# Patient Record
Sex: MALE | HISPANIC OR LATINO | Employment: OTHER | URBAN - METROPOLITAN AREA
[De-identification: names, ages, dates, MRNs, and addresses within clinical notes are randomized per-mention and may not be internally consistent; named-entity substitution may affect disease eponyms.]

---

## 2019-05-13 ENCOUNTER — HOSPITAL ENCOUNTER (INPATIENT)
Facility: HOSPITAL | Age: 79
LOS: 2 days | Discharge: HOME | End: 2019-05-15
Attending: EMERGENCY MEDICINE | Admitting: INTERNAL MEDICINE

## 2019-05-13 ENCOUNTER — APPOINTMENT (OUTPATIENT)
Dept: CT IMAGING | Facility: HOSPITAL | Age: 79
End: 2019-05-13

## 2019-05-13 DIAGNOSIS — N12 PYELONEPHRITIS: Primary | ICD-10-CM

## 2019-05-13 DIAGNOSIS — Z86.39 HISTORY OF DIABETES MELLITUS: ICD-10-CM

## 2019-05-13 DIAGNOSIS — I15.9 SECONDARY HYPERTENSION: ICD-10-CM

## 2019-05-13 DIAGNOSIS — R11.2 NAUSEA AND VOMITING, INTRACTABILITY OF VOMITING NOT SPECIFIED, UNSPECIFIED VOMITING TYPE: ICD-10-CM

## 2019-05-13 DIAGNOSIS — E87.6 HYPOKALEMIA: ICD-10-CM

## 2019-05-13 PROBLEM — E11.9 DM2 (DIABETES MELLITUS, TYPE 2) (HCC): Status: ACTIVE | Noted: 2019-05-13

## 2019-05-13 PROBLEM — I10 HTN (HYPERTENSION): Status: ACTIVE | Noted: 2019-05-13

## 2019-05-13 LAB
ALBUMIN SERPL-MCNC: 3.9 G/DL (ref 3.5–5.2)
ALBUMIN/GLOB SERPL: 1.2 G/DL
ALP SERPL-CCNC: 173 U/L (ref 39–117)
ALT SERPL W P-5'-P-CCNC: 29 U/L (ref 1–41)
ANION GAP SERPL CALCULATED.3IONS-SCNC: 12.6 MMOL/L
AST SERPL-CCNC: 46 U/L (ref 1–40)
BACTERIA UR QL AUTO: ABNORMAL /HPF
BASOPHILS # BLD AUTO: 0.06 10*3/MM3 (ref 0–0.2)
BASOPHILS NFR BLD AUTO: 0.5 % (ref 0–1.5)
BILIRUB SERPL-MCNC: 0.8 MG/DL (ref 0.2–1.2)
BILIRUB UR QL STRIP: NEGATIVE
BUN BLD-MCNC: 13 MG/DL (ref 8–23)
BUN/CREAT SERPL: 12.5 (ref 7–25)
CALCIUM SPEC-SCNC: 10 MG/DL (ref 8.6–10.5)
CHLORIDE SERPL-SCNC: 92 MMOL/L (ref 98–107)
CLARITY UR: ABNORMAL
CO2 SERPL-SCNC: 29.4 MMOL/L (ref 22–29)
COLOR UR: YELLOW
CREAT BLD-MCNC: 1.04 MG/DL (ref 0.76–1.27)
DEPRECATED RDW RBC AUTO: 41.4 FL (ref 37–54)
EOSINOPHIL # BLD AUTO: 0.08 10*3/MM3 (ref 0–0.4)
EOSINOPHIL NFR BLD AUTO: 0.6 % (ref 0.3–6.2)
ERYTHROCYTE [DISTWIDTH] IN BLOOD BY AUTOMATED COUNT: 12.7 % (ref 12.3–15.4)
GFR SERPL CREATININE-BSD FRML MDRD: 69 ML/MIN/1.73
GFR SERPL CREATININE-BSD FRML MDRD: 84 ML/MIN/1.73
GLOBULIN UR ELPH-MCNC: 3.3 GM/DL
GLUCOSE BLD-MCNC: 199 MG/DL (ref 65–99)
GLUCOSE BLDC GLUCOMTR-MCNC: 180 MG/DL (ref 70–130)
GLUCOSE UR STRIP-MCNC: NEGATIVE MG/DL
HCT VFR BLD AUTO: 36.9 % (ref 37.5–51)
HGB BLD-MCNC: 12.1 G/DL (ref 13–17.7)
HGB UR QL STRIP.AUTO: ABNORMAL
HOLD SPECIMEN: NORMAL
HOLD SPECIMEN: NORMAL
HYALINE CASTS UR QL AUTO: ABNORMAL /LPF
IMM GRANULOCYTES # BLD AUTO: 0.06 10*3/MM3 (ref 0–0.05)
IMM GRANULOCYTES NFR BLD AUTO: 0.5 % (ref 0–0.5)
KETONES UR QL STRIP: ABNORMAL
LEUKOCYTE ESTERASE UR QL STRIP.AUTO: ABNORMAL
LIPASE SERPL-CCNC: 21 U/L (ref 13–60)
LYMPHOCYTES # BLD AUTO: 1.53 10*3/MM3 (ref 0.7–3.1)
LYMPHOCYTES NFR BLD AUTO: 11.5 % (ref 19.6–45.3)
MAGNESIUM SERPL-MCNC: 2.1 MG/DL (ref 1.6–2.4)
MCH RBC QN AUTO: 29.2 PG (ref 26.6–33)
MCHC RBC AUTO-ENTMCNC: 32.8 G/DL (ref 31.5–35.7)
MCV RBC AUTO: 89.1 FL (ref 79–97)
MONOCYTES # BLD AUTO: 1.15 10*3/MM3 (ref 0.1–0.9)
MONOCYTES NFR BLD AUTO: 8.6 % (ref 5–12)
NEUTROPHILS # BLD AUTO: 10.45 10*3/MM3 (ref 1.7–7)
NEUTROPHILS NFR BLD AUTO: 78.3 % (ref 42.7–76)
NITRITE UR QL STRIP: POSITIVE
NRBC BLD AUTO-RTO: 0 /100 WBC (ref 0–0.2)
PH UR STRIP.AUTO: 5.5 [PH] (ref 5–8)
PLATELET # BLD AUTO: 371 10*3/MM3 (ref 140–450)
PMV BLD AUTO: 9.3 FL (ref 6–12)
POTASSIUM BLD-SCNC: 3 MMOL/L (ref 3.5–5.2)
PROT SERPL-MCNC: 7.2 G/DL (ref 6–8.5)
PROT UR QL STRIP: ABNORMAL
RBC # BLD AUTO: 4.14 10*6/MM3 (ref 4.14–5.8)
RBC # UR: ABNORMAL /HPF
REF LAB TEST METHOD: ABNORMAL
SODIUM BLD-SCNC: 134 MMOL/L (ref 136–145)
SP GR UR STRIP: 1.01 (ref 1–1.03)
SQUAMOUS #/AREA URNS HPF: ABNORMAL /HPF
UROBILINOGEN UR QL STRIP: ABNORMAL
WBC NRBC COR # BLD: 13.33 10*3/MM3 (ref 3.4–10.8)
WBC UR QL AUTO: ABNORMAL /HPF
WHOLE BLOOD HOLD SPECIMEN: NORMAL
WHOLE BLOOD HOLD SPECIMEN: NORMAL

## 2019-05-13 PROCEDURE — 80053 COMPREHEN METABOLIC PANEL: CPT

## 2019-05-13 PROCEDURE — 25010000002 IOPAMIDOL 61 % SOLUTION: Performed by: EMERGENCY MEDICINE

## 2019-05-13 PROCEDURE — 83735 ASSAY OF MAGNESIUM: CPT | Performed by: EMERGENCY MEDICINE

## 2019-05-13 PROCEDURE — 74177 CT ABD & PELVIS W/CONTRAST: CPT

## 2019-05-13 PROCEDURE — 25010000002 LEVOFLOXACIN PER 250 MG: Performed by: EMERGENCY MEDICINE

## 2019-05-13 PROCEDURE — 87077 CULTURE AEROBIC IDENTIFY: CPT | Performed by: EMERGENCY MEDICINE

## 2019-05-13 PROCEDURE — 25010000002 ERTAPENEM PER 500 MG: Performed by: HOSPITALIST

## 2019-05-13 PROCEDURE — 87186 SC STD MICRODIL/AGAR DIL: CPT | Performed by: EMERGENCY MEDICINE

## 2019-05-13 PROCEDURE — 87086 URINE CULTURE/COLONY COUNT: CPT | Performed by: EMERGENCY MEDICINE

## 2019-05-13 PROCEDURE — 81001 URINALYSIS AUTO W/SCOPE: CPT | Performed by: EMERGENCY MEDICINE

## 2019-05-13 PROCEDURE — 85025 COMPLETE CBC W/AUTO DIFF WBC: CPT

## 2019-05-13 PROCEDURE — 83690 ASSAY OF LIPASE: CPT

## 2019-05-13 PROCEDURE — 82962 GLUCOSE BLOOD TEST: CPT

## 2019-05-13 PROCEDURE — 63710000001 INSULIN LISPRO (HUMAN) PER 5 UNITS: Performed by: HOSPITALIST

## 2019-05-13 PROCEDURE — 99284 EMERGENCY DEPT VISIT MOD MDM: CPT

## 2019-05-13 PROCEDURE — 25010000002 ONDANSETRON PER 1 MG: Performed by: EMERGENCY MEDICINE

## 2019-05-13 RX ORDER — SODIUM CHLORIDE 0.9 % (FLUSH) 0.9 %
3 SYRINGE (ML) INJECTION EVERY 12 HOURS SCHEDULED
Status: DISCONTINUED | OUTPATIENT
Start: 2019-05-13 | End: 2019-05-15 | Stop reason: HOSPADM

## 2019-05-13 RX ORDER — POTASSIUM CHLORIDE 1.5 G/1.77G
40 POWDER, FOR SOLUTION ORAL ONCE
Status: COMPLETED | OUTPATIENT
Start: 2019-05-13 | End: 2019-05-13

## 2019-05-13 RX ORDER — NICOTINE POLACRILEX 4 MG
15 LOZENGE BUCCAL
Status: DISCONTINUED | OUTPATIENT
Start: 2019-05-13 | End: 2019-05-15 | Stop reason: HOSPADM

## 2019-05-13 RX ORDER — GLIPIZIDE 10 MG/1
10 TABLET ORAL
COMMUNITY
End: 2020-01-24 | Stop reason: HOSPADM

## 2019-05-13 RX ORDER — SODIUM CHLORIDE 0.9 % (FLUSH) 0.9 %
3-10 SYRINGE (ML) INJECTION AS NEEDED
Status: DISCONTINUED | OUTPATIENT
Start: 2019-05-13 | End: 2019-05-15 | Stop reason: HOSPADM

## 2019-05-13 RX ORDER — ASPIRIN 81 MG/1
81 TABLET ORAL DAILY
Status: DISCONTINUED | OUTPATIENT
Start: 2019-05-14 | End: 2019-05-15 | Stop reason: HOSPADM

## 2019-05-13 RX ORDER — ACETAMINOPHEN 325 MG/1
650 TABLET ORAL EVERY 4 HOURS PRN
Status: DISCONTINUED | OUTPATIENT
Start: 2019-05-13 | End: 2019-05-15 | Stop reason: HOSPADM

## 2019-05-13 RX ORDER — ASPIRIN 81 MG/1
81 TABLET ORAL DAILY
COMMUNITY
End: 2020-01-16 | Stop reason: HOSPADM

## 2019-05-13 RX ORDER — LISINOPRIL 2.5 MG/1
2.5 TABLET ORAL DAILY
Status: ON HOLD | COMMUNITY
End: 2019-05-15 | Stop reason: SDUPTHER

## 2019-05-13 RX ORDER — HYDROCODONE BITARTRATE AND ACETAMINOPHEN 5; 325 MG/1; MG/1
1 TABLET ORAL EVERY 6 HOURS PRN
Status: DISCONTINUED | OUTPATIENT
Start: 2019-05-13 | End: 2019-05-15 | Stop reason: HOSPADM

## 2019-05-13 RX ORDER — AMOXICILLIN AND CLAVULANATE POTASSIUM 875; 125 MG/1; MG/1
1 TABLET, FILM COATED ORAL 2 TIMES DAILY
COMMUNITY
End: 2019-05-15 | Stop reason: HOSPADM

## 2019-05-13 RX ORDER — LEVOFLOXACIN 5 MG/ML
750 INJECTION, SOLUTION INTRAVENOUS ONCE
Status: COMPLETED | OUTPATIENT
Start: 2019-05-13 | End: 2019-05-13

## 2019-05-13 RX ORDER — ONDANSETRON 2 MG/ML
4 INJECTION INTRAMUSCULAR; INTRAVENOUS ONCE
Status: COMPLETED | OUTPATIENT
Start: 2019-05-13 | End: 2019-05-13

## 2019-05-13 RX ORDER — SODIUM CHLORIDE 0.9 % (FLUSH) 0.9 %
10 SYRINGE (ML) INJECTION AS NEEDED
Status: DISCONTINUED | OUTPATIENT
Start: 2019-05-13 | End: 2019-05-15 | Stop reason: HOSPADM

## 2019-05-13 RX ORDER — LISINOPRIL 5 MG/1
5 TABLET ORAL DAILY
Status: DISCONTINUED | OUTPATIENT
Start: 2019-05-14 | End: 2019-05-15

## 2019-05-13 RX ORDER — DEXTROSE MONOHYDRATE 25 G/50ML
25 INJECTION, SOLUTION INTRAVENOUS
Status: DISCONTINUED | OUTPATIENT
Start: 2019-05-13 | End: 2019-05-15 | Stop reason: HOSPADM

## 2019-05-13 RX ORDER — FUROSEMIDE 40 MG/1
40 TABLET ORAL DAILY
Status: ON HOLD | COMMUNITY
End: 2019-05-15 | Stop reason: SDUPTHER

## 2019-05-13 RX ADMIN — INSULIN LISPRO 2 UNITS: 100 INJECTION, SOLUTION INTRAVENOUS; SUBCUTANEOUS at 22:59

## 2019-05-13 RX ADMIN — ERTAPENEM SODIUM 1 G: 1 INJECTION, POWDER, LYOPHILIZED, FOR SOLUTION INTRAMUSCULAR; INTRAVENOUS at 22:59

## 2019-05-13 RX ADMIN — ONDANSETRON HYDROCHLORIDE 4 MG: 2 SOLUTION INTRAMUSCULAR; INTRAVENOUS at 17:52

## 2019-05-13 RX ADMIN — LEVOFLOXACIN 750 MG: 5 INJECTION, SOLUTION INTRAVENOUS at 19:00

## 2019-05-13 RX ADMIN — POTASSIUM CHLORIDE 40 MEQ: 1.5 POWDER, FOR SOLUTION ORAL at 23:01

## 2019-05-13 RX ADMIN — POTASSIUM CHLORIDE 40 MEQ: 1.5 POWDER, FOR SOLUTION ORAL at 18:58

## 2019-05-13 RX ADMIN — IOPAMIDOL 85 ML: 612 INJECTION, SOLUTION INTRAVENOUS at 18:42

## 2019-05-13 RX ADMIN — SODIUM CHLORIDE 1000 ML: 9 INJECTION, SOLUTION INTRAVENOUS at 17:46

## 2019-05-13 NOTE — ED NOTES
Per family- pt has had dec water and food intake, upset stomach, n/v, and dizziness that began 1 week ago. Dx with UTI almost 2 weeks ago and can't keep down antibiotics.      Estefani Mojica, RN  05/13/19 7062

## 2019-05-13 NOTE — PROGRESS NOTES
Clinical Pharmacy Services: Medication History    Raymon Solis is a 78 y.o. male presenting to [facility] for   Chief Complaint   Patient presents with   • Vomiting   • Decreased Intake       He  has a past medical history of Diabetes mellitus (CMS/[facility]) and Hypertension.    Allergies as of 05/13/2019   • (No Known Allergies)       Medication information was obtained from: Medication bottles  Pharmacy and Phone Number: [facility] [phone]    Prior to Admission Medications     Prescriptions Last Dose Informant Patient Reported? Taking?    amoxicillin-clavulanate (AUGMENTIN) 875-125 MG per tablet   Yes Yes    Take 1 tablet by mouth 2 (Two) Times a Day.    aspirin 81 MG EC tablet   Yes Yes    Take 81 mg by mouth Daily.    furosemide (LASIX) 40 MG tablet   Yes Yes    Take 40 mg by mouth Daily.    glipiZIDE (GLUCOTROL) 10 MG tablet   Yes Yes    Take 10 mg by mouth 2 (Two) Times a Day Before Meals.    lisinopril (PRINIVIL,ZESTRIL) 2.5 MG tablet   Yes Yes    Take 2.5 mg by mouth Daily.    metFORMIN (GLUCOPHAGE) 1000 MG tablet   Yes Yes    Take 1,000 mg by mouth 2 (Two) Times a Day With Meals.            Medication notes: Family reports that the patient has not been taking home meds due to vomiting and difficulty swallowing the tablets.    This medication list is complete to the best of my knowledge as of 5/13/2019    Please call if questions.    Stacy Park, Medication History Technician  5/13/2019 7:30 PM

## 2019-05-13 NOTE — ED PROVIDER NOTES
EMERGENCY DEPARTMENT ENCOUNTER    CHIEF COMPLAINT  Chief Complaint: N/V  History given by: Pt  History limited by: none  Room Number: 23/23  PMD: Magali Burgess MD      HPI:  Pt is a 78 y.o. male who presents complaining of N/V for the past week. Pt also c/o generalized weakness and decreased PO intake. Pt states he is unable to keep his medications down for the past few days. Pt denies diarrhea or fever. Per family, pt wast started on Abx two weeks ago after being diagnosed with UTI. Pt reports dysuria (burning with urination), hematuria, and bilateral lower back pain. Hx of DM.     Duration:  One week  Onset: gradual  Timing: episodic  Location: GI  Radiation: none  Quality: N/V  Intensity/Severity: moderate  Progression: unchanged  Associated Symptoms: generalized weakness, decreased PO intake, dysuria, hematuria, lower back pain  Aggravating Factors: none  Alleviating Factors: none  Previous Episodes: none stated  Treatment before arrival: pt wast started on Abx two weeks ago after being diagnosed with UTI.    PAST MEDICAL HISTORY  Active Ambulatory Problems     Diagnosis Date Noted   • No Active Ambulatory Problems     Resolved Ambulatory Problems     Diagnosis Date Noted   • No Resolved Ambulatory Problems     Past Medical History:   Diagnosis Date   • Diabetes mellitus (CMS/HCC)    • Hypertension        PAST SURGICAL HISTORY  Past Surgical History:   Procedure Laterality Date   • NO PAST SURGERIES         FAMILY HISTORY  History reviewed. No pertinent family history.    SOCIAL HISTORY  Social History     Socioeconomic History   • Marital status:      Spouse name: Not on file   • Number of children: Not on file   • Years of education: Not on file   • Highest education level: Not on file   Tobacco Use   • Smoking status: Never Smoker   Substance and Sexual Activity   • Alcohol use: No     Frequency: Never       ALLERGIES  Patient has no known allergies.    REVIEW OF SYSTEMS  Review of Systems    Constitutional: Positive for activity change (decreased PO intake) and appetite change (decreased). Negative for fever.   HENT: Negative for congestion and sore throat.    Eyes: Negative.    Respiratory: Negative for cough and shortness of breath.    Cardiovascular: Negative for chest pain and leg swelling.   Gastrointestinal: Positive for nausea and vomiting (for one week). Negative for abdominal pain and diarrhea.   Endocrine: Negative.    Genitourinary: Positive for dysuria (burning with urination) and hematuria. Negative for decreased urine volume.   Musculoskeletal: Positive for back pain (lower). Negative for neck pain.   Skin: Negative for rash and wound.   Allergic/Immunologic: Negative.    Neurological: Positive for weakness (generalzed). Negative for numbness and headaches.   Hematological: Negative.    Psychiatric/Behavioral: Negative.    All other systems reviewed and are negative.      PHYSICAL EXAM  ED Triage Vitals   Temp Heart Rate Resp BP SpO2   05/13/19 1544 05/13/19 1544 05/13/19 1544 05/13/19 1656 05/13/19 1544   99.8 °F (37.7 °C) 109 16 (!) 200/96 98 %      Temp src Heart Rate Source Patient Position BP Location FiO2 (%)   05/13/19 1544 05/13/19 1544 05/13/19 1656 05/13/19 1656 --   Tympanic Monitor Sitting Right arm        Physical Exam   Constitutional: He is oriented to person, place, and time and well-developed, well-nourished, and in no distress. No distress.   HENT:   Mouth/Throat: Mucous membranes are dry.   Eyes: EOM are normal.   Neck: Normal range of motion.   Cardiovascular: Normal rate and regular rhythm. Exam reveals no gallop and no friction rub.   No murmur heard.  Pulmonary/Chest: Effort normal. No respiratory distress. He has no wheezes. He has no rhonchi. He has no rales.   Breath sounds are symmetric.   Abdominal: Soft. He exhibits no distension. There is tenderness (mild suprapubic). There is no guarding and no CVA tenderness.   Musculoskeletal: Normal range of motion. He  exhibits no deformity.   Neurological: He is alert and oriented to person, place, and time.   moving all extremities, no focal deficits   Skin: Skin is warm and dry.   Psychiatric:   Calm, cooperative   Nursing note and vitals reviewed.      LAB RESULTS  Lab Results (last 24 hours)     Procedure Component Value Units Date/Time    CBC & Differential [[id number]] Collected:  05/13/19 1657    Specimen:  Blood Updated:  05/13/19 1709    Narrative:       The following orders were created for panel order CBC & Differential.  Procedure                               Abnormality         Status                     ---------                               -----------         ------                     CBC Auto Differential[[id number]]        Abnormal            Final result                 Please view results for these tests on the individual orders.    Comprehensive Metabolic Panel [[id number]]  (Abnormal) Collected:  05/13/19 1657    Specimen:  Blood Updated:  05/13/19 1730     Glucose 199 mg/dL      BUN 13 mg/dL      Creatinine 1.04 mg/dL      Sodium 134 mmol/L      Potassium 3.0 mmol/L      Chloride 92 mmol/L      CO2 29.4 mmol/L      Calcium 10.0 mg/dL      Total Protein 7.2 g/dL      Albumin 3.90 g/dL      ALT (SGPT) 29 U/L      AST (SGOT) 46 U/L      Alkaline Phosphatase 173 U/L      Total Bilirubin 0.8 mg/dL      eGFR Non African Amer 69 mL/min/1.73      eGFR  African Amer 84 mL/min/1.73      Globulin 3.3 gm/dL      A/G Ratio 1.2 g/dL      BUN/Creatinine Ratio 12.5     Anion Gap 12.6 mmol/L     Narrative:       GFR Normal >60  Chronic Kidney Disease <60  Kidney Failure <15    Lipase [[id number]]  (Normal) Collected:  05/13/19 1657    Specimen:  Blood Updated:  05/13/19 1730     Lipase 21 U/L     CBC Auto Differential [[id number]]  (Abnormal) Collected:  05/13/19 1657    Specimen:  Blood Updated:  05/13/19 1709     WBC 13.33 10*3/mm3      RBC 4.14 10*6/mm3      Hemoglobin 12.1 g/dL      Hematocrit 36.9 %      MCV 89.1 fL       MCH 29.2 pg      MCHC 32.8 g/dL      RDW 12.7 %      RDW-SD 41.4 fl      MPV 9.3 fL      Platelets 371 10*3/mm3      Neutrophil % 78.3 %      Lymphocyte % 11.5 %      Monocyte % 8.6 %      Eosinophil % 0.6 %      Basophil % 0.5 %      Immature Grans % 0.5 %      Neutrophils, Absolute 10.45 10*3/mm3      Lymphocytes, Absolute 1.53 10*3/mm3      Monocytes, Absolute 1.15 10*3/mm3      Eosinophils, Absolute 0.08 10*3/mm3      Basophils, Absolute 0.06 10*3/mm3      Immature Grans, Absolute 0.06 10*3/mm3      nRBC 0.0 /100 WBC     Magnesium [[id number]]  (Normal) Collected:  05/13/19 1657    Specimen:  Blood Updated:  05/13/19 1848     Magnesium 2.1 mg/dL     Urinalysis With Microscopic If Indicated (No Culture) - Urine, Clean Catch [[id number]]  (Abnormal) Collected:  05/13/19 1824    Specimen:  Urine, Clean Catch Updated:  05/13/19 1842     Color, UA Yellow     Appearance, UA Turbid     pH, UA 5.5     Specific Gravity, UA 1.013     Glucose, UA Negative     Ketones, UA 15 mg/dL (1+)     Bilirubin, UA Negative     Blood, UA Moderate (2+)     Protein,  mg/dL (2+)     Leuk Esterase, UA Large (3+)     Nitrite, UA Positive     Urobilinogen, UA 1.0 E.U./dL    Urinalysis, Microscopic Only - Urine, Clean Catch [[id number]]  (Abnormal) Collected:  05/13/19 1824    Specimen:  Urine, Clean Catch Updated:  05/13/19 1842     RBC, UA 6-12 /HPF      WBC, UA Too Numerous to Count /HPF      Bacteria, UA 4+ /HPF      Squamous Epithelial Cells, UA 0-2 /HPF      Hyaline Casts, UA 3-6 /LPF      Methodology Automated Microscopy          I ordered the above labs and reviewed the results    RADIOLOGY  CT Abdomen Pelvis With Contrast              I ordered the above noted radiological studies. Interpreted by radiologist. Discussed with radiologist (Dr. Rodrigues). Reviewed by me in PACS.       PROCEDURES  Procedures      PROGRESS AND CONSULTS        1652  Ordered lab work for further evaluation.     1743  Ordered CT abd/pelvis for further  evaluation. Ordered IV Zofran for nausea and IVF's for hydration.    1905  Per Dr. Rodrigues, CT abd/pelvis shows pyelonephritis and mild colitis.    1908  Rechecked pt. Pt is resting comfortably. Notified pt of pyelonephritis. Discussed the plan to hospitalize the patient. Pt understands and agrees with the plan, all questions answered.    1917  Discussed the pt's case with Dr. Lee, [facility] who agrees to admit the pt.     1930   Family updated on findings and need for hospital stay.       MEDICAL DECISION MAKING  Results were reviewed/discussed with the patient and they were also made aware of online access. Pt also made aware that some labs, such as cultures, will not be resulted during ER visit and follow up with PMD is necessary.     MDM  Number of Diagnoses or Management Options  History of diabetes mellitus:   Hypokalemia:   Nausea and vomiting, intractability of vomiting not specified, unspecified vomiting type:   Pyelonephritis:   Secondary hypertension:   Diagnosis management comments: Initial concern for possible pyelonephritis based on failure of outpatient therapy for treatment of a previously diagnosed UTI, with developing nausea and vomiting over the last week with difficulty continuing to take his medications.  Patient was on Augmentin for the UTI.  Laboratory work-up shows a mild hypokalemia of 3.0, this was repleted with 40 mg once p.o. potassium.  Patient with no evidence of acute kidney injury, though he does show a leukocytosis of 13,000 and a residual UTI with 4+ bacteria, large leukocytes, and nitrites on his urinalysis.  CT scan does show evidence of pyelonephritis as well as possible colitis, the patient has not had significant diarrhea recently.  Gallstone noted on CT scan but no evidence of acute cholecystitis.  IV antibiotics have been initiated and he will be admitted to the [facility] service.  Patient and family have been updated on course and plan the need for hospital stay.       Amount  and/or Complexity of Data Reviewed  Clinical lab tests: ordered and reviewed (CBC: WBC=13; UA: 6-12 RBC, TNTC WBCs, 4+ bacteria; CMP: BUN = 13, creatinine=1.04)  Tests in the radiology section of CPT®: ordered and reviewed (CT abd/pelvis shows pyelonephritis and mild colitis.)  Discussion of test results with the performing providers: yes (Dr. Rodrigues, Radiology)  Discuss the patient with other providers: yes (Dr. Lee, [facility])           DIAGNOSIS  Final diagnoses:   Pyelonephritis   History of diabetes mellitus   Secondary hypertension   Hypokalemia   Nausea and vomiting, intractability of vomiting not specified, unspecified vomiting type       DISPOSITION  ADMISSION    Discussed treatment plan and reason for admission with pt/family and admitting physician.  Pt/family voiced understanding of the plan for admission for further testing/treatment as needed.         Latest Documented Vital Signs:  As of 7:35 PM  BP- (!) 203/89 HR- 88 Temp- 99.8 °F (37.7 °C) (Tympanic) O2 sat- 97%    --  Documentation assistance provided by sita Vee for Dr. Gunn.  Information recorded by the sita was done at my direction and has been verified and validated by me.                   Danial Vee  05/13/19 1920       Juan Pina MD  05/13/19 1935

## 2019-05-14 ENCOUNTER — APPOINTMENT (OUTPATIENT)
Dept: CARDIOLOGY | Facility: HOSPITAL | Age: 79
End: 2019-05-14

## 2019-05-14 PROBLEM — R74.8 ELEVATED ALKALINE PHOSPHATASE LEVEL: Status: ACTIVE | Noted: 2019-05-14

## 2019-05-14 LAB
ANION GAP SERPL CALCULATED.3IONS-SCNC: 10.3 MMOL/L
AORTIC DIMENSIONLESS INDEX: 0.4 (DI)
BASOPHILS # BLD AUTO: 0.03 10*3/MM3 (ref 0–0.2)
BASOPHILS NFR BLD AUTO: 0.3 % (ref 0–1.5)
BH CV ECHO MEAS - ACS: 0.8 CM
BH CV ECHO MEAS - AO MAX PG (FULL): 45.1 MMHG
BH CV ECHO MEAS - AO MAX PG: 51 MMHG
BH CV ECHO MEAS - AO MEAN PG (FULL): 25 MMHG
BH CV ECHO MEAS - AO MEAN PG: 28 MMHG
BH CV ECHO MEAS - AO ROOT AREA (BSA CORRECTED): 1.8
BH CV ECHO MEAS - AO ROOT AREA: 9.1 CM^2
BH CV ECHO MEAS - AO ROOT DIAM: 3.4 CM
BH CV ECHO MEAS - AO V2 MAX: 357 CM/SEC
BH CV ECHO MEAS - AO V2 MEAN: 247 CM/SEC
BH CV ECHO MEAS - AO V2 VTI: 73.4 CM
BH CV ECHO MEAS - ASC AORTA: 3.4 CM
BH CV ECHO MEAS - AVA(I,A): 1 CM^2
BH CV ECHO MEAS - AVA(I,D): 1 CM^2
BH CV ECHO MEAS - AVA(V,A): 0.96 CM^2
BH CV ECHO MEAS - AVA(V,D): 0.96 CM^2
BH CV ECHO MEAS - BSA(HAYCOCK): 2 M^2
BH CV ECHO MEAS - BSA: 1.9 M^2
BH CV ECHO MEAS - BZI_BMI: 31.1 KILOGRAMS/M^2
BH CV ECHO MEAS - BZI_METRIC_HEIGHT: 162.6 CM
BH CV ECHO MEAS - BZI_METRIC_WEIGHT: 82.1 KG
BH CV ECHO MEAS - EDV(CUBED): 110.6 ML
BH CV ECHO MEAS - EDV(MOD-SP2): 105 ML
BH CV ECHO MEAS - EDV(MOD-SP4): 106 ML
BH CV ECHO MEAS - EDV(TEICH): 107.5 ML
BH CV ECHO MEAS - EF(CUBED): 75.6 %
BH CV ECHO MEAS - EF(MOD-BP): 73 %
BH CV ECHO MEAS - EF(MOD-SP2): 74.3 %
BH CV ECHO MEAS - EF(MOD-SP4): 72.6 %
BH CV ECHO MEAS - EF(TEICH): 67.4 %
BH CV ECHO MEAS - ESV(CUBED): 27 ML
BH CV ECHO MEAS - ESV(MOD-SP2): 27 ML
BH CV ECHO MEAS - ESV(MOD-SP4): 29 ML
BH CV ECHO MEAS - ESV(TEICH): 35 ML
BH CV ECHO MEAS - FS: 37.5 %
BH CV ECHO MEAS - IVS/LVPW: 1.1
BH CV ECHO MEAS - IVSD: 1.1 CM
BH CV ECHO MEAS - LAT PEAK E' VEL: 8 CM/SEC
BH CV ECHO MEAS - LV DIASTOLIC VOL/BSA (35-75): 56.5 ML/M^2
BH CV ECHO MEAS - LV MASS(C)D: 181.9 GRAMS
BH CV ECHO MEAS - LV MASS(C)DI: 97 GRAMS/M^2
BH CV ECHO MEAS - LV MAX PG: 5.9 MMHG
BH CV ECHO MEAS - LV MEAN PG: 3 MMHG
BH CV ECHO MEAS - LV SYSTOLIC VOL/BSA (12-30): 15.5 ML/M^2
BH CV ECHO MEAS - LV V1 MAX: 121 CM/SEC
BH CV ECHO MEAS - LV V1 MEAN: 78.1 CM/SEC
BH CV ECHO MEAS - LV V1 VTI: 25.8 CM
BH CV ECHO MEAS - LVIDD: 4.8 CM
BH CV ECHO MEAS - LVIDS: 3 CM
BH CV ECHO MEAS - LVLD AP2: 8.9 CM
BH CV ECHO MEAS - LVLD AP4: 8.8 CM
BH CV ECHO MEAS - LVLS AP2: 7.6 CM
BH CV ECHO MEAS - LVLS AP4: 7 CM
BH CV ECHO MEAS - LVOT AREA (M): 2.8 CM^2
BH CV ECHO MEAS - LVOT AREA: 2.8 CM^2
BH CV ECHO MEAS - LVOT DIAM: 1.9 CM
BH CV ECHO MEAS - LVPWD: 1 CM
BH CV ECHO MEAS - MED PEAK E' VEL: 5 CM/SEC
BH CV ECHO MEAS - MV A DUR: 0.21 SEC
BH CV ECHO MEAS - MV A MAX VEL: 116 CM/SEC
BH CV ECHO MEAS - MV DEC SLOPE: 363 CM/SEC^2
BH CV ECHO MEAS - MV DEC TIME: 0.22 SEC
BH CV ECHO MEAS - MV E MAX VEL: 84.4 CM/SEC
BH CV ECHO MEAS - MV E/A: 0.73
BH CV ECHO MEAS - MV MAX PG: 5.6 MMHG
BH CV ECHO MEAS - MV MEAN PG: 2 MMHG
BH CV ECHO MEAS - MV P1/2T MAX VEL: 89.6 CM/SEC
BH CV ECHO MEAS - MV P1/2T: 72.3 MSEC
BH CV ECHO MEAS - MV V2 MAX: 118 CM/SEC
BH CV ECHO MEAS - MV V2 MEAN: 70.8 CM/SEC
BH CV ECHO MEAS - MV V2 VTI: 27.8 CM
BH CV ECHO MEAS - MVA P1/2T LCG: 2.5 CM^2
BH CV ECHO MEAS - MVA(P1/2T): 3 CM^2
BH CV ECHO MEAS - MVA(VTI): 2.6 CM^2
BH CV ECHO MEAS - PA ACC TIME: 0.12 SEC
BH CV ECHO MEAS - PA MAX PG (FULL): 3.3 MMHG
BH CV ECHO MEAS - PA MAX PG: 5.9 MMHG
BH CV ECHO MEAS - PA PR(ACCEL): 24.6 MMHG
BH CV ECHO MEAS - PA V2 MAX: 121 CM/SEC
BH CV ECHO MEAS - PI END-D VEL: 131 CM/SEC
BH CV ECHO MEAS - PULM A REVS DUR: 0.22 SEC
BH CV ECHO MEAS - PULM A REVS VEL: 33.2 CM/SEC
BH CV ECHO MEAS - PULM DIAS VEL: 39.8 CM/SEC
BH CV ECHO MEAS - PULM S/D: 1.2
BH CV ECHO MEAS - PULM SYS VEL: 49.7 CM/SEC
BH CV ECHO MEAS - PVA(V,A): 2.3 CM^2
BH CV ECHO MEAS - PVA(V,D): 2.3 CM^2
BH CV ECHO MEAS - QP/QS: 0.79
BH CV ECHO MEAS - RAP SYSTOLE: 3 MMHG
BH CV ECHO MEAS - RV MAX PG: 2.6 MMHG
BH CV ECHO MEAS - RV MEAN PG: 1 MMHG
BH CV ECHO MEAS - RV V1 MAX: 80.5 CM/SEC
BH CV ECHO MEAS - RV V1 MEAN: 54.2 CM/SEC
BH CV ECHO MEAS - RV V1 VTI: 16.6 CM
BH CV ECHO MEAS - RVOT AREA: 3.5 CM^2
BH CV ECHO MEAS - RVOT DIAM: 2.1 CM
BH CV ECHO MEAS - RVSP: 18.5 MMHG
BH CV ECHO MEAS - SI(AO): 355.5 ML/M^2
BH CV ECHO MEAS - SI(CUBED): 44.6 ML/M^2
BH CV ECHO MEAS - SI(LVOT): 39 ML/M^2
BH CV ECHO MEAS - SI(MOD-SP2): 41.6 ML/M^2
BH CV ECHO MEAS - SI(MOD-SP4): 41.1 ML/M^2
BH CV ECHO MEAS - SI(TEICH): 38.7 ML/M^2
BH CV ECHO MEAS - SV(AO): 666.4 ML
BH CV ECHO MEAS - SV(CUBED): 83.6 ML
BH CV ECHO MEAS - SV(LVOT): 73.2 ML
BH CV ECHO MEAS - SV(MOD-SP2): 78 ML
BH CV ECHO MEAS - SV(MOD-SP4): 77 ML
BH CV ECHO MEAS - SV(RVOT): 57.5 ML
BH CV ECHO MEAS - SV(TEICH): 72.5 ML
BH CV ECHO MEAS - TAPSE (>1.6): 2.5 CM2
BH CV ECHO MEAS - TR MAX VEL: 197 CM/SEC
BH CV ECHO MEASUREMENTS AVERAGE E/E' RATIO: 12.98
BH CV VAS BP RIGHT ARM: NORMAL MMHG
BH CV XLRA - RV BASE: 3.6 CM
BH CV XLRA - TDI S': 16 CM/SEC
BUN BLD-MCNC: 7 MG/DL (ref 8–23)
BUN/CREAT SERPL: 8.2 (ref 7–25)
CALCIUM SPEC-SCNC: 9.3 MG/DL (ref 8.6–10.5)
CHLORIDE SERPL-SCNC: 96 MMOL/L (ref 98–107)
CO2 SERPL-SCNC: 30.7 MMOL/L (ref 22–29)
CREAT BLD-MCNC: 0.85 MG/DL (ref 0.76–1.27)
DEPRECATED RDW RBC AUTO: 41.7 FL (ref 37–54)
EOSINOPHIL # BLD AUTO: 0.12 10*3/MM3 (ref 0–0.4)
EOSINOPHIL NFR BLD AUTO: 1.1 % (ref 0.3–6.2)
ERYTHROCYTE [DISTWIDTH] IN BLOOD BY AUTOMATED COUNT: 12.8 % (ref 12.3–15.4)
GFR SERPL CREATININE-BSD FRML MDRD: 106 ML/MIN/1.73
GFR SERPL CREATININE-BSD FRML MDRD: 87 ML/MIN/1.73
GLUCOSE BLD-MCNC: 154 MG/DL (ref 65–99)
GLUCOSE BLDC GLUCOMTR-MCNC: 112 MG/DL (ref 70–130)
GLUCOSE BLDC GLUCOMTR-MCNC: 118 MG/DL (ref 70–130)
GLUCOSE BLDC GLUCOMTR-MCNC: 140 MG/DL (ref 70–130)
GLUCOSE BLDC GLUCOMTR-MCNC: 143 MG/DL (ref 70–130)
GLUCOSE BLDC GLUCOMTR-MCNC: 205 MG/DL (ref 70–130)
HCT VFR BLD AUTO: 34.5 % (ref 37.5–51)
HGB BLD-MCNC: 11.2 G/DL (ref 13–17.7)
IMM GRANULOCYTES # BLD AUTO: 0.04 10*3/MM3 (ref 0–0.05)
IMM GRANULOCYTES NFR BLD AUTO: 0.4 % (ref 0–0.5)
LEFT ATRIUM VOLUME INDEX: 35 ML/M2
LEFT ATRIUM VOLUME: 63 CM3
LV EF 2D ECHO EST: 73 %
LYMPHOCYTES # BLD AUTO: 1.02 10*3/MM3 (ref 0.7–3.1)
LYMPHOCYTES NFR BLD AUTO: 9.1 % (ref 19.6–45.3)
MAXIMAL PREDICTED HEART RATE: 142 BPM
MCH RBC QN AUTO: 29 PG (ref 26.6–33)
MCHC RBC AUTO-ENTMCNC: 32.5 G/DL (ref 31.5–35.7)
MCV RBC AUTO: 89.4 FL (ref 79–97)
MONOCYTES # BLD AUTO: 1.09 10*3/MM3 (ref 0.1–0.9)
MONOCYTES NFR BLD AUTO: 9.7 % (ref 5–12)
NEUTROPHILS # BLD AUTO: 8.97 10*3/MM3 (ref 1.7–7)
NEUTROPHILS NFR BLD AUTO: 79.4 % (ref 42.7–76)
NRBC BLD AUTO-RTO: 0 /100 WBC (ref 0–0.2)
PLATELET # BLD AUTO: 330 10*3/MM3 (ref 140–450)
PMV BLD AUTO: 9.5 FL (ref 6–12)
POTASSIUM BLD-SCNC: 2.8 MMOL/L (ref 3.5–5.2)
POTASSIUM BLD-SCNC: 3.8 MMOL/L (ref 3.5–5.2)
RBC # BLD AUTO: 3.86 10*6/MM3 (ref 4.14–5.8)
SODIUM BLD-SCNC: 137 MMOL/L (ref 136–145)
STRESS TARGET HR: 121 BPM
WBC NRBC COR # BLD: 11.27 10*3/MM3 (ref 3.4–10.8)

## 2019-05-14 PROCEDURE — 93306 TTE W/DOPPLER COMPLETE: CPT | Performed by: INTERNAL MEDICINE

## 2019-05-14 PROCEDURE — 25010000002 ERTAPENEM PER 500 MG: Performed by: HOSPITALIST

## 2019-05-14 PROCEDURE — 82962 GLUCOSE BLOOD TEST: CPT

## 2019-05-14 PROCEDURE — 93306 TTE W/DOPPLER COMPLETE: CPT

## 2019-05-14 PROCEDURE — 84132 ASSAY OF SERUM POTASSIUM: CPT | Performed by: NURSE PRACTITIONER

## 2019-05-14 PROCEDURE — 63710000001 INSULIN LISPRO (HUMAN) PER 5 UNITS: Performed by: HOSPITALIST

## 2019-05-14 PROCEDURE — 80048 BASIC METABOLIC PNL TOTAL CA: CPT | Performed by: HOSPITALIST

## 2019-05-14 PROCEDURE — 85025 COMPLETE CBC W/AUTO DIFF WBC: CPT | Performed by: HOSPITALIST

## 2019-05-14 RX ORDER — HYDRALAZINE HYDROCHLORIDE 25 MG/1
25 TABLET, FILM COATED ORAL EVERY 8 HOURS PRN
Status: DISCONTINUED | OUTPATIENT
Start: 2019-05-14 | End: 2019-05-15 | Stop reason: HOSPADM

## 2019-05-14 RX ORDER — POTASSIUM CHLORIDE 7.45 MG/ML
10 INJECTION INTRAVENOUS
Status: DISCONTINUED | OUTPATIENT
Start: 2019-05-14 | End: 2019-05-15 | Stop reason: HOSPADM

## 2019-05-14 RX ORDER — POTASSIUM CHLORIDE 1.5 G/1.77G
40 POWDER, FOR SOLUTION ORAL AS NEEDED
Status: DISCONTINUED | OUTPATIENT
Start: 2019-05-14 | End: 2019-05-15 | Stop reason: HOSPADM

## 2019-05-14 RX ORDER — POTASSIUM CHLORIDE 750 MG/1
40 CAPSULE, EXTENDED RELEASE ORAL AS NEEDED
Status: DISCONTINUED | OUTPATIENT
Start: 2019-05-14 | End: 2019-05-15 | Stop reason: HOSPADM

## 2019-05-14 RX ADMIN — HYDRALAZINE HYDROCHLORIDE 25 MG: 25 TABLET, FILM COATED ORAL at 22:29

## 2019-05-14 RX ADMIN — POTASSIUM CHLORIDE 40 MEQ: 750 CAPSULE, EXTENDED RELEASE ORAL at 10:39

## 2019-05-14 RX ADMIN — SODIUM CHLORIDE, PRESERVATIVE FREE 3 ML: 5 INJECTION INTRAVENOUS at 08:02

## 2019-05-14 RX ADMIN — SODIUM CHLORIDE, PRESERVATIVE FREE 3 ML: 5 INJECTION INTRAVENOUS at 20:43

## 2019-05-14 RX ADMIN — POTASSIUM CHLORIDE 40 MEQ: 750 CAPSULE, EXTENDED RELEASE ORAL at 18:26

## 2019-05-14 RX ADMIN — ASPIRIN 81 MG: 81 TABLET, DELAYED RELEASE ORAL at 08:01

## 2019-05-14 RX ADMIN — ERTAPENEM SODIUM 1 G: 1 INJECTION, POWDER, LYOPHILIZED, FOR SOLUTION INTRAMUSCULAR; INTRAVENOUS at 22:37

## 2019-05-14 RX ADMIN — POTASSIUM CHLORIDE 40 MEQ: 750 CAPSULE, EXTENDED RELEASE ORAL at 15:01

## 2019-05-14 RX ADMIN — LISINOPRIL 5 MG: 5 TABLET ORAL at 08:01

## 2019-05-14 RX ADMIN — INSULIN LISPRO 3 UNITS: 100 INJECTION, SOLUTION INTRAVENOUS; SUBCUTANEOUS at 11:52

## 2019-05-14 NOTE — H&P
Patient Care Team:  Magali Burgess MD as PCP - General (Internal Medicine)    Chief complaint poor appetite    Subjective     History of Present Illness  This is a 78-year-old gentleman who has a history of type 2 diabetes and is on oral hypoglycemic medication for this whose blood sugars are reasonably well controlled at home who comes to the hospital because of nausea and vomiting and generalized weakness.  The patient was diagnosed with a urinary tract infection about 2 weeks ago and was given Augmentin for this.  The patient has continued to feel poorly and blood in his urine and low back pain.    In the emergency room the patient had a CT scan and the CT scan nonobstructing stones.  Also shows hairy nephric haziness.  There is also slight enlargement of the prostate.  Patient is also found to have at least one gallstone within the gallbladder measuring up to 2.2 cm.  There is also noted to be mild generalized wall thickening of the descending colon and rectosigmoid colon.  The patient does not have any diarrhea.    Review of Systems   Constitutional: Positive for appetite change. Negative for chills and fever.   HENT: Negative for postnasal drip and rhinorrhea.    Respiratory: Negative for chest tightness and shortness of breath.    Cardiovascular: Negative for chest pain and palpitations.   Gastrointestinal: Positive for vomiting (  vomited 3 days ago). Negative for diarrhea and nausea.   Endocrine: Negative for cold intolerance and heat intolerance.   Genitourinary: Positive for dysuria ( had it last week). Negative for difficulty urinating.   Musculoskeletal: Negative for arthralgias and back pain.   Neurological: Positive for weakness. Negative for headaches.   Psychiatric/Behavioral: Negative for agitation and behavioral problems.        Past Medical History:   Diagnosis Date   • Diabetes mellitus (CMS/HCC)    • Hypertension      Past Surgical History:   Procedure Laterality Date   • NO PAST  SURGERIES       History reviewed. No pertinent family history.  Social History     Tobacco Use   • Smoking status: Never Smoker   Substance Use Topics   • Alcohol use: No     Frequency: Never   • Drug use: Not on file     Medications Prior to Admission   Medication Sig Dispense Refill Last Dose   • aspirin 81 MG EC tablet Take 81 mg by mouth Daily.      • furosemide (LASIX) 40 MG tablet Take 40 mg by mouth Daily.      • glipiZIDE (GLUCOTROL) 10 MG tablet Take 10 mg by mouth 2 (Two) Times a Day Before Meals.      • lisinopril (PRINIVIL,ZESTRIL) 2.5 MG tablet Take 2.5 mg by mouth Daily.      • metFORMIN (GLUCOPHAGE) 1000 MG tablet Take 1,000 mg by mouth 2 (Two) Times a Day With Meals.      • amoxicillin-clavulanate (AUGMENTIN) 875-125 MG per tablet Take 1 tablet by mouth 2 (Two) Times a Day.        Allergies:  Patient has no known allergies.    Objective      Vital Signs  Temp:  [99.8 °F (37.7 °C)] 99.8 °F (37.7 °C)  Heart Rate:  [] 88  Resp:  [16-18] 16  BP: (183-227)/(89-98) 183/90    Physical Exam   Constitutional: He appears well-developed and well-nourished.   HENT:   Head: Normocephalic and atraumatic.   Eyes: Conjunctivae and EOM are normal.   Neck: Normal range of motion. Neck supple.   Cardiovascular: Exam reveals no friction rub.   Murmur ( radiates to the neck) heard.  Pulmonary/Chest: Effort normal and breath sounds normal. No stridor. No respiratory distress. He has no wheezes.   Abdominal: Soft. Bowel sounds are normal. He exhibits no distension. There is no tenderness.   Musculoskeletal: He exhibits no edema or deformity.   Neurological: He is alert.   Skin: Skin is warm and dry.       Results Review:   I reviewed the patient's new clinical results.  I reviewed the patient's new imaging results and agree with the interpretation.  I personally viewed and interpreted the patient's EKG/Telemetry data      Assessment/Plan       Pyelonephritis    HTN (hypertension)    DM2 (diabetes mellitus, type 2)  (CMS/[facility])      Assessment & Plan    Pyelonephritis-Levaquin given in the emergency room  -Urine culture is pending   -Invanz tonight to cover for ESBL organisms  -The recurrence of symptoms could possibly related to prostatitis  -Will need urology to see      HTN (hypertension)-blood pressure was quite elevated in the emergency room.  Given the urinary tract disease wanting to make sure her blood pressure does not drop too quickly.  I will go up on his lisinopril to 5 mg.      DM2 (diabetes mellitus, type 2) (CMS/[facility])  -Hold on his oral hypoglycemic medications and start the patient on sliding scale insulin    Hypokalemia -40 given in the emergency room will give another 20meq and recheck in the am    Colitis-maybe had diarrhea 3 weeks ago, no diarrhea currently    Murmur-echo    I discussed the patients findings and my recommendations with patient and consulting provider    Jimmy Lee MD  05/13/19  8:37 PM    Time:

## 2019-05-14 NOTE — PLAN OF CARE
Problem: Patient Care Overview  Goal: Plan of Care Review  Outcome: Ongoing (interventions implemented as appropriate)   05/14/19 0359 05/14/19 1522   Plan of Care Review   Progress improving --    OTHER   Outcome Summary --  pt resting well during shift with no c/o pain or SOA. K+ 2.8 at morning draw, K+ protocol started and pt tolerating well. clear liquid diet, ambulating in room, family at bedside will continue to monitor.   Coping/Psychosocial   Plan of Care Reviewed    05/14/19 0359 05/14/19 1522   Plan of Care Review   Progress improving --    OTHER   Outcome Summary --  pt resting well during shift with no c/o pain or SOA. K+ 2.8 at morning draw, K+ protocol started and pt tolerating well. clear liquid diet, ambulating in room, family at bedside will continue to monitor.   Coping/Psychosocial   Plan of Care Reviewed With --  patient   With --  patient

## 2019-05-14 NOTE — PROGRESS NOTES
Name: Raymon Solis ADMIT: 2019   : 1940  PCP: Magali Burgess MD    MRN: [id number] LOS: 1 days   AGE/SEX: 78 y.o. male  ROOM: [facility]     Subjective   Subjective   Generalized malaise but tolerating clear liquid diet and would like to eat more food.  Denies nausea vomiting abdominal pain or flank pain  No fever or chills, CP, SOA     Objective   Objective   Vital Signs  Temp:  [98.4 °F (36.9 °C)-98.8 °F (37.1 °C)] 98.4 °F (36.9 °C)  Heart Rate:  [70-90] 79  Resp:  [16-18] 18  BP: (145-227)/(79-98) 145/79  SpO2:  [94 %-98 %] 98 %  on   ;   Device (Oxygen Therapy): room air  Body mass index is 31.07 kg/m².  Physical Exam   Constitutional: He is oriented to person, place, and time. He appears well-developed and well-nourished. No distress.   HENT:   Head: Normocephalic and atraumatic.   Mouth/Throat: Oropharynx is clear and moist.   Eyes: Conjunctivae and EOM are normal.   Neck: Normal range of motion. No tracheal deviation present.   Cardiovascular: Normal rate and regular rhythm.   Murmur present    Pulmonary/Chest: Effort normal and breath sounds normal. No respiratory distress.   Abdominal: Soft. Bowel sounds are normal. He exhibits no distension and no mass. There is no tenderness. There is no rebound and no guarding.   Musculoskeletal: He exhibits no edema or deformity.   Neurological: He is alert and oriented to person, place, and time. No cranial nerve deficit.   Skin: Skin is warm and dry. Capillary refill takes less than 2 seconds. There is pallor.   Psychiatric: He has a normal mood and affect. Judgment normal.   Nursing note and vitals reviewed.      Results Review:       I reviewed the patient's new clinical results.  Results from last 7 days   Lab Units 19  0535 19  1657   WBC 10*3/mm3 11.27* 13.33*   HEMOGLOBIN g/dL 11.2* 12.1*   PLATELETS 10*3/mm3 330 371     Results from last 7 days   Lab Units 19  0535 19  1657   SODIUM mmol/L 137 134*   POTASSIUM mmol/L 2.8*  3.0*   CHLORIDE mmol/L 96* 92*   CO2 mmol/L 30.7* 29.4*   BUN mg/dL 7* 13   CREATININE mg/dL 0.85 1.04   GLUCOSE mg/dL 154* 199*   Estimated Creatinine Clearance: 69.3 mL/min (by C-G formula based on SCr of 0.85 mg/dL).  Results from last 7 days   Lab Units 05/13/19  1657   ALBUMIN g/dL 3.90   BILIRUBIN mg/dL 0.8   ALK PHOS U/L 173*   AST (SGOT) U/L 46*   ALT (SGPT) U/L 29     Results from last 7 days   Lab Units 05/14/19  0535 05/13/19  1657   CALCIUM mg/dL 9.3 10.0   ALBUMIN g/dL  --  3.90   MAGNESIUM mg/dL  --  2.1       Glucose   Date/Time Value Ref Range Status   05/14/2019 1132 205 (H) 70 - 130 mg/dL Final   05/14/2019 0606 140 (H) 70 - 130 mg/dL Final   05/13/2019 2248 180 (H) 70 - 130 mg/dL Final         aspirin 81 mg Oral Daily   ertapenem 1 g Intravenous Q24H   insulin lispro 0-7 Units Subcutaneous 4x Daily With Meals & Nightly   lisinopril 5 mg Oral Daily   sodium chloride 3 mL Intravenous Q12H      Diet Full Liquid; Consistent Carbohydrate, Cardiac       Assessment/Plan     Active Hospital Problems    Diagnosis  POA   • Elevated alkaline phosphatase level [R74.8]  Unknown   • Pyelonephritis [N12]  Yes   • HTN (hypertension) [I10]  Unknown   • DM2 (diabetes mellitus, type 2) (CMS/[facility]) [E11.9]  Unknown   • Hypokalemia [E87.6]  Unknown      Resolved Hospital Problems   No resolved problems to display.    Assessment & Plan  Pyelonephritis   - WBC trending down and afebrile   -Urine culture prelim result with gram negative bacilli   -No history of ESBL but will continue Invanz started by admitting MD since he is responding well to treatment.    - cancel Urology consult      HTN (hypertension)-  BP elevated in ED up to 227/98 but better this afternoon  Lisinopril increased from 2.5 to  5 mg on admission with improved control.   Add PRN hydralazine for SBP > 180      DM2 (diabetes mellitus, type 2) (CMS/[facility])  -Hold on his oral hypoglycemic medications and start the patient on sliding scale insulin  -Check  HgbA1c     Elevated alkaline phosphatase  -Repeat CMP in am and if abnormal will proceed with further workup      Hypokalemia -   -Continue to hold lasix  -Replace K per protocol      Colitis-maybe had diarrhea 3 weeks ago, no diarrhea currently and no GI complaints      Murmur-echo report pending. Not established with cardiologist         VTE Prophylaxis - SCDs   Code Status - Full code  Disposition - Anticipate discharge to home      COMPA Junior  [facility]  05/14/19  4:13 PM

## 2019-05-14 NOTE — PLAN OF CARE
Problem: Patient Care Overview  Goal: Plan of Care Review  Continue symptom management and comfort care   05/13/19 2103 05/14/19 0359   Plan of Care Review   Progress --  improving   OTHER   Outcome Summary --  Patient rested well overnight. No complaints of pain or nausea. IV ABX administered as per ordered. Family at bedside. Will continue to monitor.   Coping/Psychosocial   Plan of Care Reviewed With patient;daughter;son --

## 2019-05-14 NOTE — CONSULTS
"Adult Nutrition  Assessment/PES    Patient Name:  Raymon Solis  YOB: 1940  MRN: [id number]  Admit Date:  5/13/2019    Assessment Date:  5/14/2019    Comments:  Nursing admission screen consult for decreased appetite, which has been associated with pt's acute pyelonephritis. Nausea & vomiting reported prior to admission. Currently on clear liquids, tolerating. Will follow clinical course & PO intake as diet able to be advanced to solids.     Reason for Assessment     Row Name 05/14/19 1011          Reason for Assessment    Reason For Assessment  nurse/nurse practitioner consult     Diagnosis  infection/sepsis;cardiac disease;diabetes diagnosis/complications Dx pyelonephritis; hx htn, db2, recent colitis (3 wk ago)     Identified At Risk by Screening Criteria  MST SCORE 2+;other (see comments) unsure weight loss, decreased appetite/eating poorly         Nutrition/Diet History     Row Name 05/14/19 1012          Nutrition/Diet History    Typical Food/Fluid Intake  Per ER nurse note, family reported pt has had decreased water and food intake, upset stomach, n/v, and dizziness that began 1 week ago. Dx with UTI almost 2 weeks ago and can't keep down antibiotics.      Factors Affecting Nutritional Intake  altered gastrointestinal function;nausea;vomiting;pain;other (see comments) low back pain         Anthropometrics     Row Name 05/14/19 1014 05/14/19 0853       Anthropometrics    Height  162.6 cm (64\")  162.6 cm (64\")    Weight  --  82.1 kg (181 lb)       Admit Weight    Admit Weight  82.1 kg (181 lb)  --       Ideal Body Weight (IBW)    Ideal Body Weight (IBW) (kg)  59.72  59.72    % Ideal Body Weight  --  137.49       Usual Body Weight (UBW)    Usual Body Weight  -- limited wt hx on file; Herrera 2012 wt 191 lb.  --       Body Mass Index (BMI)    BMI (kg/m2)  --  31.13    BMI Assessment  BMI 30-34.9: obesity grade I  --        Labs/Tests/Procedures/Meds     Row Name 05/14/19 1016          " Labs/Procedures/Meds    Lab Results Reviewed  reviewed, pertinent     Lab Results Comments  K 2.8, Cl, Glu, WBC, h/h        Diagnostic Tests/Procedures    Diagnostic Test/Procedure Reviewed  reviewed, pertinent     Diagnostic Test/Procedures Comments  ECHO for heart murmur pending        Medications    Pertinent Medications Reviewed  reviewed, pertinent     Pertinent Medications Comments  abx, insulin         Physical Findings     Row Name 05/14/19 1017          Physical Findings    Gastrointestinal  nausea n/v being treated; RN indicates no further c/o. Rested well thru the night. Sitting abed currently.      Skin  other (see comments) skin intact, jeanna 20         Nutrition Prescription Ordered     Row Name 05/14/19 1021          Nutrition Prescription PO    Current PO Diet  Clear Liquid          PO Evaluation    % PO Intake  360 ml (last night)  --           Problem/Interventions:  Problem 1     Row Name 05/14/19 1029          Nutrition Diagnoses Problem 1    Problem 1  Nutrition Appropriate for Condition at this Time     Etiology (related to)  Medical Diagnosis     Infectious Disease  UTI     Signs/Symptoms (evidenced by)  PO diet not tolerated;Report of Mnimal PO Intake;Report/Observation     Reported/Observed By  Family     Appetite  Poor at this time     Reported GI Symptoms  Nausea and vomiting     Other Comment  Dx pyelonephritis; decreased appetite/intake x 1-2 weeks associated with this infection                 Intervention Goal     Row Name 05/14/19 1030          Intervention Goal    General  Maintain nutrition;Reduce/improve symptoms;Meet nutritional needs for age/condition;Disease management/therapy     PO  Tolerate PO;Advance diet;PO intake (%)     Weight  No significant weight loss         Nutrition Intervention     Row Name 05/14/19 1033          Nutrition Intervention    RD/Tech Action  Follow Tx progress;Care plan reviewd           Education/Evaluation     Row Name 05/14/19 1035           Education    Education  Will Instruct as appropriate        Monitor/Evaluation    Monitor  Per protocol           Electronically signed by:  Ally Lai RD  05/14/19 10:59 AM

## 2019-05-14 NOTE — PLAN OF CARE
Problem: Patient Care Overview  Goal: Individualization and Mutuality  Outcome: Ongoing (interventions implemented as appropriate)   05/14/19 1521   Individualization   Patient Specific Interventions IV abx, K+ protocol       Problem: Urinary Tract Infection (Adult)  Goal: Signs and Symptoms of Listed Potential Problems Will be Absent, Minimized or Managed (Urinary Tract Infection)  Outcome: Ongoing (interventions implemented as appropriate)

## 2019-05-15 VITALS
DIASTOLIC BLOOD PRESSURE: 72 MMHG | OXYGEN SATURATION: 96 % | TEMPERATURE: 99.2 F | WEIGHT: 181 LBS | HEIGHT: 64 IN | RESPIRATION RATE: 18 BRPM | BODY MASS INDEX: 30.9 KG/M2 | SYSTOLIC BLOOD PRESSURE: 147 MMHG | HEART RATE: 104 BPM

## 2019-05-15 PROBLEM — N10 ACUTE PYELONEPHRITIS: Status: ACTIVE | Noted: 2019-05-13

## 2019-05-15 PROBLEM — I35.0 AORTIC STENOSIS: Status: ACTIVE | Noted: 2019-05-15

## 2019-05-15 LAB
ALBUMIN SERPL-MCNC: 2.8 G/DL (ref 3.5–5.2)
ALBUMIN/GLOB SERPL: 0.8 G/DL
ALP SERPL-CCNC: 141 U/L (ref 39–117)
ALT SERPL W P-5'-P-CCNC: 20 U/L (ref 1–41)
ANION GAP SERPL CALCULATED.3IONS-SCNC: 11.8 MMOL/L
AST SERPL-CCNC: 22 U/L (ref 1–40)
BACTERIA SPEC AEROBE CULT: ABNORMAL
BASOPHILS # BLD AUTO: 0.07 10*3/MM3 (ref 0–0.2)
BASOPHILS NFR BLD AUTO: 0.6 % (ref 0–1.5)
BILIRUB SERPL-MCNC: 0.4 MG/DL (ref 0.2–1.2)
BUN BLD-MCNC: 6 MG/DL (ref 8–23)
BUN/CREAT SERPL: 8.1 (ref 7–25)
CALCIUM SPEC-SCNC: 9.9 MG/DL (ref 8.6–10.5)
CHLORIDE SERPL-SCNC: 103 MMOL/L (ref 98–107)
CO2 SERPL-SCNC: 27.2 MMOL/L (ref 22–29)
CREAT BLD-MCNC: 0.74 MG/DL (ref 0.76–1.27)
DEPRECATED RDW RBC AUTO: 41.7 FL (ref 37–54)
EOSINOPHIL # BLD AUTO: 0.19 10*3/MM3 (ref 0–0.4)
EOSINOPHIL NFR BLD AUTO: 1.7 % (ref 0.3–6.2)
ERYTHROCYTE [DISTWIDTH] IN BLOOD BY AUTOMATED COUNT: 12.7 % (ref 12.3–15.4)
GFR SERPL CREATININE-BSD FRML MDRD: 102 ML/MIN/1.73
GFR SERPL CREATININE-BSD FRML MDRD: 124 ML/MIN/1.73
GLOBULIN UR ELPH-MCNC: 3.5 GM/DL
GLUCOSE BLD-MCNC: 142 MG/DL (ref 65–99)
GLUCOSE BLDC GLUCOMTR-MCNC: 116 MG/DL (ref 70–130)
GLUCOSE BLDC GLUCOMTR-MCNC: 185 MG/DL (ref 70–130)
HBA1C MFR BLD: 7.8 % (ref 4.8–5.6)
HCT VFR BLD AUTO: 36.7 % (ref 37.5–51)
HGB BLD-MCNC: 11.7 G/DL (ref 13–17.7)
IMM GRANULOCYTES # BLD AUTO: 0.05 10*3/MM3 (ref 0–0.05)
IMM GRANULOCYTES NFR BLD AUTO: 0.5 % (ref 0–0.5)
LYMPHOCYTES # BLD AUTO: 1.42 10*3/MM3 (ref 0.7–3.1)
LYMPHOCYTES NFR BLD AUTO: 12.8 % (ref 19.6–45.3)
MCH RBC QN AUTO: 29 PG (ref 26.6–33)
MCHC RBC AUTO-ENTMCNC: 31.9 G/DL (ref 31.5–35.7)
MCV RBC AUTO: 91.1 FL (ref 79–97)
MONOCYTES # BLD AUTO: 1.18 10*3/MM3 (ref 0.1–0.9)
MONOCYTES NFR BLD AUTO: 10.6 % (ref 5–12)
NEUTROPHILS # BLD AUTO: 8.17 10*3/MM3 (ref 1.7–7)
NEUTROPHILS NFR BLD AUTO: 73.8 % (ref 42.7–76)
NRBC BLD AUTO-RTO: 0 /100 WBC (ref 0–0.2)
PLATELET # BLD AUTO: 334 10*3/MM3 (ref 140–450)
PMV BLD AUTO: 9.7 FL (ref 6–12)
POTASSIUM BLD-SCNC: 3.6 MMOL/L (ref 3.5–5.2)
PROT SERPL-MCNC: 6.3 G/DL (ref 6–8.5)
RBC # BLD AUTO: 4.03 10*6/MM3 (ref 4.14–5.8)
SODIUM BLD-SCNC: 142 MMOL/L (ref 136–145)
WBC NRBC COR # BLD: 11.08 10*3/MM3 (ref 3.4–10.8)

## 2019-05-15 PROCEDURE — G0378 HOSPITAL OBSERVATION PER HR: HCPCS

## 2019-05-15 PROCEDURE — 80053 COMPREHEN METABOLIC PANEL: CPT | Performed by: NURSE PRACTITIONER

## 2019-05-15 PROCEDURE — 82962 GLUCOSE BLOOD TEST: CPT

## 2019-05-15 PROCEDURE — 85025 COMPLETE CBC W/AUTO DIFF WBC: CPT | Performed by: HOSPITALIST

## 2019-05-15 PROCEDURE — 83036 HEMOGLOBIN GLYCOSYLATED A1C: CPT | Performed by: NURSE PRACTITIONER

## 2019-05-15 PROCEDURE — 63710000001 INSULIN LISPRO (HUMAN) PER 5 UNITS: Performed by: HOSPITALIST

## 2019-05-15 RX ORDER — POTASSIUM CHLORIDE 750 MG/1
10 TABLET, FILM COATED, EXTENDED RELEASE ORAL DAILY
Qty: 30 TABLET | Refills: 0 | Status: SHIPPED | OUTPATIENT
Start: 2019-05-15 | End: 2020-01-24 | Stop reason: HOSPADM

## 2019-05-15 RX ORDER — TAMSULOSIN HYDROCHLORIDE 0.4 MG/1
1 CAPSULE ORAL NIGHTLY
Qty: 30 CAPSULE | Refills: 0 | Status: SHIPPED | OUTPATIENT
Start: 2019-05-15 | End: 2020-01-16 | Stop reason: HOSPADM

## 2019-05-15 RX ORDER — AMLODIPINE BESYLATE 5 MG/1
5 TABLET ORAL
Status: DISCONTINUED | OUTPATIENT
Start: 2019-05-15 | End: 2019-05-15 | Stop reason: HOSPADM

## 2019-05-15 RX ORDER — FUROSEMIDE 40 MG/1
20 TABLET ORAL DAILY
Start: 2019-05-15 | End: 2020-01-24 | Stop reason: HOSPADM

## 2019-05-15 RX ORDER — LISINOPRIL 10 MG/1
10 TABLET ORAL DAILY
Qty: 30 TABLET | Refills: 0 | Status: SHIPPED | OUTPATIENT
Start: 2019-05-15 | End: 2020-01-24 | Stop reason: HOSPADM

## 2019-05-15 RX ORDER — CEPHALEXIN 500 MG/1
500 CAPSULE ORAL 3 TIMES DAILY
Qty: 20 CAPSULE | Refills: 0 | Status: SHIPPED | OUTPATIENT
Start: 2019-05-15 | End: 2019-05-22

## 2019-05-15 RX ORDER — AMLODIPINE BESYLATE 5 MG/1
5 TABLET ORAL
Status: DISCONTINUED | OUTPATIENT
Start: 2019-05-15 | End: 2019-05-15

## 2019-05-15 RX ORDER — CEPHALEXIN 500 MG/1
500 CAPSULE ORAL 3 TIMES DAILY
Status: DISCONTINUED | OUTPATIENT
Start: 2019-05-15 | End: 2019-05-15 | Stop reason: HOSPADM

## 2019-05-15 RX ORDER — AMLODIPINE BESYLATE 5 MG/1
5 TABLET ORAL
Qty: 30 TABLET | Refills: 0 | Status: SHIPPED | OUTPATIENT
Start: 2019-05-16 | End: 2020-01-24 | Stop reason: HOSPADM

## 2019-05-15 RX ORDER — LISINOPRIL 10 MG/1
10 TABLET ORAL DAILY
Status: DISCONTINUED | OUTPATIENT
Start: 2019-05-16 | End: 2019-05-15 | Stop reason: HOSPADM

## 2019-05-15 RX ADMIN — HYDRALAZINE HYDROCHLORIDE 25 MG: 25 TABLET, FILM COATED ORAL at 10:45

## 2019-05-15 RX ADMIN — LISINOPRIL 5 MG: 5 TABLET ORAL at 08:00

## 2019-05-15 RX ADMIN — AMLODIPINE BESYLATE 5 MG: 5 TABLET ORAL at 09:37

## 2019-05-15 RX ADMIN — ASPIRIN 81 MG: 81 TABLET, DELAYED RELEASE ORAL at 08:00

## 2019-05-15 RX ADMIN — SODIUM CHLORIDE, PRESERVATIVE FREE 3 ML: 5 INJECTION INTRAVENOUS at 08:00

## 2019-05-15 RX ADMIN — INSULIN LISPRO 2 UNITS: 100 INJECTION, SOLUTION INTRAVENOUS; SUBCUTANEOUS at 11:48

## 2019-05-15 RX ADMIN — CEPHALEXIN 500 MG: 500 CAPSULE ORAL at 09:37

## 2019-05-15 NOTE — DISCHARGE SUMMARY
NAME: Raymon Solis ADMIT: 2019   : 1940  PCP: Magali Burgess MD    MRN: [id number] LOS: 2 days   AGE/SEX: 78 y.o. male  ROOM: P492/1     Date of Admission:  2019  Date of Discharge:  5/15/2019    PCP: Magali Burgess MD    CHIEF COMPLAINT  Vomiting and Decreased Intake      DISCHARGE DIAGNOSIS  Active Hospital Problems    Diagnosis  POA   • **Acute pyelonephritis [N10]  Yes   • Aortic stenosis [I35.0]  Yes   • Elevated alkaline phosphatase level [R74.8]  Yes   • HTN (hypertension) [I10]  Yes   • DM2 (diabetes mellitus, type 2) (CMS/HCC) [E11.9]  Yes   • Hypokalemia [E87.6]  Yes      Resolved Hospital Problems   No resolved problems to display.       SECONDARY DIAGNOSES  Past Medical History:   Diagnosis Date   • Diabetes mellitus (CMS/HCC)    • Hypertension        HOSPITAL COURSE  Patient is a 78 y.o. male with history of diabetes and hypertension who presented with hematuria and lower back pain. In the emergency room the patient had a CT scan and the CT scan nonobstructing stones.  Also shows hairy nephric haziness.  There is also slight enlargement of the prostate.  Patient is also found to have at least one gallstone within the gallbladder measuring up to 2.2 cm.  There is also noted to be mild generalized wall thickening of the descending colon and rectosigmoid colon.  The patient does not have any diarrhea.    He was started on IV antibiotics as well as IV fluids.  He had improvement of his symptoms and urine cultures ended up showing bacteria sensitive to oral antibiotics and he will be changed to p.o. Keflex to complete 7 additional days.  Enlarged prostate on CT scan will have him on Flomax now and follow-up with urology as an outpatient.    His blood pressure was uncontrolled and agents have been titrated up.  He should closely follow with Magali Burgess MD.    He had heart murmur and echo showed moderate aortic stenosis.  We will have him follow with cardiology as an  outpatient.    DIAGNOSTICS        2D ECHO 5/14/19  · Left ventricular diastolic dysfunction (grade I) consistent with impaired relaxation.  · Estimated EF = 73%.  · Left ventricular systolic function is hyperdynamic (EF > 70).  · Left atrial cavity size is mildly dilated.  · Moderate aortic valve stenosis is present.  · Aortic valve maximum pressure gradient is 51.0 mmHg.  · Aortic valve mean pressure gradient is 28.0 mmHg.  · Aortic valve area is 1.0 cm2.  · There is moderate calcification of the aortic valve.    05/13/2019 1824  05/15/2019 0001  Urine Culture - Urine, Urine, Clean Catch [[id number]]    (Abnormal)   Urine, Clean Catch     Final result  Urine Culture >100,000 CFU/mL Escherichia coli Abnormal        Susceptibility      Escherichia coli     VENKAT     Ampicillin >=32  Resistant     Ampicillin + Sulbactam 16  Intermediate     Cefazolin <=4  Susceptible     Cefepime <=1  Susceptible     Ceftazidime <=1  Susceptible     Ceftriaxone <=1  Susceptible     Gentamicin <=1  Susceptible     Levofloxacin 1  Susceptible     Nitrofurantoin <=16  Susceptible     Piperacillin + Tazobactam <=4  Susceptible     Tetracycline <=1  Susceptible     Trimethoprim + Sulfamethoxazole <=20  Susceptible                      05/15/2019 0350  05/15/2019 0422  Hemoglobin A1c [[id number]]    (Abnormal)   Blood     Final result  Hemoglobin A1C 7.80 Abnormally high  %          05/15/2019 0350  05/15/2019 0438  Comprehensive Metabolic Panel [[id number]]    (Abnormal)   Blood     Final result  Glucose 142 Abnormally high  mg/dL   BUN 6 Abnormally low  mg/dL   Creatinine 0.74 Abnormally low  mg/dL   Sodium 142 mmol/L   Potassium 3.6 mmol/L   Chloride 103 mmol/L   CO2 27.2 mmol/L   Calcium 9.9 mg/dL   Total Protein 6.3 g/dL   Albumin 2.80 Abnormally low  g/dL   ALT (SGPT) 20 U/L   AST (SGOT) 22 U/L   Alkaline Phosphatase 141 Abnormally high  U/L   Total Bilirubin 0.4 mg/dL   eGFR Non African Am 102 mL/min/1.73   eGFR African Am 124 mL/min/1.73    Globulin 3.5 gm/dL   A/G Ratio 0.8 g/dL   BUN/Creatinine Ratio 8.1    Anion Gap 11.8 mmol/L          05/15/2019 0350  05/15/2019 0413  CBC Auto Differential [[id number]]   (Abnormal)   Blood     Final result  WBC 11.08 Abnormally high  10*3/mm3   RBC 4.03 Abnormally low  10*6/mm3   Hemoglobin 11.7 Abnormally low  g/dL   Hematocrit 36.7 Abnormally low  %   MCV 91.1 fL   MCH 29.0 pg   MCHC 31.9 g/dL   RDW 12.7 %   RDW-SD 41.7 fl   MPV 9.7 fL   Platelets 334 10*3/mm3   Neutrophil Rel % 73.8 %   Lymphocyte Rel % 12.8 Abnormally low  %              PHYSICAL EXAM  Objective    Alert  nad  No resp distress  +murmur    CONDITION ON DISCHARGE  Stable.      DISCHARGE DISPOSITION   Home or Self Care      DISCHARGE MEDICATIONS       Your medication list      START taking these medications      Instructions Last Dose Given Next Dose Due   amLODIPine 5 MG tablet  Commonly known as:  NORVASC  Start taking on:  5/16/2019      Take 1 tablet by mouth Daily.       cephalexin 500 MG capsule  Commonly known as:  KEFLEX      Take 1 capsule by mouth 3 (Three) Times a Day for 20 doses.       potassium chloride 10 MEQ CR tablet  Commonly known as:  K-DUR      Take 1 tablet by mouth Daily.       tamsulosin 0.4 MG capsule 24 hr capsule  Commonly known as:  FLOMAX      Take 1 capsule by mouth Every Night.          CHANGE how you take these medications      Instructions Last Dose Given Next Dose Due   furosemide 40 MG tablet  Commonly known as:  LASIX  What changed:  how much to take      Take 0.5 tablets by mouth Daily.       lisinopril 10 MG tablet  Commonly known as:  PRINIVILZESTRIL  What changed:    · medication strength  · how much to take      Take 1 tablet by mouth Daily.          CONTINUE taking these medications      Instructions Last Dose Given Next Dose Due   aspirin 81 MG EC tablet      Take 81 mg by mouth Daily.       glipiZIDE 10 MG tablet  Commonly known as:  GLUCOTROL      Take 10 mg by mouth 2 (Two) Times a Day Before  Meals.       metFORMIN 1000 MG tablet  Commonly known as:  GLUCOPHAGE      Take 1,000 mg by mouth 2 (Two) Times a Day With Meals.          STOP taking these medications    amoxicillin-clavulanate 875-125 MG per tablet  Commonly known as:  AUGMENTIN              Where to Get Your Medications      These medications were sent to [facility] [address] [place] [address] ([place], [place] - [address] [facility] - [phone]  - [phone] FX [address] [facility] ([facility] [address]    Phone:  [phone]   · amLODIPine 5 MG tablet  · cephalexin 500 MG capsule  · lisinopril 10 MG tablet  · potassium chloride 10 MEQ CR tablet  · tamsulosin 0.4 MG capsule 24 hr capsule     Information about where to get these medications is not yet available    Ask your nurse or doctor about these medications  · furosemide 40 MG tablet        No future appointments.  Additional Instructions for the Follow-ups that You Need to Schedule     Discharge Follow-up with Specialty: [facility]; 3 Weeks   As directed      Specialty:  [facility]    Follow Up:  3 Weeks    Follow Up Details:  UTI, prostate enlargement         Discharge Follow-up with Specialty: [facility]; 1 Month   As directed      Specialty:  [facility]    Follow Up:  1 Month    Follow Up Details:  new appointment to follow up aortic stenosis           Follow-up Information     Magali Burgess MD .    Specialty:  Internal Medicine  Contact information:  [address] [facility] [address]  [place] [id number] [phone]                   TEST  RESULTS PENDING AT DISCHARGE         Vishnu Nash MD  [facility]  05/15/19  10:25 AM      Time: greater than 32 minutes on discharge  It was a pleasure taking care of this patient while in the hospital.

## 2019-05-15 NOTE — PLAN OF CARE
Problem: Patient Care Overview  Goal: Plan of Care Review  Outcome: Ongoing (interventions implemented as appropriate)   05/15/19 0305   Plan of Care Review   Progress improving   OTHER   Outcome Summary Pt potassium corrected. Pt BP high this shift. Prn hydralazine given. Antibiotics continue. Pt has no c/o pain, n/v. Pt has been tearful this shift worrying about his family and his illlness. I reassured patient. Family at bedside, will continue to monitor for safety.    Coping/Psychosocial   Plan of Care Reviewed With patient     Goal: Individualization and Mutuality  Outcome: Ongoing (interventions implemented as appropriate)    Goal: Discharge Needs Assessment  Outcome: Ongoing (interventions implemented as appropriate)    Goal: Interprofessional Rounds/Family Conf  Outcome: Ongoing (interventions implemented as appropriate)      Problem: Urinary Tract Infection (Adult)  Goal: Signs and Symptoms of Listed Potential Problems Will be Absent, Minimized or Managed (Urinary Tract Infection)  Outcome: Ongoing (interventions implemented as appropriate)

## 2019-05-15 NOTE — PROGRESS NOTES
Case Management Discharge Note    Final Note: Discharged to home on 5/15/19. NICOLE Portillo RN, CCP.     Destination      No service has been selected for the patient.      Durable Medical Equipment      No service has been selected for the patient.      Dialysis/Infusion      No service has been selected for the patient.      Home Medical Care      No service has been selected for the patient.      Therapy      No service has been selected for the patient.      Community Resources      No service has been selected for the patient.             Final Discharge Disposition Code: 01 - home or self-care

## 2019-05-15 NOTE — CONSULTS
[facility] CONSULT      Patient Identification:  NAME:  Raymon Solis  Age:  78 y.o.   Sex:  male   :  1940   MRN:  [id number]       Chief complaint: Dysuria    History of present illness: This is a 78-year-old gentleman who speaks some broken English but comprehends everything I asked.  He has a son who is in the room and helps to converse as well.  Basically he has some baseline voiding symptoms of frequency weakness of stream some hesitancy but minimally bothersome.  He developed dysuria and some pelvic discomfort.  He is brought in for intolerance of oral antibiotics for this presumptive UTI and seems to be improving on IV antibiotics.  His urine cultures growing out gram-negative rods.  He has had no fevers no chills just irritable urinary symptoms.  He is on no prostatic meds at home.      Past medical history:  Past Medical History:   Diagnosis Date   • Diabetes mellitus (CMS/[facility])    • Hypertension        Past surgical history:  Past Surgical History:   Procedure Laterality Date   • NO PAST SURGERIES         Allergies:  Patient has no known allergies.    Home medications:  Medications Prior to Admission   Medication Sig Dispense Refill Last Dose   • aspirin 81 MG EC tablet Take 81 mg by mouth Daily.      • furosemide (LASIX) 40 MG tablet Take 40 mg by mouth Daily.      • glipiZIDE (GLUCOTROL) 10 MG tablet Take 10 mg by mouth 2 (Two) Times a Day Before Meals.      • lisinopril (PRINIVIL,ZESTRIL) 2.5 MG tablet Take 2.5 mg by mouth Daily.      • metFORMIN (GLUCOPHAGE) 1000 MG tablet Take 1,000 mg by mouth 2 (Two) Times a Day With Meals.      • amoxicillin-clavulanate (AUGMENTIN) 875-125 MG per tablet Take 1 tablet by mouth 2 (Two) Times a Day.           Hospital medications:    amLODIPine 5 mg Oral Q24H   aspirin 81 mg Oral Daily   cephalexin 500 mg Oral TID   insulin lispro 0-7 Units Subcutaneous 4x Daily With Meals & Nightly   [START ON 2019] lisinopril 10 mg Oral Daily   sodium  chloride 3 mL Intravenous Q12H        •  acetaminophen  •  dextrose  •  dextrose  •  glucagon (human recombinant)  •  hydrALAZINE  •  HYDROcodone-acetaminophen  •  potassium chloride **OR** potassium chloride **OR** potassium chloride  •  sodium chloride  •  sodium chloride    Family history:  History reviewed. No pertinent family history.    Social history:  Social History     Tobacco Use   • Smoking status: Never Smoker   Substance Use Topics   • Alcohol use: No     Frequency: Never   • Drug use: Not on file       Review of systems:    Negative 12-system ROS except for the following: As above in regards to his irritable urinary symptoms.  Nausea and vomiting recently from oral antibiotics.      Objective:  TMax 24 hours:   Temp (24hrs), Av.6 °F (37 °C), Min:97.9 °F (36.6 °C), Max:99.2 °F (37.3 °C)      Vitals Ranges:   Temp:  [97.9 °F (36.6 °C)-99.2 °F (37.3 °C)] 99.2 °F (37.3 °C)  Heart Rate:  [75-88] 88  Resp:  [18] 18  BP: (145-200)/(78-94) 191/88    Intake/Output Last 3 shifts:  I/O last 3 completed shifts:  In: 1460 [P.O.:360; IV Piggyback:1100]  Out: 600 [Urine:600]     Physical Exam:       General Appearance:    Alert, cooperative, in no acute distress   Head:    Normocephalic, without obvious abnormality, atraumatic   Eyes:          PERRL, conjunctivae and corneas clear   Ears:    Normal external inspection   Throat:   No oral lesions, oral mucosa moist   Neck:   Supple, no LAD, trachea midline   Back:     No CVA tenderness   Lungs:     Respirations unlabored, symmetric excursion    Heart:    RRR, intact peripheral pulses   Abdomen:     Soft, NDNT, no masses, no guarding   :    Testes descended bilaterally, no nodules, no mass. Epididymi normal.  Penis normal.     No scrotal or penile rashes noted.   AMADOR:      Extremities:  Prostate is smooth and symmetrical.  There is no fluctuance.  Nothing overtly concerning for carcinoma nor any evidence of prostatitis.  No edema, no deformity   Skin:   No  bleeding, bruising or rashes   Neuro/Psych:   Orientation intact, mood/affect pleasant, no focal findings       Results review:   I reviewed the patient's new clinical results.    Data review:  Lab Results (last 24 hours)     Procedure Component Value Units Date/Time    POC Glucose Once [[id number]]  (Normal) Collected:  05/15/19 0630    Specimen:  Blood Updated:  05/15/19 0639     Glucose 116 mg/dL     Comprehensive Metabolic Panel [[id number]]  (Abnormal) Collected:  05/15/19 0350    Specimen:  Blood Updated:  05/15/19 0438     Glucose 142 mg/dL      BUN 6 mg/dL      Creatinine 0.74 mg/dL      Sodium 142 mmol/L      Potassium 3.6 mmol/L      Chloride 103 mmol/L      CO2 27.2 mmol/L      Calcium 9.9 mg/dL      Total Protein 6.3 g/dL      Albumin 2.80 g/dL      ALT (SGPT) 20 U/L      AST (SGOT) 22 U/L      Alkaline Phosphatase 141 U/L      Total Bilirubin 0.4 mg/dL      eGFR Non African Amer 102 mL/min/1.73      eGFR  African Amer 124 mL/min/1.73      Globulin 3.5 gm/dL      A/G Ratio 0.8 g/dL      BUN/Creatinine Ratio 8.1     Anion Gap 11.8 mmol/L     Narrative:       GFR Normal >60  Chronic Kidney Disease <60  Kidney Failure <15    Hemoglobin A1c [[id number]]  (Abnormal) Collected:  05/15/19 0350    Specimen:  Blood Updated:  05/15/19 0422     Hemoglobin A1C 7.80 %     Narrative:       Hemoglobin A1C Ranges:    Increased Risk for Diabetes  5.7% to 6.4%  Diabetes                     >= 6.5%  Diabetic Goal                < 7.0%    CBC & Differential [[id number]] Collected:  05/15/19 0350    Specimen:  Blood Updated:  05/15/19 0413    Narrative:       The following orders were created for panel order CBC & Differential.  Procedure                               Abnormality         Status                     ---------                               -----------         ------                     CBC Auto Differential[[id number]]        Abnormal            Final result                 Please view results for these tests on the  individual orders.    CBC Auto Differential [[id number]]  (Abnormal) Collected:  05/15/19 0350    Specimen:  Blood Updated:  05/15/19 0413     WBC 11.08 10*3/mm3      RBC 4.03 10*6/mm3      Hemoglobin 11.7 g/dL      Hematocrit 36.7 %      MCV 91.1 fL      MCH 29.0 pg      MCHC 31.9 g/dL      RDW 12.7 %      RDW-SD 41.7 fl      MPV 9.7 fL      Platelets 334 10*3/mm3      Neutrophil % 73.8 %      Lymphocyte % 12.8 %      Monocyte % 10.6 %      Eosinophil % 1.7 %      Basophil % 0.6 %      Immature Grans % 0.5 %      Neutrophils, Absolute 8.17 10*3/mm3      Lymphocytes, Absolute 1.42 10*3/mm3      Monocytes, Absolute 1.18 10*3/mm3      Eosinophils, Absolute 0.19 10*3/mm3      Basophils, Absolute 0.07 10*3/mm3      Immature Grans, Absolute 0.05 10*3/mm3      nRBC 0.0 /100 WBC     Urine Culture - Urine, Urine, Clean Catch [[id number]]  (Abnormal)  (Susceptibility) Collected:  05/13/19 1824    Specimen:  Urine, Clean Catch Updated:  05/15/19 0001     Urine Culture >100,000 CFU/mL Escherichia coli    Susceptibility      Escherichia coli     VENKAT     Ampicillin Resistant     Ampicillin + Sulbactam Intermediate     Cefazolin Susceptible     Cefepime Susceptible     Ceftazidime Susceptible     Ceftriaxone Susceptible     Gentamicin Susceptible     Levofloxacin Susceptible     Nitrofurantoin Susceptible     Piperacillin + Tazobactam Susceptible     Tetracycline Susceptible     Trimethoprim + Sulfamethoxazole Susceptible                    Potassium [[id number]]  (Normal) Collected:  05/14/19 2226    Specimen:  Blood Updated:  05/14/19 2347     Potassium 3.8 mmol/L     POC Glucose Once [[id number]]  (Abnormal) Collected:  05/14/19 2032    Specimen:  Blood Updated:  05/14/19 2044     Glucose 143 mg/dL     POC Glucose Once [[id number]]  (Normal) Collected:  05/14/19 1707    Specimen:  Blood Updated:  05/14/19 1708     Glucose 118 mg/dL     POC Glucose Once [[id number]]  (Normal) Collected:  05/14/19 1617    Specimen:  Blood Updated:   05/14/19 1621     Glucose 112 mg/dL     POC Glucose Once [[id number]]  (Abnormal) Collected:  05/14/19 1132    Specimen:  Blood Updated:  05/14/19 1138     Glucose 205 mg/dL            Imaging:  Imaging Results (last 24 hours)     ** No results found for the last 24 hours. **             Assessment:       Pyelonephritis    HTN (hypertension)    DM2 (diabetes mellitus, type 2) (CMS/[facility])    Hypokalemia    Elevated alkaline phosphatase level    Acute cystitis with no radiological evidence of pyelonephritis or obstructive uropathy.  Bilateral nonobstructing nephrolithiasis  Benign prostatic hyperplasia with mild baseline symptoms  Reported microhematuria in the past    Plan:     He can be converted to oral antibiotics once his cultures are back and probably be discharged.  He will need follow-up as an outpatient for cystoscopy to assess his lower urinary tract.  The question that he might have had some hematuria at one point.  This will need to be worked up further.    Steven Steel MD  05/15/19  8:39 AM

## 2019-05-15 NOTE — PROGRESS NOTES
Discharge Planning Assessment  [facility]     Patient Name: Raymon Solis  MRN: [id number]  Today's Date: 5/15/2019    Admit Date: 5/13/2019    Discharge Needs Assessment    No documentation.       Discharge Plan     Row Name 05/15/19 1209       Plan    Final Discharge Disposition Code  01 - home or self-care    Final Note  Discharged to home on 5/15/19. NICOLE Portillo RN, CCP.         Destination      No service coordination in this encounter.      Durable Medical Equipment      No service coordination in this encounter.      Dialysis/Infusion      No service coordination in this encounter.      Home Medical Care      No service coordination in this encounter.      Therapy      No service coordination in this encounter.      Community Resources      No service coordination in this encounter.        Expected Discharge Date and Time     Expected Discharge Date Expected Discharge Time    May 15, 2019         Demographic Summary    No documentation.       Functional Status    No documentation.       Psychosocial    No documentation.       Abuse/Neglect    No documentation.       Legal    No documentation.       Substance Abuse    No documentation.       Patient Forms     Row Name 05/15/19 1236       Patient Forms    Provider Choice List  Delivered    Delivered to  Patient    Patient Observation Letter  Delivered    Delivered to  Patient    Method of delivery  In person Gave copy BLANTON letter to patient. faxed to HIM. Placed original in patient's chart. NICOLE Portillo RN, CCP.             Sendy Portillo RN

## 2019-06-12 ENCOUNTER — APPOINTMENT (OUTPATIENT)
Dept: CT IMAGING | Facility: HOSPITAL | Age: 79
End: 2019-06-12

## 2019-06-12 ENCOUNTER — HOSPITAL ENCOUNTER (INPATIENT)
Facility: HOSPITAL | Age: 79
LOS: 2 days | Discharge: HOME HEALTH | End: 2019-06-14
Attending: EMERGENCY MEDICINE | Admitting: HOSPITALIST

## 2019-06-12 DIAGNOSIS — R51.9 GENERALIZED HEADACHE: ICD-10-CM

## 2019-06-12 DIAGNOSIS — N39.0 ACUTE UTI: Primary | ICD-10-CM

## 2019-06-12 DIAGNOSIS — E87.6 HYPOKALEMIA: ICD-10-CM

## 2019-06-12 DIAGNOSIS — R77.8 ELEVATED TROPONIN: ICD-10-CM

## 2019-06-12 DIAGNOSIS — N10 ACUTE PYELONEPHRITIS: ICD-10-CM

## 2019-06-12 DIAGNOSIS — R53.1 GENERALIZED WEAKNESS: ICD-10-CM

## 2019-06-12 LAB
ALBUMIN SERPL-MCNC: 3.1 G/DL (ref 3.5–5.2)
ALBUMIN/GLOB SERPL: 0.8 G/DL
ALP SERPL-CCNC: 212 U/L (ref 39–117)
ALT SERPL W P-5'-P-CCNC: 20 U/L (ref 1–41)
ANION GAP SERPL CALCULATED.3IONS-SCNC: 16.6 MMOL/L
AST SERPL-CCNC: 23 U/L (ref 1–40)
BACTERIA UR QL AUTO: ABNORMAL /HPF
BASOPHILS # BLD AUTO: 0.08 10*3/MM3 (ref 0–0.2)
BASOPHILS NFR BLD AUTO: 0.6 % (ref 0–1.5)
BILIRUB SERPL-MCNC: 1.1 MG/DL (ref 0.2–1.2)
BILIRUB UR QL STRIP: NEGATIVE
BUN BLD-MCNC: 17 MG/DL (ref 8–23)
BUN/CREAT SERPL: 16.7 (ref 7–25)
CALCIUM SPEC-SCNC: 10.4 MG/DL (ref 8.6–10.5)
CHLORIDE SERPL-SCNC: 94 MMOL/L (ref 98–107)
CK SERPL-CCNC: 18 U/L (ref 20–200)
CLARITY UR: ABNORMAL
CO2 SERPL-SCNC: 32.4 MMOL/L (ref 22–29)
COLOR UR: ABNORMAL
CREAT BLD-MCNC: 1.02 MG/DL (ref 0.76–1.27)
D-LACTATE SERPL-SCNC: 1.4 MMOL/L (ref 0.5–2)
DEPRECATED RDW RBC AUTO: 41.7 FL (ref 37–54)
EOSINOPHIL # BLD AUTO: 0.13 10*3/MM3 (ref 0–0.4)
EOSINOPHIL NFR BLD AUTO: 1 % (ref 0.3–6.2)
ERYTHROCYTE [DISTWIDTH] IN BLOOD BY AUTOMATED COUNT: 13 % (ref 12.3–15.4)
GFR SERPL CREATININE-BSD FRML MDRD: 71 ML/MIN/1.73
GFR SERPL CREATININE-BSD FRML MDRD: 86 ML/MIN/1.73
GLOBULIN UR ELPH-MCNC: 3.8 GM/DL
GLUCOSE BLD-MCNC: 182 MG/DL (ref 65–99)
GLUCOSE UR STRIP-MCNC: NEGATIVE MG/DL
HCT VFR BLD AUTO: 43.5 % (ref 37.5–51)
HGB BLD-MCNC: 13.6 G/DL (ref 13–17.7)
HGB UR QL STRIP.AUTO: ABNORMAL
HYALINE CASTS UR QL AUTO: ABNORMAL /LPF
IMM GRANULOCYTES # BLD AUTO: 0.06 10*3/MM3 (ref 0–0.05)
IMM GRANULOCYTES NFR BLD AUTO: 0.5 % (ref 0–0.5)
KETONES UR QL STRIP: ABNORMAL
LEUKOCYTE ESTERASE UR QL STRIP.AUTO: ABNORMAL
LYMPHOCYTES # BLD AUTO: 1.18 10*3/MM3 (ref 0.7–3.1)
LYMPHOCYTES NFR BLD AUTO: 9 % (ref 19.6–45.3)
MCH RBC QN AUTO: 27.6 PG (ref 26.6–33)
MCHC RBC AUTO-ENTMCNC: 31.3 G/DL (ref 31.5–35.7)
MCV RBC AUTO: 88.2 FL (ref 79–97)
MONOCYTES # BLD AUTO: 1.13 10*3/MM3 (ref 0.1–0.9)
MONOCYTES NFR BLD AUTO: 8.6 % (ref 5–12)
NEUTROPHILS # BLD AUTO: 10.55 10*3/MM3 (ref 1.7–7)
NEUTROPHILS NFR BLD AUTO: 80.3 % (ref 42.7–76)
NITRITE UR QL STRIP: NEGATIVE
NRBC BLD AUTO-RTO: 0 /100 WBC (ref 0–0.2)
PH UR STRIP.AUTO: 6 [PH] (ref 5–8)
PLATELET # BLD AUTO: 471 10*3/MM3 (ref 140–450)
PMV BLD AUTO: 9.6 FL (ref 6–12)
POTASSIUM BLD-SCNC: 3 MMOL/L (ref 3.5–5.2)
PROT SERPL-MCNC: 6.9 G/DL (ref 6–8.5)
PROT UR QL STRIP: ABNORMAL
RBC # BLD AUTO: 4.93 10*6/MM3 (ref 4.14–5.8)
RBC # UR: ABNORMAL /HPF
REF LAB TEST METHOD: ABNORMAL
SODIUM BLD-SCNC: 143 MMOL/L (ref 136–145)
SP GR UR STRIP: 1.01 (ref 1–1.03)
SQUAMOUS #/AREA URNS HPF: ABNORMAL /HPF
TROPONIN T SERPL-MCNC: 0.04 NG/ML (ref 0–0.03)
UROBILINOGEN UR QL STRIP: ABNORMAL
WBC NRBC COR # BLD: 13.13 10*3/MM3 (ref 3.4–10.8)
WBC UR QL AUTO: ABNORMAL /HPF

## 2019-06-12 PROCEDURE — 83605 ASSAY OF LACTIC ACID: CPT | Performed by: EMERGENCY MEDICINE

## 2019-06-12 PROCEDURE — P9612 CATHETERIZE FOR URINE SPEC: HCPCS

## 2019-06-12 PROCEDURE — 99285 EMERGENCY DEPT VISIT HI MDM: CPT

## 2019-06-12 PROCEDURE — 36415 COLL VENOUS BLD VENIPUNCTURE: CPT

## 2019-06-12 PROCEDURE — 81001 URINALYSIS AUTO W/SCOPE: CPT | Performed by: EMERGENCY MEDICINE

## 2019-06-12 PROCEDURE — 82550 ASSAY OF CK (CPK): CPT | Performed by: EMERGENCY MEDICINE

## 2019-06-12 PROCEDURE — 85025 COMPLETE CBC W/AUTO DIFF WBC: CPT | Performed by: EMERGENCY MEDICINE

## 2019-06-12 PROCEDURE — 87186 SC STD MICRODIL/AGAR DIL: CPT | Performed by: EMERGENCY MEDICINE

## 2019-06-12 PROCEDURE — 25010000002 CEFTRIAXONE PER 250 MG: Performed by: EMERGENCY MEDICINE

## 2019-06-12 PROCEDURE — 80053 COMPREHEN METABOLIC PANEL: CPT | Performed by: EMERGENCY MEDICINE

## 2019-06-12 PROCEDURE — 87086 URINE CULTURE/COLONY COUNT: CPT | Performed by: EMERGENCY MEDICINE

## 2019-06-12 PROCEDURE — 93005 ELECTROCARDIOGRAM TRACING: CPT | Performed by: EMERGENCY MEDICINE

## 2019-06-12 PROCEDURE — 70450 CT HEAD/BRAIN W/O DYE: CPT

## 2019-06-12 PROCEDURE — 84484 ASSAY OF TROPONIN QUANT: CPT | Performed by: EMERGENCY MEDICINE

## 2019-06-12 PROCEDURE — 93010 ELECTROCARDIOGRAM REPORT: CPT | Performed by: INTERNAL MEDICINE

## 2019-06-12 PROCEDURE — 87040 BLOOD CULTURE FOR BACTERIA: CPT | Performed by: EMERGENCY MEDICINE

## 2019-06-12 PROCEDURE — 87077 CULTURE AEROBIC IDENTIFY: CPT | Performed by: EMERGENCY MEDICINE

## 2019-06-12 RX ORDER — LATANOPROST 50 UG/ML
1 SOLUTION/ DROPS OPHTHALMIC NIGHTLY
COMMUNITY

## 2019-06-12 RX ORDER — DEXTROSE MONOHYDRATE 25 G/50ML
25 INJECTION, SOLUTION INTRAVENOUS
Status: DISCONTINUED | OUTPATIENT
Start: 2019-06-12 | End: 2019-06-14 | Stop reason: HOSPADM

## 2019-06-12 RX ORDER — CEFTRIAXONE SODIUM 1 G/50ML
1 INJECTION, SOLUTION INTRAVENOUS ONCE
Status: COMPLETED | OUTPATIENT
Start: 2019-06-12 | End: 2019-06-12

## 2019-06-12 RX ORDER — ACETAMINOPHEN 325 MG/1
650 TABLET ORAL EVERY 4 HOURS PRN
Status: DISCONTINUED | OUTPATIENT
Start: 2019-06-12 | End: 2019-06-14 | Stop reason: HOSPADM

## 2019-06-12 RX ORDER — BRIMONIDINE TARTRATE 2 MG/ML
1 SOLUTION/ DROPS OPHTHALMIC 3 TIMES DAILY
COMMUNITY

## 2019-06-12 RX ORDER — ONDANSETRON 4 MG/1
4 TABLET, FILM COATED ORAL EVERY 6 HOURS PRN
Status: DISCONTINUED | OUTPATIENT
Start: 2019-06-12 | End: 2019-06-14 | Stop reason: HOSPADM

## 2019-06-12 RX ORDER — SODIUM CHLORIDE 9 MG/ML
125 INJECTION, SOLUTION INTRAVENOUS CONTINUOUS
Status: DISCONTINUED | OUTPATIENT
Start: 2019-06-12 | End: 2019-06-13

## 2019-06-12 RX ORDER — SODIUM CHLORIDE 0.9 % (FLUSH) 0.9 %
1-10 SYRINGE (ML) INJECTION AS NEEDED
Status: DISCONTINUED | OUTPATIENT
Start: 2019-06-12 | End: 2019-06-14 | Stop reason: HOSPADM

## 2019-06-12 RX ORDER — SODIUM CHLORIDE 0.9 % (FLUSH) 0.9 %
10 SYRINGE (ML) INJECTION AS NEEDED
Status: DISCONTINUED | OUTPATIENT
Start: 2019-06-12 | End: 2019-06-14 | Stop reason: HOSPADM

## 2019-06-12 RX ORDER — ONDANSETRON 2 MG/ML
4 INJECTION INTRAMUSCULAR; INTRAVENOUS EVERY 6 HOURS PRN
Status: DISCONTINUED | OUTPATIENT
Start: 2019-06-12 | End: 2019-06-14 | Stop reason: HOSPADM

## 2019-06-12 RX ORDER — NICOTINE POLACRILEX 4 MG
15 LOZENGE BUCCAL
Status: DISCONTINUED | OUTPATIENT
Start: 2019-06-12 | End: 2019-06-14 | Stop reason: HOSPADM

## 2019-06-12 RX ORDER — POTASSIUM CHLORIDE 750 MG/1
40 CAPSULE, EXTENDED RELEASE ORAL ONCE
Status: COMPLETED | OUTPATIENT
Start: 2019-06-12 | End: 2019-06-12

## 2019-06-12 RX ORDER — CEFTRIAXONE SODIUM 1 G/50ML
1 INJECTION, SOLUTION INTRAVENOUS EVERY 24 HOURS
Status: DISCONTINUED | OUTPATIENT
Start: 2019-06-13 | End: 2019-06-14 | Stop reason: HOSPADM

## 2019-06-12 RX ORDER — SODIUM CHLORIDE 0.9 % (FLUSH) 0.9 %
3 SYRINGE (ML) INJECTION EVERY 12 HOURS SCHEDULED
Status: DISCONTINUED | OUTPATIENT
Start: 2019-06-12 | End: 2019-06-14 | Stop reason: HOSPADM

## 2019-06-12 RX ORDER — SODIUM CHLORIDE 9 MG/ML
125 INJECTION, SOLUTION INTRAVENOUS CONTINUOUS
Status: DISCONTINUED | OUTPATIENT
Start: 2019-06-12 | End: 2019-06-12

## 2019-06-12 RX ADMIN — SODIUM CHLORIDE 125 ML/HR: 9 INJECTION, SOLUTION INTRAVENOUS at 21:15

## 2019-06-12 RX ADMIN — CEFTRIAXONE SODIUM 1 G: 1 INJECTION, SOLUTION INTRAVENOUS at 21:27

## 2019-06-12 RX ADMIN — SODIUM CHLORIDE 500 ML: 9 INJECTION, SOLUTION INTRAVENOUS at 20:30

## 2019-06-12 RX ADMIN — POTASSIUM CHLORIDE 40 MEQ: 750 CAPSULE, EXTENDED RELEASE ORAL at 22:36

## 2019-06-12 NOTE — ED TRIAGE NOTES
Pt to ED with reports of dizziness and difficulty urinating.  Daughter reports home health nurse recommended they come in for possibly dehydration.  Pt was admitted about a month ago for pyelonephritis, but refused to take ABT once he got home.

## 2019-06-13 ENCOUNTER — APPOINTMENT (OUTPATIENT)
Dept: CT IMAGING | Facility: HOSPITAL | Age: 79
End: 2019-06-13

## 2019-06-13 PROBLEM — R77.8 ELEVATED TROPONIN: Status: ACTIVE | Noted: 2019-06-13

## 2019-06-13 PROBLEM — I49.8 WANDERING ATRIAL PACEMAKER: Status: ACTIVE | Noted: 2019-06-13

## 2019-06-13 PROBLEM — N10 ACUTE PYELONEPHRITIS: Status: RESOLVED | Noted: 2019-05-13 | Resolved: 2019-06-13

## 2019-06-13 PROBLEM — E87.6 HYPOKALEMIA: Status: RESOLVED | Noted: 2019-05-13 | Resolved: 2019-06-13

## 2019-06-13 PROBLEM — R94.31 ABNORMAL EKG: Status: ACTIVE | Noted: 2019-06-13

## 2019-06-13 LAB
ANION GAP SERPL CALCULATED.3IONS-SCNC: 11.6 MMOL/L
BUN BLD-MCNC: 15 MG/DL (ref 8–23)
BUN/CREAT SERPL: 18.5 (ref 7–25)
CALCIUM SPEC-SCNC: 10 MG/DL (ref 8.6–10.5)
CHLORIDE SERPL-SCNC: 101 MMOL/L (ref 98–107)
CK MB SERPL-CCNC: 1.38 NG/ML
CK SERPL-CCNC: 20 U/L (ref 20–200)
CO2 SERPL-SCNC: 32.4 MMOL/L (ref 22–29)
CREAT BLD-MCNC: 0.81 MG/DL (ref 0.76–1.27)
DEPRECATED RDW RBC AUTO: 41.1 FL (ref 37–54)
ERYTHROCYTE [DISTWIDTH] IN BLOOD BY AUTOMATED COUNT: 12.8 % (ref 12.3–15.4)
GFR SERPL CREATININE-BSD FRML MDRD: 112 ML/MIN/1.73
GFR SERPL CREATININE-BSD FRML MDRD: 92 ML/MIN/1.73
GLUCOSE BLD-MCNC: 144 MG/DL (ref 65–99)
GLUCOSE BLDC GLUCOMTR-MCNC: 133 MG/DL (ref 70–130)
GLUCOSE BLDC GLUCOMTR-MCNC: 140 MG/DL (ref 70–130)
GLUCOSE BLDC GLUCOMTR-MCNC: 147 MG/DL (ref 70–130)
GLUCOSE BLDC GLUCOMTR-MCNC: 154 MG/DL (ref 70–130)
HBA1C MFR BLD: 6.8 % (ref 4.8–5.6)
HCT VFR BLD AUTO: 39.8 % (ref 37.5–51)
HGB BLD-MCNC: 12.7 G/DL (ref 13–17.7)
MCH RBC QN AUTO: 28.2 PG (ref 26.6–33)
MCHC RBC AUTO-ENTMCNC: 31.9 G/DL (ref 31.5–35.7)
MCV RBC AUTO: 88.2 FL (ref 79–97)
PLATELET # BLD AUTO: 408 10*3/MM3 (ref 140–450)
PMV BLD AUTO: 9.5 FL (ref 6–12)
POTASSIUM BLD-SCNC: 3.1 MMOL/L (ref 3.5–5.2)
RBC # BLD AUTO: 4.51 10*6/MM3 (ref 4.14–5.8)
SODIUM BLD-SCNC: 145 MMOL/L (ref 136–145)
TROPONIN T SERPL-MCNC: 0.03 NG/ML (ref 0–0.03)
WBC NRBC COR # BLD: 12.86 10*3/MM3 (ref 3.4–10.8)

## 2019-06-13 PROCEDURE — 74177 CT ABD & PELVIS W/CONTRAST: CPT

## 2019-06-13 PROCEDURE — 80048 BASIC METABOLIC PNL TOTAL CA: CPT | Performed by: NURSE PRACTITIONER

## 2019-06-13 PROCEDURE — 82962 GLUCOSE BLOOD TEST: CPT

## 2019-06-13 PROCEDURE — 63710000001 INSULIN LISPRO (HUMAN) PER 5 UNITS: Performed by: NURSE PRACTITIONER

## 2019-06-13 PROCEDURE — 85027 COMPLETE CBC AUTOMATED: CPT | Performed by: NURSE PRACTITIONER

## 2019-06-13 PROCEDURE — 84484 ASSAY OF TROPONIN QUANT: CPT | Performed by: INTERNAL MEDICINE

## 2019-06-13 PROCEDURE — 25010000002 ENOXAPARIN PER 10 MG: Performed by: HOSPITALIST

## 2019-06-13 PROCEDURE — 82550 ASSAY OF CK (CPK): CPT | Performed by: INTERNAL MEDICINE

## 2019-06-13 PROCEDURE — 25010000002 IOPAMIDOL 61 % SOLUTION: Performed by: HOSPITALIST

## 2019-06-13 PROCEDURE — 83036 HEMOGLOBIN GLYCOSYLATED A1C: CPT | Performed by: NURSE PRACTITIONER

## 2019-06-13 PROCEDURE — 99222 1ST HOSP IP/OBS MODERATE 55: CPT | Performed by: INTERNAL MEDICINE

## 2019-06-13 PROCEDURE — 82553 CREATINE MB FRACTION: CPT | Performed by: INTERNAL MEDICINE

## 2019-06-13 RX ORDER — LATANOPROST 50 UG/ML
1 SOLUTION/ DROPS OPHTHALMIC NIGHTLY
Status: DISCONTINUED | OUTPATIENT
Start: 2019-06-13 | End: 2019-06-14 | Stop reason: HOSPADM

## 2019-06-13 RX ORDER — SODIUM CHLORIDE 9 MG/ML
100 INJECTION, SOLUTION INTRAVENOUS CONTINUOUS
Status: ACTIVE | OUTPATIENT
Start: 2019-06-13 | End: 2019-06-13

## 2019-06-13 RX ORDER — BRIMONIDINE TARTRATE 2 MG/ML
1 SOLUTION/ DROPS OPHTHALMIC 3 TIMES DAILY
Status: DISCONTINUED | OUTPATIENT
Start: 2019-06-13 | End: 2019-06-14 | Stop reason: HOSPADM

## 2019-06-13 RX ORDER — ASPIRIN 81 MG/1
81 TABLET ORAL DAILY
Status: DISCONTINUED | OUTPATIENT
Start: 2019-06-13 | End: 2019-06-14 | Stop reason: HOSPADM

## 2019-06-13 RX ORDER — TAMSULOSIN HYDROCHLORIDE 0.4 MG/1
0.4 CAPSULE ORAL NIGHTLY
Status: DISCONTINUED | OUTPATIENT
Start: 2019-06-13 | End: 2019-06-14 | Stop reason: HOSPADM

## 2019-06-13 RX ORDER — AMLODIPINE BESYLATE 5 MG/1
5 TABLET ORAL
Status: DISCONTINUED | OUTPATIENT
Start: 2019-06-13 | End: 2019-06-14 | Stop reason: HOSPADM

## 2019-06-13 RX ORDER — LISINOPRIL 10 MG/1
10 TABLET ORAL DAILY
Status: DISCONTINUED | OUTPATIENT
Start: 2019-06-13 | End: 2019-06-14 | Stop reason: HOSPADM

## 2019-06-13 RX ORDER — FAMOTIDINE 10 MG/ML
20 INJECTION, SOLUTION INTRAVENOUS EVERY 12 HOURS SCHEDULED
Status: DISCONTINUED | OUTPATIENT
Start: 2019-06-13 | End: 2019-06-14 | Stop reason: HOSPADM

## 2019-06-13 RX ORDER — POTASSIUM CHLORIDE 750 MG/1
40 CAPSULE, EXTENDED RELEASE ORAL
Status: COMPLETED | OUTPATIENT
Start: 2019-06-13 | End: 2019-06-14

## 2019-06-13 RX ORDER — GLIPIZIDE 10 MG/1
10 TABLET ORAL
Status: DISCONTINUED | OUTPATIENT
Start: 2019-06-13 | End: 2019-06-14 | Stop reason: HOSPADM

## 2019-06-13 RX ORDER — HYDROCODONE BITARTRATE AND ACETAMINOPHEN 5; 325 MG/1; MG/1
1 TABLET ORAL EVERY 6 HOURS PRN
Status: DISCONTINUED | OUTPATIENT
Start: 2019-06-13 | End: 2019-06-14 | Stop reason: HOSPADM

## 2019-06-13 RX ADMIN — LATANOPROST 1 DROP: 50 SOLUTION OPHTHALMIC at 20:57

## 2019-06-13 RX ADMIN — POTASSIUM CHLORIDE 40 MEQ: 750 CAPSULE, EXTENDED RELEASE ORAL at 12:57

## 2019-06-13 RX ADMIN — FAMOTIDINE 20 MG: 10 INJECTION INTRAVENOUS at 20:58

## 2019-06-13 RX ADMIN — BRIMONIDINE TARTRATE 1 DROP: 2 SOLUTION OPHTHALMIC at 17:45

## 2019-06-13 RX ADMIN — SODIUM CHLORIDE 100 ML/HR: 9 INJECTION, SOLUTION INTRAVENOUS at 10:03

## 2019-06-13 RX ADMIN — TAMSULOSIN HYDROCHLORIDE 0.4 MG: 0.4 CAPSULE ORAL at 20:58

## 2019-06-13 RX ADMIN — SODIUM CHLORIDE 125 ML/HR: 9 INJECTION, SOLUTION INTRAVENOUS at 06:41

## 2019-06-13 RX ADMIN — INSULIN LISPRO 2 UNITS: 100 INJECTION, SOLUTION INTRAVENOUS; SUBCUTANEOUS at 12:56

## 2019-06-13 RX ADMIN — GLIPIZIDE 10 MG: 10 TABLET ORAL at 17:45

## 2019-06-13 RX ADMIN — IOPAMIDOL 85 ML: 612 INJECTION, SOLUTION INTRAVENOUS at 12:35

## 2019-06-13 RX ADMIN — SODIUM CHLORIDE 100 ML/HR: 9 INJECTION, SOLUTION INTRAVENOUS at 17:45

## 2019-06-13 RX ADMIN — AMLODIPINE BESYLATE 5 MG: 5 TABLET ORAL at 12:56

## 2019-06-13 RX ADMIN — FAMOTIDINE 20 MG: 10 INJECTION INTRAVENOUS at 12:56

## 2019-06-13 RX ADMIN — POTASSIUM CHLORIDE 40 MEQ: 750 CAPSULE, EXTENDED RELEASE ORAL at 17:45

## 2019-06-13 RX ADMIN — BRIMONIDINE TARTRATE 1 DROP: 2 SOLUTION OPHTHALMIC at 12:56

## 2019-06-13 RX ADMIN — SODIUM CHLORIDE, PRESERVATIVE FREE 3 ML: 5 INJECTION INTRAVENOUS at 06:43

## 2019-06-13 RX ADMIN — BRIMONIDINE TARTRATE 1 DROP: 2 SOLUTION OPHTHALMIC at 20:57

## 2019-06-13 RX ADMIN — ENOXAPARIN SODIUM 40 MG: 40 INJECTION SUBCUTANEOUS at 10:06

## 2019-06-13 RX ADMIN — ASPIRIN 81 MG: 81 TABLET, COATED ORAL at 12:56

## 2019-06-13 RX ADMIN — SODIUM CHLORIDE, PRESERVATIVE FREE 3 ML: 5 INJECTION INTRAVENOUS at 10:04

## 2019-06-13 RX ADMIN — LISINOPRIL 10 MG: 10 TABLET ORAL at 12:56

## 2019-06-13 NOTE — CONSULTS
Date of Hospital Visit: 19  Encounter Provider: Xander Sims RN  Place of Service: [facility] CARDIOLOGY  Patient Name: Raymon Solis  :1940  Referral Provider: Dr. Fernandez    Chief complaint: weakness    Reason for consult: Elevated Troponin    History of Present Illness:     Mr Solis is a 79yo man with hypertension and diabetes.  He is a really, really difficult historian.  His speech is unintelligible at times and he gives tangential answers and contradicts himself.  He denies any history of heart problems.    He was here from May 13-15 with E coli pyelonephritis.  He was to take oral antibiotics at discharge but did not.  He was brought to the ED by family members due to generalized weakness.  He was found to have a UTI.    A troponin was checked, presumably for his complaint of weakness; he denies any chest pain, dyspnea, orthopnea, palpitations, lightheadedness, or syncope.      An EKG showed a wandering atrial pacemaker and nondiagnostic inferior Q waves.  Tele today shows NSR.    An echo was performed in May 2019 due to a murmur noticed on exam -- it showed normal LV size/systolic function and moderate AS.    He is asking to go home.      Cardiac Testing:   Echo 2019  · Left ventricular diastolic dysfunction (grade I) consistent with impaired relaxation.  · Estimated EF = 73%.  · Left ventricular systolic function is hyperdynamic (EF > 70).  · Left atrial cavity size is mildly dilated.  · Moderate aortic valve stenosis is present.  · Aortic valve maximum pressure gradient is 51.0 mmHg.  · Aortic valve mean pressure gradient is 28.0 mmHg.  · Aortic valve area is 1.0 cm2.  · There is moderate calcification of the aortic valve.      Past Medical History:   Diagnosis Date   • Diabetes mellitus (CMS/[facility])    • Hypertension        Past Surgical History:   Procedure Laterality Date   • CATARACT EXTRACTION, BILATERAL         Prior to Admission medications    Medication  Sig Start Date End Date Taking? Authorizing Provider   amLODIPine (NORVASC) 5 MG tablet Take 1 tablet by mouth Daily. 5/16/19  Yes Vishnu Nash MD   aspirin 81 MG EC tablet Take 81 mg by mouth Daily.   Yes Khris Figueroa MD   brimonidine (ALPHAGAN) 0.2 % ophthalmic solution Administer 1 drop to both eyes 3 (Three) Times a Day.   Yes Khris Figueroa MD   furosemide (LASIX) 40 MG tablet Take 0.5 tablets by mouth Daily. 5/15/19  Yes Vishnu Nash MD   glipiZIDE (GLUCOTROL) 10 MG tablet Take 10 mg by mouth 2 (Two) Times a Day Before Meals.   Yes Khris Figueroa MD   latanoprost (XALATAN) 0.005 % ophthalmic solution Administer 1 drop to both eyes Every Night.   Yes Khris Figueroa MD   lisinopril (PRINIVIL,ZESTRIL) 10 MG tablet Take 1 tablet by mouth Daily. 5/15/19  Yes Vishnu Nash MD   metFORMIN (GLUCOPHAGE) 1000 MG tablet Take 1,000 mg by mouth 2 (Two) Times a Day With Meals.   Yes Khris Figueroa MD   potassium chloride (K-DUR) 10 MEQ CR tablet Take 1 tablet by mouth Daily. 5/15/19  Yes Vishnu Nash MD   tamsulosin (FLOMAX) 0.4 MG capsule 24 hr capsule Take 1 capsule by mouth Every Night. 5/15/19  Yes Vishnu Nash MD       Social History     Socioeconomic History   • Marital status:      Spouse name: Not on file   • Number of children: Not on file   • Years of education: Not on file   • Highest education level: Not on file   Tobacco Use   • Smoking status: Never Smoker   • Smokeless tobacco: Never Used   Substance and Sexual Activity   • Alcohol use: No     Frequency: Never   • Drug use: No   • Sexual activity: Defer       History reviewed. No pertinent family history.    Review of Systems   Reason unable to perform ROS: Very poor historian.   Respiratory: Negative for shortness of breath.    Cardiovascular: Negative for chest pain, palpitations and leg swelling.   Neurological: Negative for syncope.        Objective:     Vitals:    06/12/19  "2229 06/12/19 2235 06/12/19 2308 06/13/19 0748   BP:  (!) 186/98 180/76 176/90   BP Location:   Left arm Left arm   Patient Position:   Lying Lying   Pulse: 75 87 90 93   Resp: 16 16 16 18   Temp:   98.3 °F (36.8 °C) 98.8 °F (37.1 °C)   TempSrc:   Oral Oral   SpO2: 97% 97%  95%   Weight:   69.7 kg (153 lb 9.6 oz)    Height:   160 cm (63\")      Body mass index is 27.21 kg/m².  Flowsheet Rows      First Filed Value   Admission Height  165.1 cm (65\") Documented at 06/12/2019 1948   Admission Weight  70.7 kg (155 lb 12.8 oz) Documented at 06/12/2019 2003          Physical Exam   Constitutional: He has a sickly appearance.   HENT:   Head: Normocephalic.   Nose: Nose normal.   Mouth/Throat: Oropharynx is clear and moist.   Eyes: Conjunctivae and EOM are normal. Pupils are equal, round, and reactive to light.   Neck: Normal range of motion. No JVD present.   Cardiovascular: Normal rate, regular rhythm and intact distal pulses.   Murmur heard.   Systolic murmur is present with a grade of 2/6.  Pulmonary/Chest: Effort normal and breath sounds normal.   Abdominal: Soft. There is no tenderness.   Musculoskeletal: Normal range of motion. He exhibits no edema.   Neurological: He is alert.   Skin: Skin is warm and dry. No erythema.   Psychiatric: He has a normal mood and affect.   Vitals reviewed.              Lab Review:                Results from last 7 days   Lab Units 06/13/19  0613   SODIUM mmol/L 145   POTASSIUM mmol/L 3.1*   CHLORIDE mmol/L 101   CO2 mmol/L 32.4*   BUN mg/dL 15   CREATININE mg/dL 0.81   GLUCOSE mg/dL 144*   CALCIUM mg/dL 10.0     Results from last 7 days   Lab Units 06/12/19 2021   CK TOTAL U/L 18*   TROPONIN T ng/mL 0.044*     Results from last 7 days   Lab Units 06/13/19  0613   WBC 10*3/mm3 12.86*   HEMOGLOBIN g/dL 12.7*   HEMATOCRIT % 39.8   PLATELETS 10*3/mm3 408                       EKG          I personally viewed and interpreted the patient's EKG/Telemetry data -- NSR, nondiagnostic inferior Q " waves    Assessment/Plan:     1.  Elevated Tn -- indeterminate Tn without any cardiac symptoms and no concerning findings on EKG.  I will check a CK/MB.  I don't feel an ischemic evaluation will be needed in the absence of any symptoms, and as there is considerable concern about compliance with medications and follow up.  Given his underlying DM, aspirin/statin/ACE therapy is indicated.  His BP is so high and I suspect he'll tolerate a beta blocker, although I wouldn't start this unless his MB-index is elevated.    2.  Abnormal EKG -- nondiagnostic inferior Q waves with normal inferior wall motion on TTE.     3.  Wandering atrial pacemaker -- now in NSR.  No afib or tachycardia noted.    4.  HTN -- poorly controlled, consider increasing lisinopril or amlodipine, will defer to primary team    5.  DM2    6.  UTI    ADD;    CK is 18.  Tn is downtrending.  I would not recommend further evaluation at this time.  I do want him to follow up in the office in one month.

## 2019-06-13 NOTE — PROGRESS NOTES
Malnutrition Severity Assessment    Patient Name:  Raymon Solis  YOB: 1940  MRN: [id number]  Admit Date:  6/12/2019    Patient meets criteria for : Severe Malnutrition      Malnutrition Severity Assessment  Malnutrition Type: Starvation - Related Malnutrition     Malnutrition Type (last 8 hours)      Malnutrition Severity Assessment     Row Name 06/13/19 1132       Malnutrition Severity Assessment    Malnutrition Type  Starvation - Related Malnutrition    Row Name 06/13/19 1132       Unintentional Weight Loss     Unintentional Weight Loss   Weight loss greater than 5% in one month 15% weight loss over the past month    Row Name 06/13/19 1132       Muscle Loss    Loss of Muscle Mass Findings  Severe    [place] Region  Severe - deep hollowing/scooping, lack of muscle to touch, facial bones well defined    Clavicle Bone Region  Severe - protruding prominent bone    Acromion Bone Region  Severe - squared shoulders, bones, and acromion process protrusion prominent    Row Name 06/13/19 1132       Fat Loss    Subcutaneous Fat Loss Findings  Moderate    Orbital Region   Moderate -  somewhat hollowness, slightly dark circles    Row Name 06/13/19 1132       Criteria Met (Must meet criteria for severity in at least 2 of these categories: M Wasting, Fat Loss, Fluid, Secondary Signs, Wt. Status, Intake)    Patient meets criteria for   Severe Malnutrition          Electronically signed by:  Yajaira Alfaro RD  06/13/19 11:35 AM

## 2019-06-13 NOTE — PROGRESS NOTES
Continued Stay Note  [facility]     Patient Name: Raymon Solis  MRN: [id number]  Today's Date: 6/13/2019    Admit Date: 6/12/2019    Discharge Plan     Row Name 06/13/19 1128       Plan    Plan Comments  Per CCP management request, spoke with Dr. Fernandez to discuss/coordinate patient transfer due to patients insurance being out of [facility] network. Per Dr. Fernandez, patient transfer should have been initiated in ER prior to admission; therefore he has refused to transfer to different facility. CCP management informed. CHANCE Leal        Discharge Codes    No documentation.             Layla Perera RN

## 2019-06-13 NOTE — ED PROVIDER NOTES
EMERGENCY DEPARTMENT ENCOUNTER    Room Number:  21/21  Date of encounter:  6/12/2019  PCP: Magali Burgess MD  Historian: Patient and daughter      HPI:  Chief Complaint: Dysuria  Patient speaks limited English but daughter translates for him    Context: Raymon Solis is a 78 y.o. male who presents to the ED c/o dysuria for the last couple of weeks.  Daughter reports that when patient was discharged from the hospital May 15 after an admission for pyelonephritis he did not complete his course of antibiotics at home because he did not feel like it.  He has not been eating or drinking well and has been complaining of discomfort with urination.  He has had some generalized weakness and a few days ago family came over and found him on the floor.  He reported that he fell due to weakness.  Unsure if he had a head injury at that time, but has been complaining of a headache for the last few days.  Headache is generalized and worsened by light.  He denies actually having a headache at this moment.  Denies any fever, vomiting, abdominal pain, or flank pain.        PAST MEDICAL HISTORY  Active Ambulatory Problems     Diagnosis Date Noted   • Acute pyelonephritis 05/13/2019   • HTN (hypertension) 05/13/2019   • DM2 (diabetes mellitus, type 2) (CMS/HCC) 05/13/2019   • Hypokalemia 05/13/2019   • Elevated alkaline phosphatase level 05/14/2019   • Aortic stenosis 05/15/2019     Resolved Ambulatory Problems     Diagnosis Date Noted   • No Resolved Ambulatory Problems     Past Medical History:   Diagnosis Date   • Diabetes mellitus (CMS/HCC)    • Hypertension          PAST SURGICAL HISTORY  Past Surgical History:   Procedure Laterality Date   • NO PAST SURGERIES           FAMILY HISTORY  History reviewed. No pertinent family history.      SOCIAL HISTORY  Social History     Socioeconomic History   • Marital status:      Spouse name: Not on file   • Number of children: Not on file   • Years of education: Not on file   •  Highest education level: Not on file   Tobacco Use   • Smoking status: Never Smoker   Substance and Sexual Activity   • Alcohol use: No     Frequency: Never         ALLERGIES  Patient has no known allergies.        REVIEW OF SYSTEMS  Review of Systems   Constitutional: Positive for appetite change and fatigue. Negative for activity change and fever.   HENT: Negative for congestion and sore throat.    Eyes: Negative.    Respiratory: Negative for cough and shortness of breath.    Cardiovascular: Negative for chest pain and leg swelling.   Gastrointestinal: Negative for abdominal pain, diarrhea and vomiting.   Endocrine: Negative.    Genitourinary: Positive for difficulty urinating, dysuria, frequency and urgency. Negative for decreased urine volume and flank pain.   Musculoskeletal: Negative for neck pain.   Skin: Negative for rash and wound.   Allergic/Immunologic: Negative.    Neurological: Positive for weakness and headaches. Negative for numbness.   Hematological: Negative.    Psychiatric/Behavioral: Negative.    All other systems reviewed and are negative.           PHYSICAL EXAM    I have reviewed the triage vital signs and nursing notes.    GENERAL: 78-year-old  male well developed, well nourished in no apparent distress  HENT: NCAT, neck supple, trachea midline, mucous membranes are dry  EYES: no scleral icterus, PERRL, normal conjunctiva  CV: regular rhythm, regular rate occasional extrasystoles, loud systolic murmur  RESPIRATORY: unlabored effort, CTAB  ABDOMEN: soft, mild suprapubic tenderness non-distended  MUSCULOSKELETAL: no gross deformity, no edema  NEURO: alert, CN II-XII grossly intact, sensory and motor function of extremities grossly intact.  SKIN: Poor skin turgor, no rash  PSYCH:  Appropriate mood and affect    Vital signs and nursing notes reviewed.          LAB RESULTS  Recent Results (from the past 24 hour(s))   Comprehensive Metabolic Panel    Collection Time: 06/12/19  8:21 PM    Result Value Ref Range    Glucose 182 (H) 65 - 99 mg/dL    BUN 17 8 - 23 mg/dL    Creatinine 1.02 0.76 - 1.27 mg/dL    Sodium 143 136 - 145 mmol/L    Potassium 3.0 (L) 3.5 - 5.2 mmol/L    Chloride 94 (L) 98 - 107 mmol/L    CO2 32.4 (H) 22.0 - 29.0 mmol/L    Calcium 10.4 8.6 - 10.5 mg/dL    Total Protein 6.9 6.0 - 8.5 g/dL    Albumin 3.10 (L) 3.50 - 5.20 g/dL    ALT (SGPT) 20 1 - 41 U/L    AST (SGOT) 23 1 - 40 U/L    Alkaline Phosphatase 212 (H) 39 - 117 U/L    Total Bilirubin 1.1 0.2 - 1.2 mg/dL    eGFR Non African Amer 71 >60 mL/min/1.73    eGFR  African Amer 86 >60 mL/min/1.73    Globulin 3.8 gm/dL    A/G Ratio 0.8 g/dL    BUN/Creatinine Ratio 16.7 7.0 - 25.0    Anion Gap 16.6 mmol/L   Urinalysis With Microscopic If Indicated (No Culture) - Urine, Catheter    Collection Time: 06/12/19  8:21 PM   Result Value Ref Range    Color, UA Dark Yellow (A) Yellow, Straw    Appearance, UA Turbid (A) Clear    pH, UA 6.0 5.0 - 8.0    Specific Gravity, UA 1.014 1.005 - 1.030    Glucose, UA Negative Negative    Ketones, UA 15 mg/dL (1+) (A) Negative    Bilirubin, UA Negative Negative    Blood, UA Moderate (2+) (A) Negative    Protein,  mg/dL (2+) (A) Negative    Leuk Esterase, UA Large (3+) (A) Negative    Nitrite, UA Negative Negative    Urobilinogen, UA 1.0 E.U./dL 0.2 - 1.0 E.U./dL   Troponin    Collection Time: 06/12/19  8:21 PM   Result Value Ref Range    Troponin T 0.044 (C) 0.000 - 0.030 ng/mL   CK    Collection Time: 06/12/19  8:21 PM   Result Value Ref Range    Creatine Kinase 18 (L) 20 - 200 U/L   CBC Auto Differential    Collection Time: 06/12/19  8:21 PM   Result Value Ref Range    WBC 13.13 (H) 3.40 - 10.80 10*3/mm3    RBC 4.93 4.14 - 5.80 10*6/mm3    Hemoglobin 13.6 13.0 - 17.7 g/dL    Hematocrit 43.5 37.5 - 51.0 %    MCV 88.2 79.0 - 97.0 fL    MCH 27.6 26.6 - 33.0 pg    MCHC 31.3 (L) 31.5 - 35.7 g/dL    RDW 13.0 12.3 - 15.4 %    RDW-SD 41.7 37.0 - 54.0 fl    MPV 9.6 6.0 - 12.0 fL    Platelets 471 (H) 140 -  450 10*3/mm3    Neutrophil % 80.3 (H) 42.7 - 76.0 %    Lymphocyte % 9.0 (L) 19.6 - 45.3 %    Monocyte % 8.6 5.0 - 12.0 %    Eosinophil % 1.0 0.3 - 6.2 %    Basophil % 0.6 0.0 - 1.5 %    Immature Grans % 0.5 0.0 - 0.5 %    Neutrophils, Absolute 10.55 (H) 1.70 - 7.00 10*3/mm3    Lymphocytes, Absolute 1.18 0.70 - 3.10 10*3/mm3    Monocytes, Absolute 1.13 (H) 0.10 - 0.90 10*3/mm3    Eosinophils, Absolute 0.13 0.00 - 0.40 10*3/mm3    Basophils, Absolute 0.08 0.00 - 0.20 10*3/mm3    Immature Grans, Absolute 0.06 (H) 0.00 - 0.05 10*3/mm3    nRBC 0.0 0.0 - 0.2 /100 WBC   Urinalysis, Microscopic Only - Urine, Catheter    Collection Time: 06/12/19  8:21 PM   Result Value Ref Range    RBC, UA 13-20 (A) None Seen, 0-2 /HPF    WBC, UA Too Numerous to Count (A) None Seen, 0-2 /HPF    Bacteria, UA 4+ (A) None Seen /HPF    Squamous Epithelial Cells, UA 0-2 None Seen, 0-2 /HPF    Hyaline Casts, UA 7-12 None Seen /LPF    Methodology Automated Microscopy    Lactic Acid, Plasma    Collection Time: 06/12/19  9:22 PM   Result Value Ref Range    Lactate 1.4 0.5 - 2.0 mmol/L       Ordered the above labs and reviewed the results.        RADIOLOGY  Ct Head Without Contrast    Result Date: 6/12/2019  EXAMINATION: CT OF THE HEAD WITHOUT CONTRAST  HISTORY: 78-year-old male status post fall with weakness and headache. The patient was found on the floor.  TECHNIQUE: Contiguous axial images were obtained through the head without IV contrast.  COMPARISON: None  FINDINGS: There is no evidence for intracranial hemorrhage. Mild diffuse cortical atrophy is noted. There is no midline shift or abnormal extra-axial fluid collection. The visualized paranasal sinuses and mastoids are clear. The osseous structures of the skull have a normal appearance.      There is no evidence for an active or acute intracranial abnormality. Mild diffuse cerebral atrophy is noted.  Radiation dose reduction techniques were utilized, including automated exposure control and  exposure modulation based on body size.  This report was finalized on 6/12/2019 9:00 PM by Dr. Dc Fong M.D.        I ordered the above noted radiological studies. Interpreted by radiologist. Reviewed by me in PACS.    PROCEDURES  Procedures    EKG:           EKG time: 2029  Rhythm/Rate: Wandering atrial pacemaker, rate 81  P waves and CO: Normal  QRS, axis: Normal  ST and T waves: Nonspecific ST T wave changes    Interpreted Contemporaneously by me, independently viewed  No old EKG for comparison        MEDICATIONS GIVEN IN ER  Medications   sodium chloride 0.9 % flush 10 mL (not administered)   cefTRIAXone (ROCEPHIN) IVPB 1 g (1 g Intravenous New Bag 6/12/19 2127)   sodium chloride 0.9 % infusion (125 mL/hr Intravenous New Bag 6/12/19 2115)   sodium chloride 0.9 % infusion (not administered)   potassium chloride (MICRO-K) CR capsule 40 mEq (not administered)   sodium chloride 0.9 % bolus 500 mL (0 mL Intravenous Stopped 6/12/19 2115)       PROGRESS AND CONSULTS  ED Course as of Jun 12 2235 Wed Jun 12, 2019 2142 Patient has obvious UTI.  Based on last culture results will go ahead and start with Rocephin.  Also given some IV fluids.  Elevated troponin is also noted but his EKG shows no acute ischemic changes and he denies any chest pain.  Will admit to A for treatment.  Lactate and blood cultures pending.  [EB]   2144 Oral potassium replacement ordered Potassium: (!) 3.0 [EB]   2205 Discussed with [facility] for A, and they will admit.  [EB]   2206 Discussed results of testing with patient and family and explained need for admission for IV antibiotics and further evaluation.  Answered all questions and they agree with the plan.  [EB]   2206 I was informed of an issue with the patient's insurance by [facility] and that transfer may be necessary.  However, with his elevated troponin and acute infection I do not feel is appropriate to transfer the patient and he will need to be admitted here.  [EB]      ED  Course User Index  [EB] Dariel Licea MD           MEDICAL DECISION MAKING  [facility]     Old chart reviewed.  Patient was admitted in mid May of this year for acute pyelonephritis.  Was discharged on p.o. Keflex for 1 week.  Was also to follow-up with urology as an outpatient due to an enlarged prostate on CT scan.      DIAGNOSIS  Final diagnoses:   Acute UTI   Generalized weakness   Generalized headache   Hypokalemia   Elevated troponin         DISPOSITION  ADMISSION    Discussed treatment plan and reason for admission with pt/family and admitting physician.  Pt/family voiced understanding of the plan for admission for further testing/treatment as needed.                 --  No scribe       Dariel Licea MD  06/12/19 8968

## 2019-06-13 NOTE — DISCHARGE PLACEMENT REQUEST
"Salbador Solis (78 y.o. Male)     Date of Birth Social Security Number Address Home Phone MRN    1940  [id number] [address]  [id number]    Christian Marital Status          Pentecostal        Admission Date Admission Type Admitting Provider Attending Provider Department, Room/Bed    6/12/19 Emergency Aidan Fernandez MD Masden, Troy Andrew, MD [address], [address]    Discharge Date Discharge Disposition Discharge Destination                       Attending Provider:  Aidan Fernandez MD    Allergies:  No Known Allergies    Isolation:  None   Infection:  None   Code Status:  CPR    Ht:  160 cm (63\")   Wt:  69.7 kg (153 lb 9.6 oz)    Admission Cmt:  None   Principal Problem:  Acute UTI [N39.0]                 Active Insurance as of 6/12/2019     Primary Coverage     Payor Plan Insurance Group Employer/Plan Group    [employer] MEDICARE REPLACEMENT [employer] COMM[employer] HMO SNP NON PAR [id number]     Payor Plan Address Payor Plan Phone Number Payor Plan Fax Number Effective Dates       1/1/2018 - None Entered    Subscriber Name Subscriber Birth Date Member ID       SALBADOR SOLIS 1940 [id number]                 Emergency Contacts      (Rel.) Home Phone Work Phone Mobile Phone    MARTIN GOLDEN (Daughter) -- -- [phone]              "

## 2019-06-13 NOTE — H&P
HISTORY AND PHYSICAL   [facility]        Patient Identification:  Name: Raymon Solis  Age: 78 y.o.  Sex: male  :  1940  MRN: [id number]                     Primary Care Physician: Magali Burgess MD    Chief Complaint: Burning urination    History of Present Illness:   Mr Solis is a 78-year-old male who was discharged from this facility on 5/15/2019 the diagnosis of pyelonephritis.  He was prescribed Keflex 500 mg 3 times daily to take for another week post discharge.  Fortunately patient was not compliant with that regimen and stopped taking the antibiotics after couple days.  He did not consult his PCP or any physician in regards to this.  He states he just did not like the way it made him feel.  Try to get him to further clarify this I believe it involved nausea without emesis and some GI discomfort and loose stool with generalized weakness.  Apparently has had a fall due to generalized weakness and is had no complaints of specific joint pain or swelling or trauma.  He has some diffuse headache but no focal loss of function slurring his speech.  He does not appear to be a very thriving style of the patient and is a poor historian as well.  His main concern at this point is actually his disposition as he already wants to go home.  No family member present at bedside.    Past Medical History:  Past Medical History:   Diagnosis Date   • Diabetes mellitus (CMS/[facility])    • Hypertension      Past Surgical History:  Past Surgical History:   Procedure Laterality Date   • CATARACT EXTRACTION, BILATERAL        Home Meds:  Medications Prior to Admission   Medication Sig Dispense Refill Last Dose   • amLODIPine (NORVASC) 5 MG tablet Take 1 tablet by mouth Daily. 30 tablet 0    • aspirin 81 MG EC tablet Take 81 mg by mouth Daily.      • brimonidine (ALPHAGAN) 0.2 % ophthalmic solution Administer 1 drop to both eyes 3 (Three) Times a Day.      • furosemide (LASIX) 40 MG tablet Take 0.5 tablets by mouth  Daily.      • glipiZIDE (GLUCOTROL) 10 MG tablet Take 10 mg by mouth 2 (Two) Times a Day Before Meals.      • latanoprost (XALATAN) 0.005 % ophthalmic solution Administer 1 drop to both eyes Every Night.      • lisinopril (PRINIVIL,ZESTRIL) 10 MG tablet Take 1 tablet by mouth Daily. 30 tablet 0    • metFORMIN (GLUCOPHAGE) 1000 MG tablet Take 1,000 mg by mouth 2 (Two) Times a Day With Meals.      • potassium chloride (K-DUR) 10 MEQ CR tablet Take 1 tablet by mouth Daily. 30 tablet 0    • tamsulosin (FLOMAX) 0.4 MG capsule 24 hr capsule Take 1 capsule by mouth Every Night. 30 capsule 0        Allergies:  No Known Allergies  Immunizations:    There is no immunization history on file for this patient.  Social History:   Social History     Social History Narrative   • Not on file     Social History     Socioeconomic History   • Marital status:      Spouse name: Not on file   • Number of children: Not on file   • Years of education: Not on file   • Highest education level: Not on file   Tobacco Use   • Smoking status: Never Smoker   • Smokeless tobacco: Never Used   Substance and Sexual Activity   • Alcohol use: No     Frequency: Never   • Drug use: No   • Sexual activity: Defer       Family History:  History reviewed. No pertinent family history.     Review of Systems  See history of present illness and past medical history.  Patient admits to some headache but denies any loss of consciousness or loss of function.  Admits to some decreased oral intake and some nausea but denies emesis problems swallowing fever chills night sweats or any focal change to vision smell taste or sound.  Denies chest pain shortness of breath palpitations or cough.  Admits to some generalized weakness and generalized abdominal discomfort but denies any flank pains.  Admits to dysuria but denies any penile discharge.  Denies any rash bruising bleeding or focal loss of function.  Remainder of ROS is negative.    Objective:  tMax 24 hrs:  "Temp (24hrs), Av.8 °F (36.6 °C), Min:96.2 °F (35.7 °C), Max:98.8 °F (37.1 °C)    Vitals Ranges:   Temp:  [96.2 °F (35.7 °C)-98.8 °F (37.1 °C)] 98.8 °F (37.1 °C)  Heart Rate:  [] 93  Resp:  [16-18] 18  BP: (138-186)/(76-98) 176/90      Exam:  /90 (BP Location: Left arm, Patient Position: Lying)   Pulse 93   Temp 98.8 °F (37.1 °C) (Oral)   Resp 18   Ht 160 cm (63\")   Wt 69.7 kg (153 lb 9.6 oz)   SpO2 95%   BMI 27.21 kg/m²     General Appearance:    Alert, cooperative, no distress, non toxic appearing, AO x3, no family at bedside   Head:    Normocephalic, without obvious abnormality, atraumatic   Eyes:    PERRL, conjunctiva/corneas clear, EOM's intact, both eyes   Ears:    Normal external ear canals, both ears   Nose:   Nares normal, septum midline, mucosa normal, no drainage    or sinus tenderness   Throat:   Lips, mucosa, and tongue normal though dry mucous membranes   Neck:   Supple, symmetrical, trachea midline, no adenopathy;   No meningismus or JVD       Lungs:     Clear to auscultation bilaterally, respirations unlabored   Chest Wall:    No tenderness or deformity    Heart:    Regular rate and rhythm, S1 and S2 normal, + systolic  murmur   Abdomen:     Soft, mild supra pubic tenderness to palpation but nothing impressive and no rebound or guarding and no CVA tenderness to palpation, bowel sounds active all four quadrants,     no masses, no hepatomegaly, no splenomegaly   Extremities:  Loosely moving all, no cyanosis or edema   Pulses:   2+ and symmetric all extremities           Neurologic:   CNII-XII intact, globally weak but no focal deficit      .    Data Review:  Labs in chart were reviewed.        Results from last 7 days   Lab Units 19  0613   HEMOGLOBIN A1C % 6.80*      Imaging Results (all)     Procedure Component Value Units Date/Time    CT Head Without Contrast [[id number]] Collected:  19     Updated:  19    Narrative:       EXAMINATION: CT OF THE HEAD " WITHOUT CONTRAST     HISTORY: 78-year-old male status post fall with weakness and headache.  The patient was found on the floor.     TECHNIQUE: Contiguous axial images were obtained through the head  without IV contrast.     COMPARISON: None     FINDINGS: There is no evidence for intracranial hemorrhage. Mild diffuse  cortical atrophy is noted. There is no midline shift or abnormal  extra-axial fluid collection. The visualized paranasal sinuses and  mastoids are clear. The osseous structures of the skull have a normal  appearance.       Impression:       There is no evidence for an active or acute intracranial  abnormality. Mild diffuse cerebral atrophy is noted.     Radiation dose reduction techniques were utilized, including automated  exposure control and exposure modulation based on body size.     This report was finalized on 6/12/2019 9:00 PM by Dr. Dc Fong M.D.               Assessment:    Acute UTI    HTN (hypertension)    DM2 (diabetes mellitus, type 2) (CMS/[facility])    Aortic stenosis    Abnormal EKG    Elevated troponin      Plan:    UTI - compounded by patient's noncompliance with previous prescription as he was being treated for pyelonephritis.  Currently I do not appreciate any flank tenderness and he does not seem all that systemically ill.  Given his persistence of dysuria I will check a CT of the abdomen and pelvis to make sure there are no complicating abscess formation from his lack of compliance with previous treatment as well as to evaluate the prostate and will also check a PSA in case there is a certain element of prostatitis given BPH.   -IVF x1 L for renal protection and hold Lasix today    DM2 -hold metformin but continue with glipizide and use sliding scale for additional coverage    HTN -continue meds but holding Lasix for today -okay for low-dose ACE    Elevated troponin -renal function normal and EKG without - get Cards input if any further w/up is warranted     Lovenox for DVT  prophylaxis    Pepcid for GI prophylaxis    Further recommendations to follow his clinical course and will    Aidan Fernandez MD  6/13/2019  9:31 AM

## 2019-06-13 NOTE — PLAN OF CARE
Problem: Patient Care Overview  Goal: Plan of Care Review  Outcome: Ongoing (interventions implemented as appropriate)   06/13/19 1347 06/13/19 4011   Plan of Care Review   Progress --  improving   OTHER   Outcome Summary --  VSS; no complaints of pain or discomfort; IV fluids; IV antibiotics; cardiology, diabetes educator and nutritionist consulted   Coping/Psychosocial   Plan of Care Reviewed With patient --        Problem: Skin Injury Risk (Adult)  Goal: Skin Health and Integrity  Outcome: Ongoing (interventions implemented as appropriate)      Problem: Nutrition, Imbalanced: Inadequate Oral Intake (Adult)  Goal: Identify Related Risk Factors and Signs and Symptoms  Outcome: Ongoing (interventions implemented as appropriate)    Goal: Improved Oral Intake  Outcome: Ongoing (interventions implemented as appropriate)    Goal: Prevent Further Weight Loss  Outcome: Ongoing (interventions implemented as appropriate)

## 2019-06-13 NOTE — CONSULTS
Adult Nutrition  Assessment/PES    Patient Name:  Raymon Solis  YOB: 1940  MRN: [id number]  Admit Date:  6/12/2019    Assessment Date:  6/13/2019    Nutrition assessment triggered by MST score-3 and RN consult. Patient speaks some English-reported no appetite. From chart-30# weight loss in the past month. Severe malnutrition.  Provided nutrition therapy. He needs encouragement with meals and adequate po intake. He agreed to boost glucose control TID.   RD to follow.    Reason for Assessment     Row Name 06/13/19 1130          Reason for Assessment    Reason For Assessment  identified at risk by screening criteria;nurse/nurse practitioner consult     Diagnosis   Primary Problem:  Acute UTI; DM, HTN     Identified At Risk by Screening Criteria  MST SCORE 2+;unintentional loss of 10 lbs or more in the past 2 mos;reduced oral intake over the last month         Nutrition/Diet History     Row Name 06/13/19 1130          Nutrition/Diet History    Typical Food/Fluid Intake  poor appetite, 0% of breakfast         Anthropometrics     Row Name 06/13/19 1130          Usual Body Weight (UBW)    Weight Loss  unintentional     Weight Loss Time Frame  30# in the past month        Body Mass Index (BMI)    BMI Assessment  BMI 25-29.9: overweight         Labs/Tests/Procedures/Meds     Row Name 06/13/19 1131          Labs/Procedures/Meds    Lab Results Reviewed  reviewed, pertinent        Diagnostic Tests/Procedures    Diagnostic Test/Procedure Reviewed  reviewed, pertinent        Medications    Pertinent Medications Reviewed  reviewed, pertinent     Pertinent Medications Comments  glipizide, insulin, NaCl         Physical Findings     Row Name 06/13/19 1131          Physical Findings    Overall Physical Appearance  generalized wasting;loss of subcutaneous fat;loss of muscle mass B=17         Estimated/Assessed Needs     Row Name 06/13/19 1131          Calculation Measurements    Weight Used For Calculations  69.7 kg  (153 lb 10.6 oz)        Estimated/Assessed Needs    Additional Documentation  KCAL/KG (Group);Protein Requirements (Group);Fluid Requirements (Group)        KCAL/KG    KCAL/KG  25 Kcal/Kg (kcal);30 Kcal/Kg (kcal)     25 Kcal/Kg (kcal)  1742.5     30 Kcal/Kg (kcal)  2091        Protein Requirements    Est Protein Requirement Amount (gms/kg)  1.2 gm protein     Estimated Protein Requirements (gms/day)  83.64        Fluid Requirements    Estimated Fluid Requirements (mL/day)  1750     RDA Method (mL)  1750     Burbank-Segar Method (over 20 kg)  2894         Nutrition Prescription Ordered     Row Name 06/13/19 1131          Nutrition Prescription PO    Common Modifiers  Consistent Carbohydrate         Evaluation of Received Nutrient/Fluid Intake     Row Name 06/13/19 1131          Calculation Measurements    Weight Used For Calculations  69.7 kg (153 lb 10.6 oz)        PO Evaluation    Number of Meals  1     % PO Intake  0         Evaluation of Prescribed Nutrient/Fluid Intake     Row Name 06/13/19 1131          Calculation Measurements    Weight Used For Calculations  69.7 kg (153 lb 10.6 oz)         Malnutrition Severity Assessment     Row Name 06/13/19 1132          Malnutrition Severity Assessment    Malnutrition Type  Starvation - Related Malnutrition        Unintentional Weight Loss     Unintentional Weight Loss   Weight loss greater than 5% in one month 15% weight loss over the past month        Muscle Loss    Loss of Muscle Mass Findings  Severe     Evangelical Region  Severe - deep hollowing/scooping, lack of muscle to touch, facial bones well defined     Clavicle Bone Region  Severe - protruding prominent bone     Acromion Bone Region  Severe - squared shoulders, bones, and acromion process protrusion prominent        Fat Loss    Subcutaneous Fat Loss Findings  Moderate     Orbital Region   Moderate -  somewhat hollowness, slightly dark circles        Criteria Met (Must meet criteria for severity in at least 2 of  these categories: M Wasting, Fat Loss, Fluid, Secondary Signs, Wt. Status, Intake)    Patient meets criteria for   Severe Malnutrition           Problem/Interventions:  Problem 1     Row Name 06/13/19 1132          Nutrition Diagnoses Problem 1    Problem 1  Malnutrition     Etiology (related to)  MNT for Treatment/Condition     Signs/Symptoms (evidenced by)  Report of Mnimal PO Intake;Unintended Weight Change     Unintended Weight Change  Loss     Number of Pounds Lost  30#     Weight loss time period  1 month                 Intervention Goal     Row Name 06/13/19 1133          Intervention Goal    General  Maintain nutrition;Meet nutritional needs for age/condition     PO  Tolerate PO;Increase intake     Weight  Appropriate weight gain         Nutrition Intervention     Row Name 06/13/19 1133          Nutrition Intervention    RD/Tech Action  Interview for preference;Follow Tx progress;Care plan reviewd;Encourage intake;Recommend/ordered;Supplement provided     Recommended/Ordered  Supplement         Nutrition Prescription     Row Name 06/13/19 1133          Nutrition Prescription PO    PO Prescription  Begin/change supplement     Supplement  Boost Glucose Control     Supplement Frequency  3 times a day     New PO Prescription Ordered?  Yes         Education/Evaluation     Row Name 06/13/19 1133          Education    Education  Will Instruct as appropriate        Monitor/Evaluation    Monitor  Per protocol           Electronically signed by:  Yajaira Alfaro RD  06/13/19 11:33 AM

## 2019-06-13 NOTE — PROGRESS NOTES
Discharge Planning Assessment  [facility]     Patient Name: Raymon Solis  MRN: [id number]  Today's Date: 6/13/2019    Admit Date: 6/12/2019    Discharge Needs Assessment     Row Name 06/13/19 1419       Living Environment    Lives With  spouse    Current Living Arrangements  home/apartment/condo    Primary Care Provided by  self    Provides Primary Care For  no one    Family Caregiver if Needed  spouse;child(laquita), adult    Family Caregiver Names  Layla Abrams-daughter    Quality of Family Relationships  helpful;involved;supportive    Able to Return to Prior Arrangements  yes       Resource/Environmental Concerns    Resource/Environmental Concerns  none    Transportation Concerns  car, none       Transition Planning    Patient/Family Anticipates Transition to  home with help/services;home with family    Patient/Family Anticipated Services at Transition  home health care    Transportation Anticipated  family or friend will provide       Discharge Needs Assessment    Readmission Within the Last 30 Days  previous discharge plan unsuccessful    Concerns to be Addressed  discharge planning    Equipment Currently Used at Home  none    Anticipated Changes Related to Illness  none    Equipment Needed After Discharge  none    Outpatient/Agency/Support Group Needs  homecare agency    Discharge Facility/Level of Care Needs  home with home health        Discharge Plan     Row Name 06/13/19 1421       Plan    Plan  Home, current with [facility].     Patient/Family in Agreement with Plan  yes    Plan Comments  CCP role explained and face sheet verified; emergency contact is daughter Layla Abrams [phone], she will provide transportation at discharge; current pharmacy is [facility] at [address] [facility] [place]; no DME; daughter states patient has living will; patient is current with [facility], no previous inpatient rehab stay; plan is home with spouse and [facility]. CHANCE Leal         Destination      No service  coordination in this encounter.      Durable Medical Equipment      No service coordination in this encounter.      Dialysis/Infusion      No service coordination in this encounter.      Home Medical Care      No service coordination in this encounter.      Therapy      No service coordination in this encounter.      Community Resources      No service coordination in this encounter.          Demographic Summary     Row Name 06/13/19 1414       General Information    Admission Type  inpatient    Arrived From  home    Referral Source  admission list    Reason for Consult  discharge planning        Functional Status     Row Name 06/13/19 1419       Functional Status    Usual Activity Tolerance  good    Current Activity Tolerance  moderate       Functional Status, IADL    Medications  independent    Meal Preparation  independent    Housekeeping  independent    Laundry  independent    Shopping  independent        Psychosocial    No documentation.       Abuse/Neglect    No documentation.       Legal     Row Name 06/13/19 1419       Financial/Legal    Finance Comments  Daughter states patient has a living will.         Substance Abuse    No documentation.       Patient Forms    No documentation.           Layla Perera RN

## 2019-06-13 NOTE — PROGRESS NOTES
Informed Dr. Licea that pt insurance is out of network with [facility]. Dr. Licea stated that due to pts labs and elevated troponin pt was, at this time, too unstable for transfer. Informed pt and family present in the room. Wilda Pemberton RN'

## 2019-06-14 VITALS
DIASTOLIC BLOOD PRESSURE: 90 MMHG | WEIGHT: 153.6 LBS | BODY MASS INDEX: 27.21 KG/M2 | OXYGEN SATURATION: 96 % | HEART RATE: 97 BPM | RESPIRATION RATE: 20 BRPM | HEIGHT: 63 IN | TEMPERATURE: 97.9 F | SYSTOLIC BLOOD PRESSURE: 179 MMHG

## 2019-06-14 LAB
ANION GAP SERPL CALCULATED.3IONS-SCNC: 10.6 MMOL/L
BACTERIA SPEC AEROBE CULT: ABNORMAL
BUN BLD-MCNC: 13 MG/DL (ref 8–23)
BUN/CREAT SERPL: 14.3 (ref 7–25)
CALCIUM SPEC-SCNC: 9.7 MG/DL (ref 8.6–10.5)
CHLORIDE SERPL-SCNC: 105 MMOL/L (ref 98–107)
CO2 SERPL-SCNC: 31.4 MMOL/L (ref 22–29)
CREAT BLD-MCNC: 0.91 MG/DL (ref 0.76–1.27)
DEPRECATED RDW RBC AUTO: 41.7 FL (ref 37–54)
ERYTHROCYTE [DISTWIDTH] IN BLOOD BY AUTOMATED COUNT: 12.8 % (ref 12.3–15.4)
GFR SERPL CREATININE-BSD FRML MDRD: 81 ML/MIN/1.73
GFR SERPL CREATININE-BSD FRML MDRD: 98 ML/MIN/1.73
GLUCOSE BLD-MCNC: 121 MG/DL (ref 65–99)
GLUCOSE BLDC GLUCOMTR-MCNC: 102 MG/DL (ref 70–130)
GLUCOSE BLDC GLUCOMTR-MCNC: 117 MG/DL (ref 70–130)
HCT VFR BLD AUTO: 37.6 % (ref 37.5–51)
HGB BLD-MCNC: 12 G/DL (ref 13–17.7)
MAGNESIUM SERPL-MCNC: 2.1 MG/DL (ref 1.6–2.4)
MCH RBC QN AUTO: 28.4 PG (ref 26.6–33)
MCHC RBC AUTO-ENTMCNC: 31.9 G/DL (ref 31.5–35.7)
MCV RBC AUTO: 88.9 FL (ref 79–97)
PLATELET # BLD AUTO: 365 10*3/MM3 (ref 140–450)
PMV BLD AUTO: 9 FL (ref 6–12)
POTASSIUM BLD-SCNC: 3.4 MMOL/L (ref 3.5–5.2)
RBC # BLD AUTO: 4.23 10*6/MM3 (ref 4.14–5.8)
SODIUM BLD-SCNC: 147 MMOL/L (ref 136–145)
WBC NRBC COR # BLD: 12.53 10*3/MM3 (ref 3.4–10.8)

## 2019-06-14 PROCEDURE — 82962 GLUCOSE BLOOD TEST: CPT

## 2019-06-14 PROCEDURE — 84154 ASSAY OF PSA FREE: CPT | Performed by: HOSPITALIST

## 2019-06-14 PROCEDURE — 25010000002 CEFTRIAXONE PER 250 MG: Performed by: NURSE PRACTITIONER

## 2019-06-14 PROCEDURE — 85027 COMPLETE CBC AUTOMATED: CPT | Performed by: HOSPITALIST

## 2019-06-14 PROCEDURE — 80048 BASIC METABOLIC PNL TOTAL CA: CPT | Performed by: HOSPITALIST

## 2019-06-14 PROCEDURE — 84153 ASSAY OF PSA TOTAL: CPT | Performed by: HOSPITALIST

## 2019-06-14 PROCEDURE — 83735 ASSAY OF MAGNESIUM: CPT | Performed by: HOSPITALIST

## 2019-06-14 RX ORDER — CEFDINIR 300 MG/1
300 CAPSULE ORAL 2 TIMES DAILY
Qty: 10 CAPSULE | Refills: 0 | Status: SHIPPED | OUTPATIENT
Start: 2019-06-14 | End: 2019-06-14 | Stop reason: SDUPTHER

## 2019-06-14 RX ORDER — CEFDINIR 300 MG/1
300 CAPSULE ORAL 2 TIMES DAILY
Qty: 20 CAPSULE | Refills: 0 | Status: SHIPPED | OUTPATIENT
Start: 2019-06-14 | End: 2020-01-08

## 2019-06-14 RX ORDER — POTASSIUM CHLORIDE 750 MG/1
40 CAPSULE, EXTENDED RELEASE ORAL ONCE
Status: COMPLETED | OUTPATIENT
Start: 2019-06-14 | End: 2019-06-14

## 2019-06-14 RX ADMIN — POTASSIUM CHLORIDE 40 MEQ: 750 CAPSULE, EXTENDED RELEASE ORAL at 12:56

## 2019-06-14 RX ADMIN — LISINOPRIL 10 MG: 10 TABLET ORAL at 09:42

## 2019-06-14 RX ADMIN — BRIMONIDINE TARTRATE 1 DROP: 2 SOLUTION OPHTHALMIC at 09:52

## 2019-06-14 RX ADMIN — FAMOTIDINE 20 MG: 10 INJECTION INTRAVENOUS at 09:42

## 2019-06-14 RX ADMIN — ASPIRIN 81 MG: 81 TABLET, COATED ORAL at 09:42

## 2019-06-14 RX ADMIN — POTASSIUM CHLORIDE 40 MEQ: 750 CAPSULE, EXTENDED RELEASE ORAL at 09:43

## 2019-06-14 RX ADMIN — SODIUM CHLORIDE, PRESERVATIVE FREE 3 ML: 5 INJECTION INTRAVENOUS at 09:44

## 2019-06-14 RX ADMIN — CEFTRIAXONE SODIUM 1 G: 1 INJECTION, SOLUTION INTRAVENOUS at 00:03

## 2019-06-14 RX ADMIN — SODIUM CHLORIDE, PRESERVATIVE FREE 3 ML: 5 INJECTION INTRAVENOUS at 00:03

## 2019-06-14 RX ADMIN — GLIPIZIDE 10 MG: 10 TABLET ORAL at 09:42

## 2019-06-14 RX ADMIN — AMLODIPINE BESYLATE 5 MG: 5 TABLET ORAL at 09:53

## 2019-06-14 NOTE — PROGRESS NOTES
Continued Stay Note  [facility]     Patient Name: Raymon Solis  MRN: [id number]  Today's Date: 6/14/2019    Admit Date: 6/12/2019    Discharge Plan     Row Name 06/14/19 1414       Plan    Plan  Home with [place] .     Plan Comments  Patient refused to sign the out of network/financial form on 6/13/19. CHANCE Leal        Discharge Codes    No documentation.       Expected Discharge Date and Time     Expected Discharge Date Expected Discharge Time    Jun 14, 2019             Layla Perera RN

## 2019-06-14 NOTE — PLAN OF CARE
Problem: Patient Care Overview  Goal: Plan of Care Review   06/14/19 1230   OTHER   Outcome Summary Pt has orders for dc. Meet with 77 y/o at bedside to assess needs for DM ed per database. pt does not appear approp for detailed ed at this time; pt attempting to stand up and get dressed. Call staff for assistance to keep pt safe.    Coping/Psychosocial   Plan of Care Reviewed With patient

## 2019-06-14 NOTE — PLAN OF CARE
Problem: Patient Care Overview  Goal: Plan of Care Review   06/14/19 3097   OTHER   Outcome Summary Pt and daughter describe acute diarrhea since admission. Pt was embarrased to tell staff initally and is hesitant about taking medications because he believe they may be the cause. Otherwise patient VSS. Safety maintained. Will continue to monitor

## 2019-06-14 NOTE — PROGRESS NOTES
Continued Stay Note  [facility]     Patient Name: Raymon Solis  MRN: [id number]  Today's Date: 6/14/2019    Admit Date: 6/12/2019    Discharge Plan     Row Name 06/14/19 1414       Plan    Plan  Home with [facility].     Plan Comments  Spoke with [facility] to notify of patient discharge. CHANCE Leal        Discharge Codes    No documentation.       Expected Discharge Date and Time     Expected Discharge Date Expected Discharge Time    Jun 14, 2019             Layla Perera RN

## 2019-06-14 NOTE — DISCHARGE SUMMARY
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     Procedures Performed       Consults     Date and Time Order Name Status Description    6/13/2019 0936 Inpatient Cardiology Consult Completed     6/12/2019 2142 [facility] (on-call MD unless specified) Completed     5/13/2019 1851 [facility] (on-call MD unless specified) Completed         Hospital Course  Please see history and physical for details. Patient is a 78 y.o. male initially admitted by myself due to complaints of burning urination.  Patient was admitted for urinary tract infection the previously discharged on Keflex due to concerns for pyelonephritis.  This time I do not feel that he has aspects of pyelonephritis present but due to his medical noncompliance and not completing antibiotics as previously instructed he is back with recurrent genitourinary infection.  Urine culture grew a E. coli that is resistant to penicillin and I will send him home on Cefdinir twice daily as opposed to 3 times daily Keflex to try to increase chances of compliance.  Since I did evaluate the abdomen further with the CT as I did not want to miss any abscess or further compounding issues, you can clearly see aspects of pyelonephritis on the CAT scan so I will extend his antibiotic regimen for 10 days post discharge.  There is also a hypodense solid lesion in the superior pole the left kidney measuring 2.1 cm which was there previously and concerning for neoplasm.  I will have [facility] coordinate outpatient  urological follow-up with this patient for further evaluation.  He can resume his diabetic meds at discharge and his blood pressure control is labile.  I will defer to PCP in regards to further management of blood pressure post discharge but consider increase in ACE inhibitor versus amlodipine.  We did hold his Lasix while here and that will be resumed post discharge.  I consulted cardiology secondary to elevated troponin.  I appreciate input and assistance per Dr. Tim Salas.  No further recommendations were advised from cardiac perspective due to elevated troponin and abnormal EKG.  They agree and regards to consideration of lisinopril versus Norvasc and ultimately I will defer to PCP.  All questions answered to patient prior to disposition.  I have personally never seen any family at bedside during this admission.  Case management has informed me that patient was using [employer] prior to admission and asked that that be resumed at discharge.  Patient will be discharged home today.        Condition on Discharge: Improved.     Temp:  [97.5 °F (36.4 °C)-98 °F (36.7 °C)] 97.9 °F (36.6 °C)  Heart Rate:  [] 97  Resp:  [20] 20  BP: (173-185)/(81-86) 185/86  Body mass index is 27.21 kg/m².    Physical Exam   Constitutional: He is oriented to person, place, and time. He appears well-developed.   HENT:   Head: Normocephalic.   Neck: Neck supple. No JVD present.   Cardiovascular: Normal rate and regular rhythm.   Pulmonary/Chest: Effort normal and breath sounds normal. No respiratory distress.   Abdominal: Soft. Bowel sounds are normal. He exhibits no distension. There is no tenderness. There is no guarding.   No CVA tenderness to palpation   Musculoskeletal: He exhibits no edema.   Grossly moving all though generalized weakness noted   Neurological: He is alert and oriented to person, place, and time.        Discharge Medications      New Medications      Instructions Start Date   cefdinir 300 MG  capsule  Commonly known as:  OMNICEF   300 mg, Oral, 2 Times Daily         Continue These Medications      Instructions Start Date   amLODIPine 5 MG tablet  Commonly known as:  NORVASC   5 mg, Oral, Every 24 Hours Scheduled      aspirin 81 MG EC tablet   81 mg, Oral, Daily      brimonidine 0.2 % ophthalmic solution  Commonly known as:  ALPHAGAN   1 drop, Both Eyes, 3 Times Daily      furosemide 40 MG tablet  Commonly known as:  LASIX   20 mg, Oral, Daily      glipiZIDE 10 MG tablet  Commonly known as:  GLUCOTROL   10 mg, Oral, 2 Times Daily Before Meals      latanoprost 0.005 % ophthalmic solution  Commonly known as:  XALATAN   1 drop, Both Eyes, Nightly      lisinopril 10 MG tablet  Commonly known as:  PRINIVIL,ZESTRIL   10 mg, Oral, Daily      metFORMIN 1000 MG tablet  Commonly known as:  GLUCOPHAGE   1,000 mg, Oral, 2 Times Daily With Meals      potassium chloride 10 MEQ CR tablet  Commonly known as:  K-DUR   10 mEq, Oral, Daily      tamsulosin 0.4 MG capsule 24 hr capsule  Commonly known as:  FLOMAX   1 capsule, Oral, Nightly              Additional Instructions for the Follow-ups that You Need to Schedule     Discharge Follow-up with PCP   As directed       Currently Documented PCP:    Magali Burgess MD    PCP Phone Number:    [phone]     Follow Up Details:  pcp 2 weeks            Contact information for follow-up providers     Tim Salas MD Follow up in 1 month(s).    Specialty:  Cardiology  Contact information:  [address] [facility] [address]  [facility] [id number] [phone]             Magali Burgess MD .    Specialty:  Internal Medicine  Why:  pcp 2 weeks  Contact information:  [address] [facility] [address]  [facility] [id number] [phone]                   Contact information for after-discharge care     Home Medical Care     [facility] .    Service:  Home Health Services  Contact information:  [address] [place] [id number] [phone]                           Test  Results Pending at Discharge   Order Current Status    PSA, Total & Free In process    Blood Culture - Blood, Arm, Right Preliminary result    Blood Culture - Blood, Hand, Right Preliminary result         Aidan Fernandez MD  06/14/19  12:01 PM    Discharge time spent greater than 30 minutes.

## 2019-06-15 ENCOUNTER — READMISSION MANAGEMENT (OUTPATIENT)
Dept: CALL CENTER | Facility: HOSPITAL | Age: 79
End: 2019-06-15

## 2019-06-15 LAB
PSA FREE MFR SERPL: 19.2 %
PSA FREE SERPL-MCNC: 0.25 NG/ML
PSA SERPL-MCNC: 1.3 NG/ML (ref 0–4)

## 2019-06-16 NOTE — OUTREACH NOTE
Prep Survey      Responses   Facility patient discharged from?  [facility]   Is patient eligible?  Yes   Discharge diagnosis  Acute pyelonephritis   Does the patient have one of the following disease processes/diagnoses(primary or secondary)?  Other   Does the patient have Home health ordered?  Yes   What is the Home health agency?   [facility]    Is there a DME ordered?  No   Prep survey completed?  Yes          Emi Malhotra RN

## 2019-06-17 ENCOUNTER — READMISSION MANAGEMENT (OUTPATIENT)
Dept: CALL CENTER | Facility: HOSPITAL | Age: 79
End: 2019-06-17

## 2019-06-17 LAB
BACTERIA SPEC AEROBE CULT: NORMAL
BACTERIA SPEC AEROBE CULT: NORMAL

## 2019-06-17 NOTE — PROGRESS NOTES
Continued Stay Note  [facility]     Patient Name: Raymon Solis  MRN: [id number]  Today's Date: 6/17/2019    Admit Date: 6/12/2019    Discharge Plan     Row Name 06/17/19 1420       Plan    Final Discharge Disposition Code  06 - home with home health care    Final Note  Home with [facility]        Discharge Codes    No documentation.       Expected Discharge Date and Time     Expected Discharge Date Expected Discharge Time    Jun 14, 2019             Emi Ma RN

## 2019-06-17 NOTE — OUTREACH NOTE
Medical Week 1 Survey      Responses   Facility patient discharged from?  [facility]   Does the patient have one of the following disease processes/diagnoses(primary or secondary)?  Other   Is there a successful TCM telephone encounter documented?  No   Week 1 attempt successful?  No   Unsuccessful attempts  Attempt 1          Analia Shoemaker RN

## 2019-06-19 ENCOUNTER — READMISSION MANAGEMENT (OUTPATIENT)
Dept: CALL CENTER | Facility: HOSPITAL | Age: 79
End: 2019-06-19

## 2019-06-19 NOTE — OUTREACH NOTE
Medical Week 1 Survey      Responses   Facility patient discharged from?  [facility]   Does the patient have one of the following disease processes/diagnoses(primary or secondary)?  Other   Is there a successful TCM telephone encounter documented?  No   Week 1 attempt successful?  No   Unsuccessful attempts  Attempt 2          Layla Lainez RN

## 2019-06-27 ENCOUNTER — READMISSION MANAGEMENT (OUTPATIENT)
Dept: CALL CENTER | Facility: HOSPITAL | Age: 79
End: 2019-06-27

## 2019-06-27 NOTE — OUTREACH NOTE
Medical Week 2 Survey      Responses   Facility patient discharged from?  [facility]   Does the patient have one of the following disease processes/diagnoses(primary or secondary)?  Other   Week 2 attempt successful?  No   Unsuccessful attempts  Attempt 1          Hilda Bernal RN

## 2019-06-28 ENCOUNTER — READMISSION MANAGEMENT (OUTPATIENT)
Dept: CALL CENTER | Facility: HOSPITAL | Age: 79
End: 2019-06-28

## 2019-06-28 NOTE — OUTREACH NOTE
Medical Week 2 Survey      Responses   Facility patient discharged from?  [facility]   Does the patient have one of the following disease processes/diagnoses(primary or secondary)?  Other   Week 2 attempt successful?  No   Unsuccessful attempts  Attempt 2          Analia Shoemaker RN

## 2020-01-08 ENCOUNTER — APPOINTMENT (OUTPATIENT)
Dept: GENERAL RADIOLOGY | Facility: HOSPITAL | Age: 80
End: 2020-01-08

## 2020-01-08 ENCOUNTER — HOSPITAL ENCOUNTER (INPATIENT)
Facility: HOSPITAL | Age: 80
LOS: 8 days | Discharge: HOME HEALTH | End: 2020-01-16
Attending: EMERGENCY MEDICINE | Admitting: INTERNAL MEDICINE

## 2020-01-08 DIAGNOSIS — I48.91 ATRIAL FIBRILLATION, UNSPECIFIED TYPE (HCC): ICD-10-CM

## 2020-01-08 DIAGNOSIS — J18.9 PNEUMONIA OF LEFT LOWER LOBE DUE TO INFECTIOUS ORGANISM: ICD-10-CM

## 2020-01-08 DIAGNOSIS — A41.9 SEPSIS WITHOUT ACUTE ORGAN DYSFUNCTION, DUE TO UNSPECIFIED ORGANISM (HCC): Primary | ICD-10-CM

## 2020-01-08 PROBLEM — Z91.199 HISTORY OF NONCOMPLIANCE WITH MEDICAL TREATMENT: Status: ACTIVE | Noted: 2020-01-08

## 2020-01-08 LAB
ALBUMIN SERPL-MCNC: 2.7 G/DL (ref 3.5–5.2)
ALBUMIN/GLOB SERPL: 0.7 G/DL
ALP SERPL-CCNC: 154 U/L (ref 39–117)
ALT SERPL W P-5'-P-CCNC: 8 U/L (ref 1–41)
ANION GAP SERPL CALCULATED.3IONS-SCNC: 15.7 MMOL/L (ref 5–15)
APTT PPP: 27.4 SECONDS (ref 22.7–35.4)
AST SERPL-CCNC: 16 U/L (ref 1–40)
B PARAPERT DNA SPEC QL NAA+PROBE: NOT DETECTED
B PERT DNA SPEC QL NAA+PROBE: NOT DETECTED
BACTERIA UR QL AUTO: ABNORMAL /HPF
BASOPHILS # BLD AUTO: 0.07 10*3/MM3 (ref 0–0.2)
BASOPHILS NFR BLD AUTO: 0.3 % (ref 0–1.5)
BILIRUB SERPL-MCNC: 0.8 MG/DL (ref 0.2–1.2)
BILIRUB UR QL STRIP: NEGATIVE
BUN BLD-MCNC: 15 MG/DL (ref 8–23)
BUN/CREAT SERPL: 21.1 (ref 7–25)
C PNEUM DNA NPH QL NAA+NON-PROBE: NOT DETECTED
CALCIUM SPEC-SCNC: 8.5 MG/DL (ref 8.6–10.5)
CHLORIDE SERPL-SCNC: 102 MMOL/L (ref 98–107)
CK SERPL-CCNC: 205 U/L (ref 20–200)
CLARITY UR: ABNORMAL
CO2 SERPL-SCNC: 26.3 MMOL/L (ref 22–29)
COLOR UR: ABNORMAL
CREAT BLD-MCNC: 0.71 MG/DL (ref 0.76–1.27)
D-LACTATE SERPL-SCNC: 2.1 MMOL/L (ref 0.5–2)
D-LACTATE SERPL-SCNC: 2.5 MMOL/L (ref 0.5–2)
DEPRECATED RDW RBC AUTO: 41.3 FL (ref 37–54)
EOSINOPHIL # BLD AUTO: 0 10*3/MM3 (ref 0–0.4)
EOSINOPHIL NFR BLD AUTO: 0 % (ref 0.3–6.2)
ERYTHROCYTE [DISTWIDTH] IN BLOOD BY AUTOMATED COUNT: 12.8 % (ref 12.3–15.4)
FLUAV H1 2009 PAND RNA NPH QL NAA+PROBE: NOT DETECTED
FLUAV H1 HA GENE NPH QL NAA+PROBE: NOT DETECTED
FLUAV H3 RNA NPH QL NAA+PROBE: NOT DETECTED
FLUAV SUBTYP SPEC NAA+PROBE: NOT DETECTED
FLUBV RNA ISLT QL NAA+PROBE: NOT DETECTED
GFR SERPL CREATININE-BSD FRML MDRD: 107 ML/MIN/1.73
GFR SERPL CREATININE-BSD FRML MDRD: 130 ML/MIN/1.73
GGT SERPL-CCNC: 61 U/L (ref 8–61)
GLOBULIN UR ELPH-MCNC: 3.7 GM/DL
GLUCOSE BLD-MCNC: 131 MG/DL (ref 65–99)
GLUCOSE BLDC GLUCOMTR-MCNC: 127 MG/DL (ref 70–130)
GLUCOSE BLDC GLUCOMTR-MCNC: 134 MG/DL (ref 70–130)
GLUCOSE UR STRIP-MCNC: NEGATIVE MG/DL
HADV DNA SPEC NAA+PROBE: NOT DETECTED
HCOV 229E RNA SPEC QL NAA+PROBE: NOT DETECTED
HCOV HKU1 RNA SPEC QL NAA+PROBE: NOT DETECTED
HCOV NL63 RNA SPEC QL NAA+PROBE: NOT DETECTED
HCOV OC43 RNA SPEC QL NAA+PROBE: NOT DETECTED
HCT VFR BLD AUTO: 43.8 % (ref 37.5–51)
HGB BLD-MCNC: 14.7 G/DL (ref 13–17.7)
HGB UR QL STRIP.AUTO: ABNORMAL
HMPV RNA NPH QL NAA+NON-PROBE: NOT DETECTED
HOLD SPECIMEN: NORMAL
HPIV1 RNA SPEC QL NAA+PROBE: NOT DETECTED
HPIV2 RNA SPEC QL NAA+PROBE: NOT DETECTED
HPIV3 RNA NPH QL NAA+PROBE: NOT DETECTED
HPIV4 P GENE NPH QL NAA+PROBE: NOT DETECTED
HYALINE CASTS UR QL AUTO: ABNORMAL /LPF
IMM GRANULOCYTES # BLD AUTO: 0.21 10*3/MM3 (ref 0–0.05)
IMM GRANULOCYTES NFR BLD AUTO: 0.8 % (ref 0–0.5)
INR PPP: 1.16 (ref 0.9–1.1)
KETONES UR QL STRIP: ABNORMAL
LEUKOCYTE ESTERASE UR QL STRIP.AUTO: ABNORMAL
LYMPHOCYTES # BLD AUTO: 1.05 10*3/MM3 (ref 0.7–3.1)
LYMPHOCYTES NFR BLD AUTO: 3.8 % (ref 19.6–45.3)
M PNEUMO IGG SER IA-ACNC: NOT DETECTED
MAGNESIUM SERPL-MCNC: 1.6 MG/DL (ref 1.6–2.4)
MCH RBC QN AUTO: 29.6 PG (ref 26.6–33)
MCHC RBC AUTO-ENTMCNC: 33.6 G/DL (ref 31.5–35.7)
MCV RBC AUTO: 88.3 FL (ref 79–97)
MONOCYTES # BLD AUTO: 1.59 10*3/MM3 (ref 0.1–0.9)
MONOCYTES NFR BLD AUTO: 5.8 % (ref 5–12)
NEUTROPHILS # BLD AUTO: 24.61 10*3/MM3 (ref 1.7–7)
NEUTROPHILS NFR BLD AUTO: 89.3 % (ref 42.7–76)
NITRITE UR QL STRIP: NEGATIVE
NRBC BLD AUTO-RTO: 0 /100 WBC (ref 0–0.2)
PH UR STRIP.AUTO: 5.5 [PH] (ref 5–8)
PLATELET # BLD AUTO: 448 10*3/MM3 (ref 140–450)
PMV BLD AUTO: 8.7 FL (ref 6–12)
POTASSIUM BLD-SCNC: 2.7 MMOL/L (ref 3.5–5.2)
PROCALCITONIN SERPL-MCNC: 0.42 NG/ML (ref 0.1–0.25)
PROT SERPL-MCNC: 6.4 G/DL (ref 6–8.5)
PROT UR QL STRIP: ABNORMAL
PROTHROMBIN TIME: 14.5 SECONDS (ref 11.7–14.2)
RBC # BLD AUTO: 4.96 10*6/MM3 (ref 4.14–5.8)
RBC # UR: ABNORMAL /HPF
REF LAB TEST METHOD: ABNORMAL
RHINOVIRUS RNA SPEC NAA+PROBE: NOT DETECTED
RSV RNA NPH QL NAA+NON-PROBE: NOT DETECTED
SODIUM BLD-SCNC: 144 MMOL/L (ref 136–145)
SP GR UR STRIP: 1.02 (ref 1–1.03)
SQUAMOUS #/AREA URNS HPF: ABNORMAL /HPF
TROPONIN T SERPL-MCNC: 0.04 NG/ML (ref 0–0.03)
URINE MYOGLOBIN, QUALITATIVE: POSITIVE
UROBILINOGEN UR QL STRIP: ABNORMAL
WBC NRBC COR # BLD: 27.53 10*3/MM3 (ref 3.4–10.8)
WBC UR QL AUTO: ABNORMAL /HPF

## 2020-01-08 PROCEDURE — 84132 ASSAY OF SERUM POTASSIUM: CPT | Performed by: NURSE PRACTITIONER

## 2020-01-08 PROCEDURE — 71045 X-RAY EXAM CHEST 1 VIEW: CPT

## 2020-01-08 PROCEDURE — 84484 ASSAY OF TROPONIN QUANT: CPT | Performed by: EMERGENCY MEDICINE

## 2020-01-08 PROCEDURE — 85610 PROTHROMBIN TIME: CPT | Performed by: EMERGENCY MEDICINE

## 2020-01-08 PROCEDURE — 80053 COMPREHEN METABOLIC PANEL: CPT | Performed by: EMERGENCY MEDICINE

## 2020-01-08 PROCEDURE — 25010000003 POTASSIUM CHLORIDE 10 MEQ/100ML SOLUTION: Performed by: EMERGENCY MEDICINE

## 2020-01-08 PROCEDURE — 87077 CULTURE AEROBIC IDENTIFY: CPT | Performed by: EMERGENCY MEDICINE

## 2020-01-08 PROCEDURE — 85730 THROMBOPLASTIN TIME PARTIAL: CPT | Performed by: EMERGENCY MEDICINE

## 2020-01-08 PROCEDURE — 25010000003 CEFTRIAXONE PER 250 MG: Performed by: EMERGENCY MEDICINE

## 2020-01-08 PROCEDURE — 0100U HC BIOFIRE FILMARRAY RESP PANEL 2: CPT | Performed by: NURSE PRACTITIONER

## 2020-01-08 PROCEDURE — 99285 EMERGENCY DEPT VISIT HI MDM: CPT

## 2020-01-08 PROCEDURE — 83874 ASSAY OF MYOGLOBIN: CPT | Performed by: EMERGENCY MEDICINE

## 2020-01-08 PROCEDURE — 36415 COLL VENOUS BLD VENIPUNCTURE: CPT

## 2020-01-08 PROCEDURE — 83735 ASSAY OF MAGNESIUM: CPT | Performed by: EMERGENCY MEDICINE

## 2020-01-08 PROCEDURE — 82550 ASSAY OF CK (CPK): CPT | Performed by: EMERGENCY MEDICINE

## 2020-01-08 PROCEDURE — 82977 ASSAY OF GGT: CPT | Performed by: NURSE PRACTITIONER

## 2020-01-08 PROCEDURE — 25010000002 AZITHROMYCIN PER 500 MG: Performed by: EMERGENCY MEDICINE

## 2020-01-08 PROCEDURE — 84145 PROCALCITONIN (PCT): CPT | Performed by: EMERGENCY MEDICINE

## 2020-01-08 PROCEDURE — 81001 URINALYSIS AUTO W/SCOPE: CPT | Performed by: EMERGENCY MEDICINE

## 2020-01-08 PROCEDURE — 87186 SC STD MICRODIL/AGAR DIL: CPT | Performed by: EMERGENCY MEDICINE

## 2020-01-08 PROCEDURE — P9612 CATHETERIZE FOR URINE SPEC: HCPCS

## 2020-01-08 PROCEDURE — 82962 GLUCOSE BLOOD TEST: CPT

## 2020-01-08 PROCEDURE — 87086 URINE CULTURE/COLONY COUNT: CPT | Performed by: EMERGENCY MEDICINE

## 2020-01-08 PROCEDURE — 25810000003 SODIUM CHLORIDE 0.9 % WITH KCL 20 MEQ 20-0.9 MEQ/L-% SOLUTION: Performed by: INTERNAL MEDICINE

## 2020-01-08 PROCEDURE — 85025 COMPLETE CBC W/AUTO DIFF WBC: CPT | Performed by: EMERGENCY MEDICINE

## 2020-01-08 PROCEDURE — 87040 BLOOD CULTURE FOR BACTERIA: CPT | Performed by: EMERGENCY MEDICINE

## 2020-01-08 PROCEDURE — 93005 ELECTROCARDIOGRAM TRACING: CPT | Performed by: EMERGENCY MEDICINE

## 2020-01-08 PROCEDURE — 36415 COLL VENOUS BLD VENIPUNCTURE: CPT | Performed by: EMERGENCY MEDICINE

## 2020-01-08 PROCEDURE — 93010 ELECTROCARDIOGRAM REPORT: CPT | Performed by: INTERNAL MEDICINE

## 2020-01-08 PROCEDURE — 87150 DNA/RNA AMPLIFIED PROBE: CPT | Performed by: EMERGENCY MEDICINE

## 2020-01-08 PROCEDURE — 83605 ASSAY OF LACTIC ACID: CPT | Performed by: EMERGENCY MEDICINE

## 2020-01-08 RX ORDER — POTASSIUM CHLORIDE 1.5 G/1.77G
40 POWDER, FOR SOLUTION ORAL AS NEEDED
Status: DISCONTINUED | OUTPATIENT
Start: 2020-01-08 | End: 2020-01-16 | Stop reason: HOSPADM

## 2020-01-08 RX ORDER — CEFTRIAXONE SODIUM 1 G/50ML
1 INJECTION, SOLUTION INTRAVENOUS EVERY 24 HOURS
Status: DISCONTINUED | OUTPATIENT
Start: 2020-01-09 | End: 2020-01-09

## 2020-01-08 RX ORDER — ACETAMINOPHEN 650 MG/1
650 SUPPOSITORY RECTAL EVERY 4 HOURS PRN
Status: DISCONTINUED | OUTPATIENT
Start: 2020-01-08 | End: 2020-01-16 | Stop reason: HOSPADM

## 2020-01-08 RX ORDER — ACETAMINOPHEN 325 MG/1
650 TABLET ORAL EVERY 4 HOURS PRN
Status: DISCONTINUED | OUTPATIENT
Start: 2020-01-08 | End: 2020-01-16 | Stop reason: HOSPADM

## 2020-01-08 RX ORDER — SODIUM CHLORIDE 0.9 % (FLUSH) 0.9 %
10 SYRINGE (ML) INJECTION AS NEEDED
Status: DISCONTINUED | OUTPATIENT
Start: 2020-01-08 | End: 2020-01-16 | Stop reason: HOSPADM

## 2020-01-08 RX ORDER — CEFTRIAXONE SODIUM 2 G/50ML
2 INJECTION, SOLUTION INTRAVENOUS ONCE
Status: COMPLETED | OUTPATIENT
Start: 2020-01-08 | End: 2020-01-08

## 2020-01-08 RX ORDER — POTASSIUM CHLORIDE 7.45 MG/ML
10 INJECTION INTRAVENOUS
Status: DISCONTINUED | OUTPATIENT
Start: 2020-01-08 | End: 2020-01-16 | Stop reason: HOSPADM

## 2020-01-08 RX ORDER — NICOTINE POLACRILEX 4 MG
15 LOZENGE BUCCAL
Status: DISCONTINUED | OUTPATIENT
Start: 2020-01-08 | End: 2020-01-16 | Stop reason: HOSPADM

## 2020-01-08 RX ORDER — ONDANSETRON 2 MG/ML
4 INJECTION INTRAMUSCULAR; INTRAVENOUS EVERY 6 HOURS PRN
Status: DISCONTINUED | OUTPATIENT
Start: 2020-01-08 | End: 2020-01-16 | Stop reason: HOSPADM

## 2020-01-08 RX ORDER — ACETAMINOPHEN 160 MG/5ML
650 SOLUTION ORAL EVERY 4 HOURS PRN
Status: DISCONTINUED | OUTPATIENT
Start: 2020-01-08 | End: 2020-01-16 | Stop reason: HOSPADM

## 2020-01-08 RX ORDER — SODIUM CHLORIDE 0.9 % (FLUSH) 0.9 %
10 SYRINGE (ML) INJECTION EVERY 12 HOURS SCHEDULED
Status: DISCONTINUED | OUTPATIENT
Start: 2020-01-08 | End: 2020-01-16 | Stop reason: HOSPADM

## 2020-01-08 RX ORDER — CEFTRIAXONE SODIUM 1 G/50ML
1 INJECTION, SOLUTION INTRAVENOUS EVERY 24 HOURS
Status: DISCONTINUED | OUTPATIENT
Start: 2020-01-09 | End: 2020-01-08

## 2020-01-08 RX ORDER — SODIUM CHLORIDE AND POTASSIUM CHLORIDE 150; 900 MG/100ML; MG/100ML
75 INJECTION, SOLUTION INTRAVENOUS CONTINUOUS
Status: DISCONTINUED | OUTPATIENT
Start: 2020-01-08 | End: 2020-01-13

## 2020-01-08 RX ORDER — IPRATROPIUM BROMIDE AND ALBUTEROL SULFATE 2.5; .5 MG/3ML; MG/3ML
3 SOLUTION RESPIRATORY (INHALATION) EVERY 4 HOURS PRN
Status: DISCONTINUED | OUTPATIENT
Start: 2020-01-08 | End: 2020-01-16 | Stop reason: HOSPADM

## 2020-01-08 RX ORDER — DEXTROSE MONOHYDRATE 25 G/50ML
25 INJECTION, SOLUTION INTRAVENOUS
Status: DISCONTINUED | OUTPATIENT
Start: 2020-01-08 | End: 2020-01-16 | Stop reason: HOSPADM

## 2020-01-08 RX ORDER — POTASSIUM CHLORIDE 750 MG/1
40 CAPSULE, EXTENDED RELEASE ORAL AS NEEDED
Status: DISCONTINUED | OUTPATIENT
Start: 2020-01-08 | End: 2020-01-16 | Stop reason: HOSPADM

## 2020-01-08 RX ORDER — POTASSIUM CHLORIDE 7.45 MG/ML
10 INJECTION INTRAVENOUS ONCE
Status: COMPLETED | OUTPATIENT
Start: 2020-01-08 | End: 2020-01-08

## 2020-01-08 RX ORDER — NITROGLYCERIN 0.4 MG/1
0.4 TABLET SUBLINGUAL
Status: DISCONTINUED | OUTPATIENT
Start: 2020-01-08 | End: 2020-01-16 | Stop reason: HOSPADM

## 2020-01-08 RX ADMIN — SODIUM CHLORIDE, PRESERVATIVE FREE 10 ML: 5 INJECTION INTRAVENOUS at 16:56

## 2020-01-08 RX ADMIN — CEFTRIAXONE SODIUM 2 G: 2 INJECTION, SOLUTION INTRAVENOUS at 13:39

## 2020-01-08 RX ADMIN — SODIUM CHLORIDE, POTASSIUM CHLORIDE, SODIUM LACTATE AND CALCIUM CHLORIDE 1000 ML: 600; 310; 30; 20 INJECTION, SOLUTION INTRAVENOUS at 12:26

## 2020-01-08 RX ADMIN — AZITHROMYCIN MONOHYDRATE 500 MG: 500 INJECTION, POWDER, LYOPHILIZED, FOR SOLUTION INTRAVENOUS at 16:01

## 2020-01-08 RX ADMIN — SODIUM CHLORIDE, PRESERVATIVE FREE 10 ML: 5 INJECTION INTRAVENOUS at 21:34

## 2020-01-08 RX ADMIN — POTASSIUM CHLORIDE 10 MEQ: 7.46 INJECTION, SOLUTION INTRAVENOUS at 13:42

## 2020-01-08 RX ADMIN — POTASSIUM CHLORIDE AND SODIUM CHLORIDE 125 ML/HR: 900; 150 INJECTION, SOLUTION INTRAVENOUS at 18:51

## 2020-01-08 NOTE — ED PROVIDER NOTES
EMERGENCY DEPARTMENT ENCOUNTER    CHIEF COMPLAINT  Chief Complaint: Back pain  History given by: Patient and family   History limited by: Pt is a poor historian   Room Number: 39/39  PMD: Magali Burgess MD      HPI:   used to obtain information for HPI    Pt is a 79 y.o. male who presents via EMS from home complaining of acute on chronic lower back pain. Pt is also c/o difficulty urinating and generalized weakness. Pt denies fever and chills. Per note from family, pt lowered himself onto the floor today and was unable to get back up. Family reports calling EMS when they found him on the floor this morning. Pt denies being on medication for chronic back pain. Pt family reports pt is not compliant. EMS reports pt was covered in urine when they arrived to transport him this morning. Pt reports he lives on his own but family stops by frequently.       Duration:  Chronic   Onset: Gradual  Timing: Constant   Location: Lower back   Radiation: None  Quality: Pain  Intensity/Severity: Moderate  Progression: Worsening   Associated Symptoms: Generalized weakness, difficulty urinating   Aggravating Factors: None  Alleviating Factors: None  Previous Episodes: Chronic pain   Treatment before arrival: None    PAST MEDICAL HISTORY  Active Ambulatory Problems     Diagnosis Date Noted   • Acute pyelonephritis 05/13/2019   • HTN (hypertension) 05/13/2019   • DM2 (diabetes mellitus, type 2) (CMS/HCC) 05/13/2019   • Elevated alkaline phosphatase level 05/14/2019   • Aortic stenosis 05/15/2019   • Acute UTI 06/12/2019   • Wandering atrial pacemaker 06/13/2019   • Elevated troponin 06/13/2019     Resolved Ambulatory Problems     Diagnosis Date Noted   • Hypokalemia 05/13/2019     Past Medical History:   Diagnosis Date   • Diabetes mellitus (CMS/HCC)    • Hypertension        PAST SURGICAL HISTORY  Past Surgical History:   Procedure Laterality Date   • CATARACT EXTRACTION, BILATERAL         FAMILY HISTORY  No family  history on file.    SOCIAL HISTORY  Social History     Socioeconomic History   • Marital status:      Spouse name: Not on file   • Number of children: Not on file   • Years of education: Not on file   • Highest education level: Not on file   Tobacco Use   • Smoking status: Never Smoker   • Smokeless tobacco: Never Used   Substance and Sexual Activity   • Alcohol use: No     Frequency: Never   • Drug use: No   • Sexual activity: Defer       ALLERGIES  Patient has no known allergies.    REVIEW OF SYSTEMS  Review of Systems   Unable to perform ROS: Other       PHYSICAL EXAM  ED Triage Vitals   Temp Heart Rate Resp BP SpO2   01/08/20 1138 01/08/20 1136 01/08/20 1136 01/08/20 1136 01/08/20 1136   97.9 °F (36.6 °C) 96 16 160/84 98 %      Temp src Heart Rate Source Patient Position BP Location FiO2 (%)   01/08/20 1138 01/08/20 1136 01/08/20 1136 -- --   Tympanic Monitor Lying         Physical Exam   Constitutional: No distress.   HENT:   Head: Normocephalic and atraumatic.   Oropharynx dry   Eyes: Pupils are equal, round, and reactive to light. EOM are normal.   Neck: Normal range of motion. Neck supple.   Cardiovascular: Normal rate and regular rhythm.   Murmur heard.   Systolic (loud) murmur is present.  Pulmonary/Chest: Effort normal and breath sounds normal. No respiratory distress. He has no decreased breath sounds. He has no wheezes. He has no rhonchi. He has no rales.   Abdominal: Soft. He exhibits no distension and no mass. There is no tenderness. There is no rebound and no guarding.   Musculoskeletal: Normal range of motion. He exhibits no edema.   Neurological: He is alert. He has normal sensation and normal strength.   Oriented x 2    Skin: Skin is warm and dry.   Skin warm and dry, good pulses   Psychiatric: Mood and affect normal.   Nursing note and vitals reviewed.      LAB RESULTS  Lab Results (last 24 hours)     Procedure Component Value Units Date/Time    CBC & Differential [[id number]] Collected:   01/08/20 1223    Specimen:  Blood Updated:  01/08/20 1238    Narrative:       The following orders were created for panel order CBC & Differential.  Procedure                               Abnormality         Status                     ---------                               -----------         ------                     CBC Auto Differential[[id number]]        Abnormal            Final result                 Please view results for these tests on the individual orders.    Comprehensive Metabolic Panel [[id number]]  (Abnormal) Collected:  01/08/20 1223    Specimen:  Blood Updated:  01/08/20 1301     Glucose 131 mg/dL      BUN 15 mg/dL      Creatinine 0.71 mg/dL      Sodium 144 mmol/L      Potassium 2.7 mmol/L      Chloride 102 mmol/L      CO2 26.3 mmol/L      Calcium 8.5 mg/dL      Total Protein 6.4 g/dL      Albumin 2.70 g/dL      ALT (SGPT) 8 U/L      AST (SGOT) 16 U/L      Alkaline Phosphatase 154 U/L      Total Bilirubin 0.8 mg/dL      eGFR Non African Amer 107 mL/min/1.73      eGFR  African Amer 130 mL/min/1.73      Globulin 3.7 gm/dL      A/G Ratio 0.7 g/dL      BUN/Creatinine Ratio 21.1     Anion Gap 15.7 mmol/L     Narrative:       GFR Normal >60  Chronic Kidney Disease <60  Kidney Failure <15      Protime-INR [[id number]]  (Abnormal) Collected:  01/08/20 1223    Specimen:  Blood Updated:  01/08/20 1248     Protime 14.5 Seconds      INR 1.16    aPTT [[id number]]  (Normal) Collected:  01/08/20 1223    Specimen:  Blood Updated:  01/08/20 1248     PTT 27.4 seconds     Troponin [[id number]]  (Abnormal) Collected:  01/08/20 1223    Specimen:  Blood Updated:  01/08/20 1303     Troponin T 0.040 ng/mL     Narrative:       Troponin T Reference Range:  <= 0.03 ng/mL-   Negative for AMI  >0.03 ng/mL-     Abnormal for myocardial necrosis.  Clinicians would have to utilize clinical acumen, EKG, Troponin and serial changes to determine if it is an Acute Myocardial Infarction or myocardial injury due to an underlying chronic  condition.       Results may be falsely decreased if patient taking Biotin.      Lactic Acid, Plasma [[id number]]  (Abnormal) Collected:  01/08/20 1223    Specimen:  Blood Updated:  01/08/20 1304     Lactate 2.5 mmol/L     Procalcitonin [[id number]]  (Abnormal) Collected:  01/08/20 1223    Specimen:  Blood Updated:  01/08/20 1306     Procalcitonin 0.42 ng/mL     Narrative:       Results may be falsely decreased if patient taking Biotin.     CK [[id number]]  (Abnormal) Collected:  01/08/20 1223    Specimen:  Blood Updated:  01/08/20 1301     Creatine Kinase 205 U/L     Magnesium [[id number]]  (Normal) Collected:  01/08/20 1223    Specimen:  Blood Updated:  01/08/20 1301     Magnesium 1.6 mg/dL     CBC Auto Differential [[id number]]  (Abnormal) Collected:  01/08/20 1223    Specimen:  Blood Updated:  01/08/20 1238     WBC 27.53 10*3/mm3      RBC 4.96 10*6/mm3      Hemoglobin 14.7 g/dL      Hematocrit 43.8 %      MCV 88.3 fL      MCH 29.6 pg      MCHC 33.6 g/dL      RDW 12.8 %      RDW-SD 41.3 fl      MPV 8.7 fL      Platelets 448 10*3/mm3      Neutrophil % 89.3 %      Lymphocyte % 3.8 %      Monocyte % 5.8 %      Eosinophil % 0.0 %      Basophil % 0.3 %      Immature Grans % 0.8 %      Neutrophils, Absolute 24.61 10*3/mm3      Lymphocytes, Absolute 1.05 10*3/mm3      Monocytes, Absolute 1.59 10*3/mm3      Eosinophils, Absolute 0.00 10*3/mm3      Basophils, Absolute 0.07 10*3/mm3      Immature Grans, Absolute 0.21 10*3/mm3      nRBC 0.0 /100 WBC     Lactic Acid, Reflex Timer (This will reflex a repeat order 3-3:15 hours after ordered.) [[id number]] Collected:  01/08/20 1223    Specimen:  Blood Updated:  01/08/20 1304    Blood Culture - Blood, Arm, Left [[id number]] Collected:  01/08/20 1237    Specimen:  Blood from Arm, Left Updated:  01/08/20 1240    Blood Culture - Blood, Arm, Left [[id number]] Collected:  01/08/20 1316    Specimen:  Blood from Arm, Left Updated:  01/08/20 1319    Urinalysis With Culture If Indicated - Urine,  Catheter [[id number]]  (Abnormal) Collected:  01/08/20 1349    Specimen:  Urine, Catheter Updated:  01/08/20 1432     Color, UA Dark Yellow     Appearance, UA Cloudy     pH, UA 5.5     Specific Gravity, UA 1.025     Glucose, UA Negative     Ketones, UA Trace     Bilirubin, UA Negative     Blood, UA Small (1+)     Protein, UA 30 mg/dL (1+)     Leuk Esterase, UA Small (1+)     Nitrite, UA Negative     Urobilinogen, UA 2.0 E.U./dL    Urinalysis, Microscopic Only - Urine, Catheter [[id number]] Collected:  01/08/20 1349    Specimen:  Urine, Catheter Updated:  01/08/20 1407          I ordered the above labs and reviewed the results    RADIOLOGY  XR Chest 1 View   Final Result   FINDINGS AND IMPRESSION:   Asymmetric pulmonary opacification is present within the left base.   Findings are concerning for pneumonia in the appropriate clinical   context and correlation with patient history is recommended. No pleural   effusion or pneumothorax is seen. The heart is normal in size.       This report was finalized on 1/8/2020 1:08 PM by Dr. Parker Roe M.D.               I ordered the above noted radiological studies. Interpreted by radiologist. Reviewed by me in PACS.       PROCEDURES  Procedures    EKG          EKG time: 1209  Rhythm/Rate: Afib, rate 100  P waves and PA: N/a  QRS, axis: Nml axis, poor R wave progression anteriorly    ST and T waves: Nonspecific st and t wave changes      Interpreted Contemporaneously by me, independently viewed  Unchanged compared to prior 6/12/19      PROGRESS AND CONSULTS  ED Course as of Jan 08 1443   Wed Jan 08, 2020   1329 Feel the elevated Troponin is due to sepsis and it has been elevated in the past on prior admission for infection.         [JG]   1431 Feel patient too ill to transfer.     [JG]      ED Course User Index  [JG] Keon Bourgeois MD       1:40 PM: Rechecked pt who is resting comfortably and in NAD. Daughter now at bedside. Informed pt and daughter of XR chest which showed  PNA. Discussed plan to admit. Pt understands and agrees with the plan, all questions answered.    2:33 PM: Discussed pt with CCP nurse Ricarda Botello. She is going to speak with family when they return to bedside.     2:39 PM: Spoke with COMPA Bobby who agreed to admit to  ([facility])    MEDICAL DECISION MAKING  Results were reviewed/discussed with the patient and they were also made aware of online access. Pt also made aware that some labs, such as cultures, will not be resulted during ER visit and follow up with PMD is necessary.     MDM  Number of Diagnoses or Management Options  Atrial fibrillation, unspecified type (CMS/HCC):   Pneumonia of left lower lobe due to infectious organism (CMS/HCC):   Sepsis without acute organ dysfunction, due to unspecified organism (CMS/HCC):      Amount and/or Complexity of Data Reviewed  Clinical lab tests: ordered and reviewed (Lactate 2.5, troponin 0.040, WBC 27.53)  Tests in the radiology section of CPT®: ordered and reviewed (XR chest showed asymmetric pulmonary opacification is present within the left base)  Tests in the medicine section of CPT®: ordered and reviewed (See procedure note)  Decide to obtain previous medical records or to obtain history from someone other than the patient: yes (Epic)  Review and summarize past medical records: yes (Pt was hospitalized in June 2019 for pyelonephritis and was admitted at [facility] on 11/6/19 for hypokalemia.  )  Discuss the patient with other providers: yes (COMPA Bobby ([facility]))  Independent visualization of images, tracings, or specimens: yes    Patient Progress  Patient progress: stable         DIAGNOSIS  Final diagnoses:   Sepsis without acute organ dysfunction, due to unspecified organism (CMS/HCC)   Pneumonia of left lower lobe due to infectious organism (CMS/HCC)   Atrial fibrillation, unspecified type (CMS/HCC)       DISPOSITION  ADMISSION TO TELEMETRY     Discussed treatment plan and reason for admission with  pt/family and admitting physician.  Pt/family voiced understanding of the plan for admission for further testing/treatment as needed.         Latest Documented Vital Signs:  As of 2:43 PM  BP- (!) 178/104 HR- 99 Temp- 97.9 °F (36.6 °C) (Tympanic) O2 sat- 98%    --  Documentation assistance provided by sita Obregon for .  Information recorded by the scribe was done at my direction and has been verified and validated by me.                                Ayde Obregon  01/08/20 7324       Keon Bourgeois MD  01/08/20 6433

## 2020-01-08 NOTE — PLAN OF CARE
"Pt arrived from ER from home. Pt was at home and fell and was not found until this morning. Pt daughter states he lives with his wife in the ER report it was given that pt lives alone and that family members go over and care for him. Daughter gave most of his information and stated \" he has not taken any meds in over  2 months and is a diabetic. Pt has some pressure injury and pics taken and in the chart will cont to monitor.  "

## 2020-01-08 NOTE — H&P
Patient Name:  Raymon Solis  YOB: 1940  MRN:  [id number]  Admit Date:  1/8/2020  Patient Care Team:  Magali Burgess MD as PCP - General (Internal Medicine)      Subjective   History Present Illness     Chief Complaint   Patient presents with   • Weakness - Generalized       Mr. Solis is a 79 y.o. non-smoker with a history of medical noncompliance, HTN, DM 2, aortic stenosis, troponin elevation that presents to [facility] complaining of weakness.  The patient is a poor historian and no family is present at bedside.  The  has difficulty understanding the patient rendering exam to yes and no questions only. Patient lives alone but his family checks on him frequently.  He was weak yesterday and lowered himself to the floor but was found by family this morning who called EMS.  He denies cough but has mild shortness of breath and chest pain worse with breathing.  He has rhinorrhea with weakness and chills but no known fever.  He has lower abdominal pain, burning with urination and hesitancy.  He denies chest pain, palpitations, dizziness, lightheadedness.  He has not taken all her some of his medications in an unknown period of time. His appetite is poor.       History of Present Illness  Review of Systems   Constitutional: Positive for appetite change, chills and fatigue.   HENT: Positive for rhinorrhea. Negative for sinus pain and trouble swallowing.    Eyes: Negative for visual disturbance.   Respiratory: Positive for chest tightness and shortness of breath. Negative for cough and choking.    Cardiovascular: Negative for chest pain, palpitations and leg swelling.   Gastrointestinal: Negative for abdominal distention, abdominal pain, blood in stool, constipation, diarrhea, nausea and vomiting.   Genitourinary: Positive for decreased urine volume and dysuria.   Musculoskeletal: Negative for back pain.   Skin: Negative for pallor.   Allergic/Immunologic: Negative  for immunocompromised state.   Neurological: Negative for dizziness.   Hematological: Does not bruise/bleed easily.   Psychiatric/Behavioral: Positive for confusion.      ROS difficult to obtain despite .     Personal History     Past Medical History:   Diagnosis Date   • Aortic stenosis    • Diabetes mellitus (CMS/HCC)    • Hypertension      Past Surgical History:   Procedure Laterality Date   • CATARACT EXTRACTION, BILATERAL       No family history on file.  Social History     Tobacco Use   • Smoking status: Never Smoker   • Smokeless tobacco: Never Used   Substance Use Topics   • Alcohol use: No     Frequency: Never   • Drug use: No     No current facility-administered medications on file prior to encounter.      Current Outpatient Medications on File Prior to Encounter   Medication Sig Dispense Refill   • amLODIPine (NORVASC) 5 MG tablet Take 1 tablet by mouth Daily. 30 tablet 0   • aspirin 81 MG EC tablet Take 81 mg by mouth Daily.     • brimonidine (ALPHAGAN) 0.2 % ophthalmic solution Administer 1 drop to both eyes 3 (Three) Times a Day.     • cefdinir (OMNICEF) 300 MG capsule Take 1 capsule by mouth 2 (Two) Times a Day. 20 capsule 0   • furosemide (LASIX) 40 MG tablet Take 0.5 tablets by mouth Daily.     • glipiZIDE (GLUCOTROL) 10 MG tablet Take 10 mg by mouth 2 (Two) Times a Day Before Meals.     • latanoprost (XALATAN) 0.005 % ophthalmic solution Administer 1 drop to both eyes Every Night.     • lisinopril (PRINIVIL,ZESTRIL) 10 MG tablet Take 1 tablet by mouth Daily. 30 tablet 0   • metFORMIN (GLUCOPHAGE) 1000 MG tablet Take 1,000 mg by mouth 2 (Two) Times a Day With Meals.     • potassium chloride (K-DUR) 10 MEQ CR tablet Take 1 tablet by mouth Daily. 30 tablet 0   • tamsulosin (FLOMAX) 0.4 MG capsule 24 hr capsule Take 1 capsule by mouth Every Night. 30 capsule 0     No Known Allergies    Objective    Objective     Vital Signs  Temp:  [97.9 °F (36.6 °C)] 97.9 °F (36.6 °C)  Heart Rate:  [96-99]  99  Resp:  [16] 16  BP: (160-178)/() 178/104  SpO2:  [98 %] 98 %  on   ;   Device (Oxygen Therapy): room air  There is no height or weight on file to calculate BMI.    Physical Exam   Constitutional: He appears well-developed.   Thin, frail, unkempt, smells strongly of urine.    HENT:   Head: Normocephalic and atraumatic.   Dry oropharynx   Eyes: Conjunctivae and EOM are normal. No scleral icterus.   Neck: Normal range of motion. No JVD present. No tracheal deviation present.   Cardiovascular: Normal rate, regular rhythm and intact distal pulses.   Murmur heard.  Mild tachycardia   Pulmonary/Chest: Effort normal. No accessory muscle usage. No respiratory distress. He has decreased breath sounds. He has no rhonchi. He has no rales.   Abdominal: Soft. Bowel sounds are normal. He exhibits no distension and no mass. There is tenderness in the suprapubic area. There is no rebound and no guarding.   Musculoskeletal: Normal range of motion.   Neurological: He is alert. No cranial nerve deficit.   Oriented to person, hospital but confused to situation   Skin: Skin is warm and dry. Capillary refill takes 2 to 3 seconds. He is not diaphoretic.   Decreased skin turgor   Psychiatric: Cognition and memory are impaired.   Flat, withdrawn, per  his speech is mumbled   Nursing note and vitals reviewed.      Results Review:  I reviewed the patient's new clinical results.  I reviewed the patient's new imaging results and agree with the interpretation.  I reviewed the patient's other test results and agree with the interpretation  I personally viewed and interpreted the patient's EKG/Telemetry data  Discussed with ED provider.    Lab Results (last 24 hours)     Procedure Component Value Units Date/Time    CBC & Differential [[id number]] Collected:  01/08/20 1223    Specimen:  Blood Updated:  01/08/20 1238    Narrative:       The following orders were created for panel order CBC & Differential.  Procedure                                Abnormality         Status                     ---------                               -----------         ------                     CBC Auto Differential[[id number]]        Abnormal            Final result                 Please view results for these tests on the individual orders.    Comprehensive Metabolic Panel [[id number]]  (Abnormal) Collected:  01/08/20 1223    Specimen:  Blood Updated:  01/08/20 1301     Glucose 131 mg/dL      BUN 15 mg/dL      Creatinine 0.71 mg/dL      Sodium 144 mmol/L      Potassium 2.7 mmol/L      Chloride 102 mmol/L      CO2 26.3 mmol/L      Calcium 8.5 mg/dL      Total Protein 6.4 g/dL      Albumin 2.70 g/dL      ALT (SGPT) 8 U/L      AST (SGOT) 16 U/L      Alkaline Phosphatase 154 U/L      Total Bilirubin 0.8 mg/dL      eGFR Non African Amer 107 mL/min/1.73      eGFR  African Amer 130 mL/min/1.73      Globulin 3.7 gm/dL      A/G Ratio 0.7 g/dL      BUN/Creatinine Ratio 21.1     Anion Gap 15.7 mmol/L     Narrative:       GFR Normal >60  Chronic Kidney Disease <60  Kidney Failure <15      Protime-INR [[id number]]  (Abnormal) Collected:  01/08/20 1223    Specimen:  Blood Updated:  01/08/20 1248     Protime 14.5 Seconds      INR 1.16    aPTT [[id number]]  (Normal) Collected:  01/08/20 1223    Specimen:  Blood Updated:  01/08/20 1248     PTT 27.4 seconds     Troponin [[id number]]  (Abnormal) Collected:  01/08/20 1223    Specimen:  Blood Updated:  01/08/20 1303     Troponin T 0.040 ng/mL     Narrative:       Troponin T Reference Range:  <= 0.03 ng/mL-   Negative for AMI  >0.03 ng/mL-     Abnormal for myocardial necrosis.  Clinicians would have to utilize clinical acumen, EKG, Troponin and serial changes to determine if it is an Acute Myocardial Infarction or myocardial injury due to an underlying chronic condition.       Results may be falsely decreased if patient taking Biotin.      Lactic Acid, Plasma [[id number]]  (Abnormal) Collected:  01/08/20 1223    Specimen:  Blood  Updated:  01/08/20 1304     Lactate 2.5 mmol/L     Procalcitonin [[id number]]  (Abnormal) Collected:  01/08/20 1223    Specimen:  Blood Updated:  01/08/20 1306     Procalcitonin 0.42 ng/mL     Narrative:       Results may be falsely decreased if patient taking Biotin.     CK [[id number]]  (Abnormal) Collected:  01/08/20 1223    Specimen:  Blood Updated:  01/08/20 1301     Creatine Kinase 205 U/L     Magnesium [[id number]]  (Normal) Collected:  01/08/20 1223    Specimen:  Blood Updated:  01/08/20 1301     Magnesium 1.6 mg/dL     CBC Auto Differential [[id number]]  (Abnormal) Collected:  01/08/20 1223    Specimen:  Blood Updated:  01/08/20 1238     WBC 27.53 10*3/mm3      RBC 4.96 10*6/mm3      Hemoglobin 14.7 g/dL      Hematocrit 43.8 %      MCV 88.3 fL      MCH 29.6 pg      MCHC 33.6 g/dL      RDW 12.8 %      RDW-SD 41.3 fl      MPV 8.7 fL      Platelets 448 10*3/mm3      Neutrophil % 89.3 %      Lymphocyte % 3.8 %      Monocyte % 5.8 %      Eosinophil % 0.0 %      Basophil % 0.3 %      Immature Grans % 0.8 %      Neutrophils, Absolute 24.61 10*3/mm3      Lymphocytes, Absolute 1.05 10*3/mm3      Monocytes, Absolute 1.59 10*3/mm3      Eosinophils, Absolute 0.00 10*3/mm3      Basophils, Absolute 0.07 10*3/mm3      Immature Grans, Absolute 0.21 10*3/mm3      nRBC 0.0 /100 WBC     Lactic Acid, Reflex Timer (This will reflex a repeat order 3-3:15 hours after ordered.) [[id number]] Collected:  01/08/20 1223    Specimen:  Blood Updated:  01/08/20 1304    Blood Culture - Blood, Arm, Left [[id number]] Collected:  01/08/20 1237    Specimen:  Blood from Arm, Left Updated:  01/08/20 1240    Blood Culture - Blood, Arm, Left [[id number]] Collected:  01/08/20 1316    Specimen:  Blood from Arm, Left Updated:  01/08/20 1319    Urinalysis With Culture If Indicated - Urine, Catheter [[id number]]  (Abnormal) Collected:  01/08/20 1349    Specimen:  Urine, Catheter Updated:  01/08/20 1432     Color, UA Dark Yellow     Appearance, UA Cloudy      pH, UA 5.5     Specific Gravity, UA 1.025     Glucose, UA Negative     Ketones, UA Trace     Bilirubin, UA Negative     Blood, UA Small (1+)     Protein, UA 30 mg/dL (1+)     Leuk Esterase, UA Small (1+)     Nitrite, UA Negative     Urobilinogen, UA 2.0 E.U./dL    Urinalysis, Microscopic Only - Urine, Catheter [[id number]]  (Abnormal) Collected:  01/08/20 1349    Specimen:  Urine, Catheter Updated:  01/08/20 1445     RBC, UA 3-5 /HPF      WBC, UA 13-20 /HPF      Bacteria, UA 4+ /HPF      Squamous Epithelial Cells, UA 3-6 /HPF      Hyaline Casts, UA 0-2 /LPF      Methodology Automated Microscopy    Urine Culture - Urine, Urine, Catheter [[id number]] Collected:  01/08/20 1349    Specimen:  Urine, Catheter Updated:  01/08/20 1445          Imaging Results (Last 24 Hours)     Procedure Component Value Units Date/Time    XR Chest 1 View [[id number]] Collected:  01/08/20 1305     Updated:  01/08/20 1311    Narrative:       Portable chest radiograph     HISTORY:Weakness     TECHNIQUE: Single AP portable radiograph of the chest     COMPARISON:Chest radiograph 10/21/2007       Impression:       FINDINGS AND IMPRESSION:  Asymmetric pulmonary opacification is present within the left base.  Findings are concerning for pneumonia in the appropriate clinical  context and correlation with patient history is recommended. No pleural  effusion or pneumothorax is seen. The heart is normal in size.     This report was finalized on 1/8/2020 1:08 PM by Dr. Parker Roe M.D.             Results for orders placed during the hospital encounter of 05/13/19   Adult Transthoracic Echo Complete W/ Cont if Necessary Per Protocol    Narrative · Left ventricular diastolic dysfunction (grade I) consistent with   impaired relaxation.  · Estimated EF = 73%.  · Left ventricular systolic function is hyperdynamic (EF > 70).  · Left atrial cavity size is mildly dilated.  · Moderate aortic valve stenosis is present.  · Aortic valve maximum pressure gradient  is 51.0 mmHg.  · Aortic valve mean pressure gradient is 28.0 mmHg.  · Aortic valve area is 1.0 cm2.  · There is moderate calcification of the aortic valve.          ECG 12 Lead   Preliminary Result   HEART RATE= 101  bpm   RR Interval= 594  ms   VT Interval=   ms   P Horizontal Axis=   deg   P Front Axis=   deg   QRSD Interval= 110  ms   QT Interval= 376  ms   QRS Axis= 24  deg   T Wave Axis= 46  deg   - ABNORMAL ECG -   Atrial fibrillation   Ventricular premature complex   Anterior infarct, old   Minimal ST depression, lateral leads   Electronically Signed By:    Date and Time of Study: 2020-01-08 12:09:35           Assessment/Plan     Active Hospital Problems    Diagnosis  POA   • **CAP (community acquired pneumonia) [J18.9]  Yes   • Sepsis without acute organ dysfunction (CMS/HCC) [A41.9]  Yes   • History of noncompliance with medical treatment [Z91.19]  Not Applicable   • Elevated troponin [R79.89]  Yes   • Aortic stenosis [I35.0]  Yes   • Elevated alkaline phosphatase level [R74.8]  Yes   • Type 2 diabetes mellitus, without long-term current use of insulin (CMS/HCC) [E11.9]  Yes   • HTN (hypertension) [I10]  Yes   • Hypokalemia [E87.6]  Yes      Resolved Hospital Problems   No resolved problems to display.         79 y.o. male with CAP (community acquired pneumonia).      · Admit to telemetry for CAP.  · This patient does not meet criteria for severe CAP based on 2007 ATS/IDSA guidelines.   · Azithromycin/ceftriaxone for 5-7 days based on response to treatment.   · Blood cultures obtained in ED.  Follow up results.  · Sepsis present without organ failure  · Check viral respiratory PCR to evaluate for possible viral etiology of pneumonia.  Will add Tamiflu if positive for influenza.  · There is no history of MRSA or Pseudomonas infection or hospitalization requiring IV antibiotics within last 90 days.  · Supplemental oxygen to keep SaO2 > 92%  · Bronchodilators and antitussives as needed.  · Physical therapy  consult SLP consult.   · Abnormal UA- Given vague complaints of dysuria, pelvic pain and odiferous urine on exam will treat UTI with rocephin     Hypokalemia/dehydration  · Renal function stable  · Replace K per protocol  · continue IVF    Weakness/ prolonged time on the ground/ noncompliance  · Denies fall, injury or LOC  · CK with slight elevation   · AST and ALT normal  · Myoglobin urine pending  · PT and OT consult to determine safety living independently   · Ultimately I am concerned that this patient is no longer capable of living independently and admits to not taking his medications as prescribed.     Alkaline phosphatase elevation  · Chronic, check GGT    Troponin elevated   · Chronically elevated at baseline.    · Denies chest pain without acute ischemic change on EKG    HTN   · BP elevated but patient has not been taking his antihypertensive agents  · Will continue home medications when medication reconciliation completed by nursing staff.    DM2  · Hold home oral agents  · Correctional Humalog  · Check hemoglobin A1c    · I discussed the patient's findings and my recommendations with patient, nursing staff and ED provider.    VTE Prophylaxis - SCDs.  Code Status - Full code.       COMPA Junior  [facility]  01/08/20  3:08 PM

## 2020-01-08 NOTE — ED TRIAGE NOTES
Pt arrives via EMS from home for generalized weakness. States that he laid himself on floor yesterday at 1700. Could not get himself up and his family couldn't get him up so the pt was on the floor until they called EMS today. States that he is non-compliant with all his medications. Pt complains of low back pain. EMS states that pt was covered in urine when they got to him. EMS changed pt PTA.

## 2020-01-09 ENCOUNTER — APPOINTMENT (OUTPATIENT)
Dept: ULTRASOUND IMAGING | Facility: HOSPITAL | Age: 80
End: 2020-01-09

## 2020-01-09 PROBLEM — R91.8 LEFT LOWER LOBE PULMONARY INFILTRATE: Status: ACTIVE | Noted: 2020-01-09

## 2020-01-09 PROBLEM — B96.20 BACTEREMIA DUE TO ESCHERICHIA COLI: Status: ACTIVE | Noted: 2020-01-09

## 2020-01-09 PROBLEM — N39.0 E. COLI UTI (URINARY TRACT INFECTION): Status: ACTIVE | Noted: 2020-01-09

## 2020-01-09 PROBLEM — G93.41 METABOLIC ENCEPHALOPATHY: Status: ACTIVE | Noted: 2020-01-09

## 2020-01-09 PROBLEM — B96.20 E. COLI UTI (URINARY TRACT INFECTION): Status: ACTIVE | Noted: 2020-01-09

## 2020-01-09 PROBLEM — M62.82 NON-TRAUMATIC RHABDOMYOLYSIS: Status: ACTIVE | Noted: 2020-01-09

## 2020-01-09 PROBLEM — R33.8 ACUTE URINARY RETENTION: Status: ACTIVE | Noted: 2020-01-09

## 2020-01-09 PROBLEM — R78.81 BACTEREMIA DUE TO ESCHERICHIA COLI: Status: ACTIVE | Noted: 2020-01-09

## 2020-01-09 PROBLEM — N28.9 LESION OF LEFT NATIVE KIDNEY: Status: ACTIVE | Noted: 2020-01-09

## 2020-01-09 LAB
ALBUMIN SERPL-MCNC: 2.5 G/DL (ref 3.5–5.2)
ALP SERPL-CCNC: 143 U/L (ref 39–117)
ALT SERPL W P-5'-P-CCNC: 5 U/L (ref 1–41)
ANION GAP SERPL CALCULATED.3IONS-SCNC: 12.4 MMOL/L (ref 5–15)
AST SERPL-CCNC: 14 U/L (ref 1–40)
BACTERIA BLD CULT: ABNORMAL
BILIRUB CONJ SERPL-MCNC: 0.3 MG/DL (ref 0.2–0.3)
BILIRUB INDIRECT SERPL-MCNC: 0.2 MG/DL
BILIRUB SERPL-MCNC: 0.5 MG/DL (ref 0.2–1.2)
BUN BLD-MCNC: 19 MG/DL (ref 8–23)
BUN/CREAT SERPL: 22.9 (ref 7–25)
CALCIUM SPEC-SCNC: 9.6 MG/DL (ref 8.6–10.5)
CHLORIDE SERPL-SCNC: 99 MMOL/L (ref 98–107)
CK SERPL-CCNC: 79 U/L (ref 20–200)
CO2 SERPL-SCNC: 29.6 MMOL/L (ref 22–29)
CREAT BLD-MCNC: 0.83 MG/DL (ref 0.76–1.27)
DEPRECATED RDW RBC AUTO: 39 FL (ref 37–54)
ERYTHROCYTE [DISTWIDTH] IN BLOOD BY AUTOMATED COUNT: 12.3 % (ref 12.3–15.4)
GFR SERPL CREATININE-BSD FRML MDRD: 108 ML/MIN/1.73
GFR SERPL CREATININE-BSD FRML MDRD: 89 ML/MIN/1.73
GLUCOSE BLD-MCNC: 128 MG/DL (ref 65–99)
GLUCOSE BLDC GLUCOMTR-MCNC: 112 MG/DL (ref 70–130)
GLUCOSE BLDC GLUCOMTR-MCNC: 116 MG/DL (ref 70–130)
GLUCOSE BLDC GLUCOMTR-MCNC: 130 MG/DL (ref 70–130)
GLUCOSE BLDC GLUCOMTR-MCNC: 135 MG/DL (ref 70–130)
HCT VFR BLD AUTO: 35.8 % (ref 37.5–51)
HGB BLD-MCNC: 12.3 G/DL (ref 13–17.7)
MCH RBC QN AUTO: 29.9 PG (ref 26.6–33)
MCHC RBC AUTO-ENTMCNC: 34.4 G/DL (ref 31.5–35.7)
MCV RBC AUTO: 86.9 FL (ref 79–97)
PLATELET # BLD AUTO: 389 10*3/MM3 (ref 140–450)
PMV BLD AUTO: 8.9 FL (ref 6–12)
POTASSIUM BLD-SCNC: 3.2 MMOL/L (ref 3.5–5.2)
POTASSIUM BLD-SCNC: 3.4 MMOL/L (ref 3.5–5.2)
POTASSIUM BLD-SCNC: 3.4 MMOL/L (ref 3.5–5.2)
POTASSIUM BLD-SCNC: 4.1 MMOL/L (ref 3.5–5.2)
PROT SERPL-MCNC: 5.7 G/DL (ref 6–8.5)
RBC # BLD AUTO: 4.12 10*6/MM3 (ref 4.14–5.8)
SODIUM BLD-SCNC: 141 MMOL/L (ref 136–145)
TROPONIN T SERPL-MCNC: 0.08 NG/ML (ref 0–0.03)
TROPONIN T SERPL-MCNC: 0.08 NG/ML (ref 0–0.03)
WBC NRBC COR # BLD: 18.46 10*3/MM3 (ref 3.4–10.8)

## 2020-01-09 PROCEDURE — 76775 US EXAM ABDO BACK WALL LIM: CPT

## 2020-01-09 PROCEDURE — 85027 COMPLETE CBC AUTOMATED: CPT | Performed by: NURSE PRACTITIONER

## 2020-01-09 PROCEDURE — 80076 HEPATIC FUNCTION PANEL: CPT | Performed by: NURSE PRACTITIONER

## 2020-01-09 PROCEDURE — 25810000003 SODIUM CHLORIDE 0.9 % WITH KCL 20 MEQ 20-0.9 MEQ/L-% SOLUTION: Performed by: INTERNAL MEDICINE

## 2020-01-09 PROCEDURE — 80048 BASIC METABOLIC PNL TOTAL CA: CPT | Performed by: NURSE PRACTITIONER

## 2020-01-09 PROCEDURE — 97161 PT EVAL LOW COMPLEX 20 MIN: CPT

## 2020-01-09 PROCEDURE — 92610 EVALUATE SWALLOWING FUNCTION: CPT | Performed by: SPEECH-LANGUAGE PATHOLOGIST

## 2020-01-09 PROCEDURE — 84132 ASSAY OF SERUM POTASSIUM: CPT | Performed by: HOSPITALIST

## 2020-01-09 PROCEDURE — G0008 ADMIN INFLUENZA VIRUS VAC: HCPCS | Performed by: INTERNAL MEDICINE

## 2020-01-09 PROCEDURE — 25010000003 CEFTRIAXONE PER 250 MG: Performed by: INTERNAL MEDICINE

## 2020-01-09 PROCEDURE — 87040 BLOOD CULTURE FOR BACTERIA: CPT | Performed by: INTERNAL MEDICINE

## 2020-01-09 PROCEDURE — 82550 ASSAY OF CK (CPK): CPT | Performed by: NURSE PRACTITIONER

## 2020-01-09 PROCEDURE — 84484 ASSAY OF TROPONIN QUANT: CPT | Performed by: NURSE PRACTITIONER

## 2020-01-09 PROCEDURE — 84484 ASSAY OF TROPONIN QUANT: CPT | Performed by: INTERNAL MEDICINE

## 2020-01-09 PROCEDURE — 90686 IIV4 VACC NO PRSV 0.5 ML IM: CPT | Performed by: INTERNAL MEDICINE

## 2020-01-09 PROCEDURE — 97166 OT EVAL MOD COMPLEX 45 MIN: CPT

## 2020-01-09 PROCEDURE — 82962 GLUCOSE BLOOD TEST: CPT

## 2020-01-09 PROCEDURE — 97535 SELF CARE MNGMENT TRAINING: CPT

## 2020-01-09 PROCEDURE — 99223 1ST HOSP IP/OBS HIGH 75: CPT | Performed by: INTERNAL MEDICINE

## 2020-01-09 PROCEDURE — 25010000002 INFLUENZA VAC SPLIT QUAD 0.5 ML SUSPENSION PREFILLED SYRINGE: Performed by: INTERNAL MEDICINE

## 2020-01-09 RX ORDER — BRIMONIDINE TARTRATE 2 MG/ML
1 SOLUTION/ DROPS OPHTHALMIC 3 TIMES DAILY
Status: DISCONTINUED | OUTPATIENT
Start: 2020-01-09 | End: 2020-01-16 | Stop reason: HOSPADM

## 2020-01-09 RX ORDER — LATANOPROST 50 UG/ML
1 SOLUTION/ DROPS OPHTHALMIC NIGHTLY
Status: DISCONTINUED | OUTPATIENT
Start: 2020-01-09 | End: 2020-01-16 | Stop reason: HOSPADM

## 2020-01-09 RX ORDER — ASPIRIN 81 MG/1
81 TABLET ORAL DAILY
Status: DISCONTINUED | OUTPATIENT
Start: 2020-01-09 | End: 2020-01-16 | Stop reason: HOSPADM

## 2020-01-09 RX ORDER — TAMSULOSIN HYDROCHLORIDE 0.4 MG/1
0.4 CAPSULE ORAL NIGHTLY
Status: DISCONTINUED | OUTPATIENT
Start: 2020-01-09 | End: 2020-01-10

## 2020-01-09 RX ORDER — CEFTRIAXONE SODIUM 2 G/50ML
2 INJECTION, SOLUTION INTRAVENOUS EVERY 24 HOURS
Status: DISCONTINUED | OUTPATIENT
Start: 2020-01-09 | End: 2020-01-16 | Stop reason: HOSPADM

## 2020-01-09 RX ORDER — PETROLATUM 420 MG/G
OINTMENT TOPICAL EVERY 12 HOURS SCHEDULED
Status: DISCONTINUED | OUTPATIENT
Start: 2020-01-09 | End: 2020-01-16 | Stop reason: HOSPADM

## 2020-01-09 RX ADMIN — TAMSULOSIN HYDROCHLORIDE 0.4 MG: 0.4 CAPSULE ORAL at 21:40

## 2020-01-09 RX ADMIN — POTASSIUM CHLORIDE 40 MEQ: 750 CAPSULE, EXTENDED RELEASE ORAL at 04:58

## 2020-01-09 RX ADMIN — POTASSIUM CHLORIDE AND SODIUM CHLORIDE 75 ML/HR: 900; 150 INJECTION, SOLUTION INTRAVENOUS at 19:34

## 2020-01-09 RX ADMIN — SODIUM CHLORIDE, PRESERVATIVE FREE 10 ML: 5 INJECTION INTRAVENOUS at 21:40

## 2020-01-09 RX ADMIN — BRIMONIDINE TARTRATE 1 DROP: 2 SOLUTION OPHTHALMIC at 16:35

## 2020-01-09 RX ADMIN — POTASSIUM CHLORIDE 40 MEQ: 750 CAPSULE, EXTENDED RELEASE ORAL at 11:41

## 2020-01-09 RX ADMIN — ASPIRIN 81 MG: 81 TABLET, COATED ORAL at 11:41

## 2020-01-09 RX ADMIN — POTASSIUM CHLORIDE AND SODIUM CHLORIDE 75 ML/HR: 900; 150 INJECTION, SOLUTION INTRAVENOUS at 05:28

## 2020-01-09 RX ADMIN — PETROLATUM 100 APPLICATION: 420 OINTMENT TOPICAL at 16:34

## 2020-01-09 RX ADMIN — CEFTRIAXONE SODIUM 2 G: 2 INJECTION, SOLUTION INTRAVENOUS at 14:44

## 2020-01-09 RX ADMIN — POTASSIUM CHLORIDE 40 MEQ: 750 CAPSULE, EXTENDED RELEASE ORAL at 16:34

## 2020-01-09 RX ADMIN — BRIMONIDINE TARTRATE 1 DROP: 2 SOLUTION OPHTHALMIC at 11:41

## 2020-01-09 RX ADMIN — SODIUM CHLORIDE, PRESERVATIVE FREE 10 ML: 5 INJECTION INTRAVENOUS at 11:41

## 2020-01-09 RX ADMIN — INFLUENZA VIRUS VACCINE 0.5 ML: 15; 15; 15; 15 SUSPENSION INTRAMUSCULAR at 16:36

## 2020-01-09 RX ADMIN — POTASSIUM CHLORIDE 40 MEQ: 750 CAPSULE, EXTENDED RELEASE ORAL at 00:28

## 2020-01-09 RX ADMIN — BRIMONIDINE TARTRATE 1 DROP: 2 SOLUTION OPHTHALMIC at 21:40

## 2020-01-09 RX ADMIN — ACETAMINOPHEN 650 MG: 325 TABLET, FILM COATED ORAL at 16:35

## 2020-01-09 RX ADMIN — ACETAMINOPHEN 650 MG: 325 TABLET, FILM COATED ORAL at 12:00

## 2020-01-09 NOTE — PLAN OF CARE
Problem: Patient Care Overview  Goal: Plan of Care Review  Outcome: Ongoing (interventions implemented as appropriate)  Flowsheets (Taken 1/9/2020 9624)  Progress: no change  Plan of Care Reviewed With: patient  Outcome Summary: vss. minimal c/o back pain. IVFs. q2turn. replacing K. RVP negative. IV abx. resting well throughout shift. will continue to monitor.

## 2020-01-09 NOTE — PROGRESS NOTES
Name: Raymon Solis ADMIT: 2020   : 1940  PCP: Magali Burgess MD    MRN: [id number] LOS: 1 days   AGE/SEX: 79 y.o. male  ROOM: [facility]     Subjective   Subjective   Review of systems obtained with the help of pharmacist who is fluent in Green[place]ic.   phone was not adequate given patient's encephalopathy/mumbling.  Oriented to person and hospital setting only.  Vague and unreliable historian.  Complains of low back pain and bilateral flank pain.  Very little appetite and poor p.o. intake for unknown period of time.  Does not take his medications as prescribed. Lives at home with his wife.     he denies dysuria but has retention.  he denies abdominal pain, chest pain, palpitation, shortness of breath, cough.           Objective   Objective   Vital Signs  Temp:  [97.6 °F (36.4 °C)-97.8 °F (36.6 °C)] 97.8 °F (36.6 °C)  Heart Rate:  [] 76  Resp:  [16-20] 20  BP: (129-178)/() 148/94  SpO2:  [95 %-98 %] 97 %  on   ;   Device (Oxygen Therapy): room air  Body mass index is 24.79 kg/m².  Physical Exam   Constitutional: He is oriented to person, place, and time. He appears well-developed. He appears distressed (Ill-appearing but to a lesser degree compared to 2020).   Thin malnourished.    HENT:   Head: Normocephalic and atraumatic.   Mouth/Throat: Oropharynx is clear and moist.   Eyes: No scleral icterus.   Neck: No JVD present.   Cardiovascular: Normal rate, regular rhythm and intact distal pulses.   Murmur heard.  Pulmonary/Chest: Effort normal. No stridor. No respiratory distress. He has no wheezes. He exhibits no tenderness.   Coarse breath sounds bilateral bases.  Decreased breath sounds throughout   Abdominal: Soft. Bowel sounds are normal. He exhibits no distension and no mass. There is tenderness in the suprapubic area. There is CVA tenderness (R>L). No hernia.   Musculoskeletal: He exhibits no edema.   Neurological: He is alert and oriented to person, place, and time. No  cranial nerve deficit.   Skin: Skin is warm and dry.   Psychiatric: He has a normal mood and affect. His behavior is normal. Cognition and memory are impaired.   Vitals reviewed.      Results Review:       I reviewed the patient's new clinical results.  Results from last 7 days   Lab Units 01/09/20  0553 01/08/20  1223   WBC 10*3/mm3 18.46* 27.53*   HEMOGLOBIN g/dL 12.3* 14.7   PLATELETS 10*3/mm3 389 448     Results from last 7 days   Lab Units 01/09/20  0845 01/09/20  0553 01/08/20  2339 01/08/20  1223   SODIUM mmol/L  --  141  --  144   POTASSIUM mmol/L 3.4* 3.4* 3.2* 2.7*   CHLORIDE mmol/L  --  99  --  102   CO2 mmol/L  --  29.6*  --  26.3   BUN mg/dL  --  19  --  15   CREATININE mg/dL  --  0.83  --  0.71*   GLUCOSE mg/dL  --  128*  --  131*   Estimated Creatinine Clearance: 66.9 mL/min (by C-G formula based on SCr of 0.83 mg/dL).  Results from last 7 days   Lab Units 01/08/20  1223   ALBUMIN g/dL 2.70*   BILIRUBIN mg/dL 0.8   ALK PHOS U/L 154*   AST (SGOT) U/L 16   ALT (SGPT) U/L 8     Results from last 7 days   Lab Units 01/09/20  0553 01/08/20  1223   CALCIUM mg/dL 9.6 8.5*   ALBUMIN g/dL  --  2.70*   MAGNESIUM mg/dL  --  1.6     Results from last 7 days   Lab Units 01/08/20  1655 01/08/20  1223   PROCALCITONIN ng/mL  --  0.42*   LACTATE mmol/L 2.1* 2.5*     Glucose   Date/Time Value Ref Range Status   01/09/2020 1135 135 (H) 70 - 130 mg/dL Final   01/09/2020 0647 116 70 - 130 mg/dL Final   01/08/2020 2043 127 70 - 130 mg/dL Final   01/08/2020 1821 134 (H) 70 - 130 mg/dL Final     Lab Results   Lab Value Date/Time    TROPONINT 0.080 (C) 01/09/2020 0553    TROPONINT 0.040 (C) 01/08/2020 1223    TROPONINT 0.033 (C) 06/13/2019 0613    TROPONINT 0.044 (C) 06/12/2019 2021    TROPONINI 0.018 11/06/2019 1716               aspirin 81 mg Oral Daily   brimonidine 1 drop Both Eyes TID   cefTRIAXone 2 g Intravenous Q24H   influenza vaccine 0.5 mL Intramuscular Once   insulin lispro 0-9 Units Subcutaneous 4x Daily With  Meals & Nightly   latanoprost 1 drop Both Eyes Nightly   sodium chloride 10 mL Intravenous Q12H   tamsulosin 0.4 mg Oral Nightly       sodium chloride 0.9 % with KCl 20 mEq 75 mL/hr Last Rate: 75 mL/hr (01/09/20 0528)   Diet Soft Texture; Chopped; Thin; Cardiac, Consistent Carbohydrate  Results for orders placed during the hospital encounter of 05/13/19   Adult Transthoracic Echo Complete W/ Cont if Necessary Per Protocol    Narrative · Left ventricular diastolic dysfunction (grade I) consistent with   impaired relaxation.  · Estimated EF = 73%.  · Left ventricular systolic function is hyperdynamic (EF > 70).  · Left atrial cavity size is mildly dilated.  · Moderate aortic valve stenosis is present.  · Aortic valve maximum pressure gradient is 51.0 mmHg.  · Aortic valve mean pressure gradient is 28.0 mmHg.  · Aortic valve area is 1.0 cm2.  · There is moderate calcification of the aortic valve.             Assessment/Plan     Active Hospital Problems    Diagnosis  POA   • **Bacteremia due to Escherichia coli [R78.81]  Yes   • E. coli UTI (urinary tract infection) [N39.0, B96.20]  Yes   • Acute urinary retention [R33.8]  Yes   • Left lower lobe pulmonary infiltrate [R91.8]  Yes   • Non-traumatic rhabdomyolysis [M62.82]  Unknown   • Lesion of left native kidney [N28.9]  Unknown   • Metabolic encephalopathy [G93.41]  Unknown   • Sepsis without acute organ dysfunction (CMS/HCC) [A41.9]  Yes   • History of noncompliance with medical treatment [Z91.19]  Not Applicable   • Elevated troponin [R79.89]  Yes   • Aortic stenosis [I35.0]  Yes   • Elevated alkaline phosphatase level [R74.8]  Yes   • Type 2 diabetes mellitus, without long-term current use of insulin (CMS/HCC) [E11.9]  Yes   • HTN (hypertension) [I10]  Yes   • Hypokalemia [E87.6]  Yes      Resolved Hospital Problems   No resolved problems to display.       79 y.o. male admitted with possible PNA with left lower lobe infiltrate.  Urine culture with gram-negative rods  and blood cultures with Ecoli.  Patient laid on the ground for approximately 12 hours prior to family finding him and calling EMS    EColi bacteremia secondary to  source  · Appreciate ID assistance.    · Continue Rocephin but stop azithromycin. RVP is negative  · WBC decreasing he is afebrile  · Patient was admitted to this facility in June 2019 with pyelonephritis with urine cultures growing E. Coli.     Elevated troponin/mild rhabdomyolysis  · He has no cardiac symptoms.  He has chronically elevated troponin at baseline on admission.   · Troponin increase today is minimal in the setting of elevated CK and myoglobinuria. Repeat CK and LFTs. Monitor electrolytes.   · Evaluated by cardiology in June 2019 for elevated troponin.    · Echo reviewed above  · Replace K    Left renal lesion  · CT a/p in June 2019 demonstrated left kidney lesion concerning for neoplasm with recommended follow-up with multiphase CT abdomen with and without contrast.  Await renal ultrasound. If lesion present will order CT    DM2  · Monitor glucose  · Correctional humalog  · Await HgbA1c    Suspected dementia/metabolic encephalopathy  · Encephalopathy appears to be improving today  · slp recommends mechanical soft with thin liquids\  · Glucose stable. Hgba1 c pending  · PT OT eval and case management for rehab placement     · SCDs for DVT prophylaxis.  · Full code.  · Discussed with patient and nursing staff.  · Anticipate discharge to acute rehab later this week.      COMPA Junior  [facility]  01/09/20  12:17 PM

## 2020-01-09 NOTE — THERAPY EVALUATION
Patient Name: Raymon Solis  : 1940    MRN: [id number]                              Today's Date: 2020       Admit Date: 2020    Visit Dx:     ICD-10-CM ICD-9-CM   1. Sepsis without acute organ dysfunction, due to unspecified organism (CMS/[facility]) A41.9 038.9     995.91   2. Pneumonia of left lower lobe due to infectious organism (CMS/[facility]) J18.1 486   3. Atrial fibrillation, unspecified type (CMS/[facility]) I48.91 427.31     Patient Active Problem List   Diagnosis   • Acute pyelonephritis   • HTN (hypertension)   • Type 2 diabetes mellitus, without long-term current use of insulin (CMS/[facility])   • Hypokalemia   • Elevated alkaline phosphatase level   • Aortic stenosis   • Acute UTI   • Wandering atrial pacemaker   • Elevated troponin   • Sepsis without acute organ dysfunction (CMS/[facility])   • History of noncompliance with medical treatment   • E. coli UTI (urinary tract infection)   • Bacteremia due to Escherichia coli   • Acute urinary retention   • Left lower lobe pulmonary infiltrate   • Non-traumatic rhabdomyolysis   • Lesion of left native kidney   • Metabolic encephalopathy     Past Medical History:   Diagnosis Date   • Aortic stenosis    • Diabetes mellitus (CMS/[facility])    • Hypertension      Past Surgical History:   Procedure Laterality Date   • CATARACT EXTRACTION, BILATERAL       General Information     Row Name 20 1400          PT Evaluation Time/Intention    Document Type  evaluation  -SV     Row Name 20 1400          General Information    Patient Profile Reviewed?  yes  -SV     Prior Level of Function  -- difficult to assess: Pt denies ambulation but then smiled slightly  -SV     Existing Precautions/Restrictions  fall  -SV     Barriers to Rehab  -- Pt appears to understand English and speaks some as well  -SV     Row Name 20 1400          Relationship/Environment    Lives With  -- reports living with wife  -SV     Row Name 20 1400          Resource/Environmental Concerns     "Current Living Arrangements  home/apartment/condo  -SV     Row Name 01/09/20 1400          Stairs Within Home, Primary    Stairs, Within Home, Primary  when questioned on how many steps into your home? Pt replied \"2\".   -SV     Row Name 01/09/20 1400          Cognitive Assessment/Intervention- PT/OT    Orientation Status (Cognition)  unable/difficult to assess  -SV     Cognitive Assessment/Intervention Comment  Pt appears oriented but is lethargic today.   -SV     Row Name 01/09/20 1400          Safety Issues, Functional Mobility    Safety Issues Affecting Function (Mobility)  insight into deficits/self awareness  -SV     Impairments Affecting Function (Mobility)  balance;endurance/activity tolerance  -SV     Comment, Safety Issues/Impairments (Mobility)  reports dizziness in sitting and declined 2nd attempt  -SV       User Key  (r) = Recorded By, (t) = Taken By, (c) = Cosigned By    Initials Name Provider Type    SV Sanjana Lopez, PT Physical Therapist        Mobility     Row Name 01/09/20 1422          Bed Mobility Assessment/Treatment    Bed Mobility Assessment/Treatment  supine-sit-supine  -SV     Supine-Sit-Supine [place] (Bed Mobility)  moderate assist (50% patient effort);maximum assist (25% patient effort)  -SV     Assistive Device (Bed Mobility)  draw sheet  -SV     Comment (Bed Mobility)  lethargic but agreeable to attempt  -     Row Name 01/09/20 1422          Transfer Assessment/Treatment    Comment (Transfers)  Pt very clear in English in his request to return to supine.  -SV       User Key  (r) = Recorded By, (t) = Taken By, (c) = Cosigned By    Initials Name Provider Type    SV Sanjana Lopez, PT Physical Therapist        Obj/Interventions     Row Name 01/09/20 1423          General ROM    GENERAL ROM COMMENTS  BUE arom flex 50% right, left 25%, pron/sup wfl,  wfl,   -SV     Row Name 01/09/20 1423          MMT (Manual Muscle Testing)    General MMT Comments  right hip flex in supine " wfl, left 25%, maurice ankle df/pf 25%, very lethargic and not participating fully.   -SV     Row Name 01/09/20 1423          Therapeutic Exercise    Sets/Reps (Therapeutic Exercise)  attempted laq and df seated but pt requesting supine due to dizziness  -SV     Row Name 01/09/20 1423          Static Sitting Balance    Level of Sac (Unsupported Sitting, Static Balance)  contact guard assist  -SV     Comment (Unsupported Sitting, Static Balance)  PPT in sitting: midline ( not leaning right left or post today)  -SV     Row Name 01/09/20 1423          Static Standing Balance    Comment (Supported Standing, Static Balance)  declined  -SV     Row Name 01/09/20 1423          Sensory Assessment/Intervention    Sensory General Assessment  -- NT today  -SV       User Key  (r) = Recorded By, (t) = Taken By, (c) = Cosigned By    Initials Name Provider Type    SV Sanjana Lopez, PT Physical Therapist        Goals/Plan     Row Name 01/09/20 1427          Bed Mobility Goal 1 (PT)    Activity/Assistive Device (Bed Mobility Goal 1, PT)  sit to supine/supine to sit  -SV     Sac Level/Cues Needed (Bed Mobility Goal 1, PT)  supervision required  -SV     Time Frame (Bed Mobility Goal 1, PT)  10 days  -SV     Row Name 01/09/20 1427          Transfer Goal 1 (PT)    Activity/Assistive Device (Transfer Goal 1, PT)  sit-to-stand/stand-to-sit  -SV     Sac Level/Cues Needed (Transfer Goal 1, PT)  supervision required  -SV     Time Frame (Transfer Goal 1, PT)  10 days  -SV     Barriers (Transfers Goal 1, PT)  least vs no AD  -SV     Row Name 01/09/20 1427          Gait Training Goal 1 (PT)    Activity/Assistive Device (Gait Training Goal 1, PT)  gait (walking locomotion)  -SV     Sac Level (Gait Training Goal 1, PT)  supervision required  -SV     Distance (Gait Goal 1, PT)  200' least vs no AD  -SV     Time Frame (Gait Training Goal 1, PT)  10 days  -SV       User Key  (r) = Recorded By, (t) = Taken By, (c) =  Cosigned By    Initials Name Provider Type    Sanjana Hdz, PT Physical Therapist        Clinical Impression     Row Name 01/09/20 1426          Pain Assessment    Additional Documentation  -- no reports or signs  -     Row Name 01/09/20 1426          Vital Signs    Pre Systolic BP Rehab  148  -SV     Pre Treatment Diastolic BP  94  -SV     Pretreatment Heart Rate (beats/min)  76  -SV     Intratreatment Heart Rate (beats/min)  77  -SV     Pre SpO2 (%)  97  -SV     Intra SpO2 (%)  95  -SV     Post SpO2 (%)  96  -SV     Pre Patient Position  Supine  -SV     Intra Patient Position  Sitting  -SV     Post Patient Position  Side Lying  -SV     Row Name 01/09/20 1426          Positioning and Restraints    Pre-Treatment Position  in bed  -SV     Post Treatment Position  bed  -SV     In Bed  side lying left;exit alarm on;call light within reach;encouraged to call for assist  -SV       User Key  (r) = Recorded By, (t) = Taken By, (c) = Cosigned By    Initials Name Provider Type    Sanjana Hdz, PT Physical Therapist        Outcome Measures     Row Name 01/09/20 1429          How much help from another person do you currently need...    Turning from your back to your side while in flat bed without using bedrails?  2  -SV     Moving from lying on back to sitting on the side of a flat bed without bedrails?  2  -SV     Moving to and from a bed to a chair (including a wheelchair)?  1  -SV     Standing up from a chair using your arms (e.g., wheelchair, bedside chair)?  2  -SV     Climbing 3-5 steps with a railing?  1  -SV     To walk in hospital room?  1  -SV     AM-PAC 6 Clicks Score (PT)  9  -SV     Row Name 01/09/20 1429          Functional Assessment    Outcome Measure Options  AM-PAC 6 Clicks Basic Mobility (PT)  -SV       User Key  (r) = Recorded By, (t) = Taken By, (c) = Cosigned By    Initials Name Provider Type    Sanjana Hdz, PT Physical Therapist          PT Recommendation and Plan  Planned  "Therapy Interventions (PT Eval): balance training, bed mobility training, gait training, home exercise program, stair training, patient/family education, strengthening, stretching, transfer training  Plan of Care Reviewed With: patient  Outcome Summary: Pt admit with ecoli bacteremia/sepsis due to urinary retention, non traumatic rhabdomyolosis, and PNA. Pt was found down at home with family reporting a fall. Pt followed some commands today and responds appropriately at times but appears lethargic and weak. PLOF is unknown fully but appears to be a community ambulator. Pt reported \"2\" when questioned on how many steps into his home and \"no\" when questioned on and prior difficulty with those steps. Pt followed commands inconsistently at times but participated with mobility. Mod/max asst to eob today from supine. He sat for 3 minutes with cga no lean noted. Pt reported dizziness at 2 minutes and requested return to supine. Pt unable to tolerated increased activity with encouragement. Pt educated on need for increased activity and will likely benefit from cont skilled PT for progession toward indep mobility prior to d/c home if able. Pt with noted general weakness, impaired mobility in all areas with great risk for falls at this time.     Time Calculation:   PT Charges     Row Name 01/09/20 1415             Time Calculation    Start Time  1415  -SV      Stop Time  1430  -SV      Time Calculation (min)  15 min  -SV      PT Received On  01/09/20  -SV      PT - Next Appointment  01/10/20  -SV      PT Goal Re-Cert Due Date  01/19/20  -SV        User Key  (r) = Recorded By, (t) = Taken By, (c) = Cosigned By    Initials Name Provider Type    SV Sanjana Lopez, PT Physical Therapist        Therapy Charges for Today     Code Description Service Date Service Provider Modifiers Qty    02581205315  PT EVAL LOW COMPLEXITY 2 1/9/2020 Sanjana Lopez, PT GP 1          PT G-Codes  Outcome Measure Options: AM-PAC 6 Clicks Basic " Mobility (PT)  AM-PAC 6 Clicks Score (PT): 9    Sanjana Lopez, PT  1/9/2020

## 2020-01-09 NOTE — PLAN OF CARE
Problem: Patient Care Overview  Goal: Plan of Care Review  Outcome: Ongoing (interventions implemented as appropriate)  Flowsheets (Taken 1/9/2020 5788)  Plan of Care Reviewed With: patient; daughter  Note:   SLP completed bedside swallow evaluation recommend mechanical soft with thin liquids, meds whole with thins, and small bites and sips, upright 90 degrees for meals. Discussed swallow/reflux precautions with patient and daughter.

## 2020-01-09 NOTE — PLAN OF CARE
Problem: Patient Care Overview  Goal: Plan of Care Review  Flowsheets (Taken 1/9/2020 1926)  Plan of Care Reviewed With: patient  Outcome Summary: OT evaluation completed this date. Pt admit 2/2 generalized weakness and PNA. Pt presents with current deficits in ADL partciaption, fxl transfers, safety, balance, endurance, and overall strength. Pt limited 2/2 weakness. Pts daughter present during eval to provide info. Previously, pt was needing assist for dressing and bathing but was able to assist. Pt req'd min A to complete task for face washing ADL this date. Anticipate pt to need TAMARA at d/c. Will continue to follow.

## 2020-01-09 NOTE — CONSULTS
Adult Nutrition  Assessment/PES    Patient Name:  Raymon Solis  YOB: 1940  MRN: [id number]  Admit Date:  1/8/2020    Assessment Date:  1/9/2020    Comments:  Nutrition consult initiated. Pt off unit in [place]. No family in room. Per chart review, pt has lost about 35 lbs in 9 months from chromic poor po intake. Pt feels terrible per RN.  Will go ahead and add boost glucose control with meals and follow up on po intake, possible MSA.    Reason for Assessment     Row Name 01/09/20 1502          Reason for Assessment    Reason For Assessment  identified at risk by screening criteria;nurse/nurse practitioner consult;physician consult     Diagnosis  infection/sepsis;neurologic conditions;metabolic state Sepsis, UTI, ecoli, DM, HTN, rhabdomyolysis, Encephalopathy         Nutrition/Diet History     Row Name 01/09/20 1504          Nutrition/Diet History    Typical Food/Fluid Intake  poor appetite/po intake; feels terrible per RN     Factors Affecting Nutritional Intake  impaired cognitive status/motor control         Anthropometrics     Row Name 01/09/20 1504          Admit Weight    Admit Weight  65.5 kg (144 lb 6.4 oz)        Usual Body Weight (UBW)    Weight Loss  unintentional     Weight Loss Time Frame  35lb in 9 months        Body Mass Index (BMI)    BMI Assessment  BMI 18.5-24.9: normal         Labs/Tests/Procedures/Meds     Row Name 01/09/20 1505          Labs/Procedures/Meds    Lab Results Reviewed  reviewed, pertinent     Lab Results Comments  alb, K, glu, alkp, wbc        Diagnostic Tests/Procedures    Diagnostic Test/Procedure Reviewed  reviewed, pertinent     Diagnostic Test/Procedures Comments  SLP eval today        Medications    Pertinent Medications Reviewed  reviewed, pertinent     Pertinent Medications Comments  asa, Abx, insulin, ivf         Physical Findings     Row Name 01/09/20 1506          Physical Findings    Overall Physical Appearance  -- pt off unit in [place]     Skin  pressure injury  ankle and foot PI, b=13         Estimated/Assessed Needs     Row Name 01/09/20 1506          Calculation Measurements    Weight Used For Calculations  65.5 kg (144 lb 6.4 oz)        Estimated/Assessed Needs    Additional Documentation  Fluid Requirements (Group);Protein Requirements (Group);KCAL/KG (Group)        KCAL/KG    KCAL/KG  25 Kcal/Kg (kcal);30 Kcal/Kg (kcal)     25 Kcal/Kg (kcal)  1637.5     30 Kcal/Kg (kcal)  1965        Protein Requirements    Weight Used For Protein Calculations  65.5 kg (144 lb 6.4 oz)     Est Protein Requirement Amount (gms/kg)  1.5 gm protein 78-98g pro (1.2-1.5)     Estimated Protein Requirements (gms/day)  98.25        Fluid Requirements    Estimated Fluid Requirements (mL/day)  1800         Nutrition Prescription Ordered     Row Name 01/09/20 1507          Nutrition Prescription PO    Current PO Diet  Soft Texture     Fluid Consistency  Thin     Common Modifiers  Consistent Carbohydrate;Cardiac         Evaluation of Received Nutrient/Fluid Intake     Row Name 01/09/20 1508          PO Evaluation    Number of Meals  1     % PO Intake  50%         Problem/Interventions:  Problem 1     Row Name 01/09/20 1522          Nutrition Diagnoses Problem 1    Problem 1  Predicted Suboptimal Intake     Signs/Symptoms (evidenced by)  Report of Mnimal PO Intake;Unintended Weight Change     Unintended Weight Change  Loss     Number of Pounds Lost  35lb     Weight loss time period  9 months         Intervention Goal     Row Name 01/09/20 1523          Intervention Goal    General  Maintain nutrition;Meet nutritional needs for age/condition;Reduce/improve symptoms;Disease management/therapy;Improved nutrition related lab(s)     PO  Tolerate PO;Increase intake;PO intake (%)     PO Intake %  80 %     Weight  No significant weight loss         Nutrition Intervention     Row Name 01/09/20 1523          Nutrition Intervention    RD/Tech Action  Care plan reviewd;Follow Tx progress;Recommend/ordered      Recommended/Ordered  Supplement         Nutrition Prescription     Row Name 01/09/20 1523          Nutrition Prescription PO    PO Prescription  Begin/change supplement     Supplement  Boost Glucose Control     Supplement Frequency  3 times a day     New PO Prescription Ordered?  Yes         Education/Evaluation     Row Name 01/09/20 1524          Education    Education  Will Instruct as appropriate        Monitor/Evaluation    Monitor  Per protocol;Skin status;Symptoms;I&O;PO intake;Supplement intake;Pertinent labs;Weight           Electronically signed by:  Nel Keen RD  01/09/20 3:24 PM

## 2020-01-09 NOTE — THERAPY EVALUATION
Acute Care - Speech Language Pathology   Swallow Initial Evaluation [facility]     Patient Name: Raymon Solis  : 1940  MRN: [id number]  Today's Date: 2020               Admit Date: 2020    Visit Dx:     ICD-10-CM ICD-9-CM   1. Sepsis without acute organ dysfunction, due to unspecified organism (CMS/[facility]) A41.9 038.9     995.91   2. Pneumonia of left lower lobe due to infectious organism (CMS/[facility]) J18.1 486   3. Atrial fibrillation, unspecified type (CMS/[facility]) I48.91 427.31     Patient Active Problem List   Diagnosis   • Acute pyelonephritis   • HTN (hypertension)   • Type 2 diabetes mellitus, without long-term current use of insulin (CMS/[facility])   • Hypokalemia   • Elevated alkaline phosphatase level   • Aortic stenosis   • Acute UTI   • Wandering atrial pacemaker   • Elevated troponin   • Sepsis without acute organ dysfunction (CMS/[facility])   • CAP (community acquired pneumonia)   • History of noncompliance with medical treatment     Past Medical History:   Diagnosis Date   • Aortic stenosis    • Diabetes mellitus (CMS/[facility])    • Hypertension      Past Surgical History:   Procedure Laterality Date   • CATARACT EXTRACTION, BILATERAL          SWALLOW EVALUATION (last 72 hours)      SLP Adult Swallow Evaluation     Row Name 20 0900                   Rehab Evaluation    Document Type  evaluation  -KA        Subjective Information  no complaints  -KA        Patient Observations  alert;cooperative  -KA        Patient Effort  good  -KA        Symptoms Noted During/After Treatment  none  -KA           General Information    Patient Profile Reviewed  yes  -KA        Pertinent History Of Current Problem  Patient admitted with left lower lobe PNa, community acquired (melvin present, RN at [facility]), reports pt does not have difficulty swallowing.   -KA        Current Method of Nutrition  regular textures;thin liquids  -KA        Precautions/Limitations, Vision  WFL;for purposes of eval  -KA         Precautions/Limitations, Hearing  WFL;for purposes of eval  -KA        Prior Level of Function-Swallowing  no diet consistency restrictions;safe, efficient swallowing in all situations  -KA        Plans/Goals Discussed with  patient and family  -KA        Barriers to Rehab  language barrier daughter interpreted   -KA        Patient's Goals for Discharge  patient did not state  -KA        Family Goals for Discharge  family did not state  -KA           Pain Assessment    Additional Documentation  Pain Scale: Numbers Pre/Post-Treatment (Group)  -KA           Pain Scale: Numbers Pre/Post-Treatment    Pain Scale: Numbers, Pretreatment  0/10 - no pain  -KA        Pain Scale: Numbers, Post-Treatment  0/10 - no pain  -KA           Oral Motor and Function    Dentition Assessment  edentulous, does not have dentures;other (see comments) had dentures no longer fit  -KA        Secretion Management  WNL/WFL  -KA        Mucosal Quality  moist, healthy  -KA        Volitional Swallow  WFL  -KA        Volitional Cough  WFL  -KA           Oral Musculature and Cranial Nerve Assessment    Oral Motor General Assessment  generalized oral motor weakness  -KA           General Eating/Swallowing Observations    Respiratory Support Currently in Use  room air  -KA        Eating/Swallowing Skills  self-fed;fed by SLP  -KA        Positioning During Eating  upright in bed  -KA        Utensils Used  spoon;cup;straw  -KA        Consistencies Trialed  soft textures;whole;thin liquids  -KA           Clinical Swallow Eval    Oral Prep Phase  WFL  -KA        Oral Transit  WFL  -KA        Oral Residue  WFL  -KA        Pharyngeal Phase  no overt signs/symptoms of pharyngeal impairment  -KA        Esophageal Phase  unremarkable  -KA        Clinical Swallow Evaluation Summary  Swallow appears WNL, adequate laryngeal elevation upon neck palpation. No overt s/s of pen/asp with all tested consistencies.  Daughter stated soft solids would be best due to lack  of dentition   -KA           Clinical Impression    SLP Swallowing Diagnosis  functional oral phase;functional pharyngeal phase  -KA        Functional Impact  no impact on function  -        Swallow Criteria for Skilled Therapeutic Interventions Met  no problems identified which require skilled intervention  -           Recommendations    Therapy Frequency (Swallow)  evaluation only  -        SLP Diet Recommendation  thin liquids;soft textures  -KA        Recommended Precautions and Strategies  upright posture during/after eating;small bites of food and sips of liquid  -KA        SLP Rec. for Method of Medication Administration  meds whole;with thin liquids;with pudding or applesauce;as tolerated  -        Monitor for Signs of Aspiration  yes;notify SLP if any concerns  -        Anticipated Dischage Disposition  unknown  -          User Key  (r) = Recorded By, (t) = Taken By, (c) = Cosigned By    Initials Name Effective Dates    Lowell Mata MA,[facility]-SLP 06/08/18 -           EDUCATION  The patient has been educated in the following areas:   Dysphagia (Swallowing Impairment).    SLP Recommendation and Plan  SLP Swallowing Diagnosis: functional oral phase, functional pharyngeal phase  SLP Diet Recommendation: thin liquids, soft textures  Recommended Precautions and Strategies: upright posture during/after eating, small bites of food and sips of liquid  SLP Rec. for Method of Medication Administration: meds whole, with thin liquids, with pudding or applesauce, as tolerated     Monitor for Signs of Aspiration: yes, notify SLP if any concerns     Swallow Criteria for Skilled Therapeutic Interventions Met: no problems identified which require skilled intervention  Anticipated Dischage Disposition: unknown     Therapy Frequency (Swallow): evaluation only          Plan of Care Reviewed With: patient, daughter         SLP Outcome Measures (last 72 hours)      SLP Outcome Measures     Row Name 01/09/20 0900              SLP Outcome Measures    Outcome Measure Used?  Adult NOMS  -         Adult FCM Scores    FCM Chosen  Swallowing  -      Swallowing FCM Score  5  -KA        User Key  (r) = Recorded By, (t) = Taken By, (c) = Cosigned By    Initials Name Effective Dates    Lowell Mata MA,[facility]-SLP 06/08/18 -            Time Calculation:   Time Calculation- SLP     Row Name 01/09/20 0949             Time Calculation- SLP    SLP Start Time  0900  -      SLP Received On  01/09/20  -        User Key  (r) = Recorded By, (t) = Taken By, (c) = Cosigned By    Initials Name Provider Type    Lowell Mata MA,[facility]-SLP Speech and Language Pathologist          Therapy Charges for Today     Code Description Service Date Service Provider Modifiers Qty    79068668087  ST EVAL ORAL PHARYNG SWALLOW 4 1/9/2020 Lowell Brooks MA,[facility]-SLP GN 1               Lowell Brooks MA,CAREN-SLP  1/9/2020

## 2020-01-09 NOTE — THERAPY EVALUATION
Acute Care - Occupational Therapy Initial Evaluation  [facility]     Patient Name: Raymon Solis  : 1940  MRN: [id number]  Today's Date: 2020             Admit Date: 2020       ICD-10-CM ICD-9-CM   1. Sepsis without acute organ dysfunction, due to unspecified organism (CMS/[facility]) A41.9 038.9     995.91   2. Pneumonia of left lower lobe due to infectious organism (CMS/[facility]) J18.1 486   3. Atrial fibrillation, unspecified type (CMS/[facility]) I48.91 427.31     Patient Active Problem List   Diagnosis   • Acute pyelonephritis   • HTN (hypertension)   • Type 2 diabetes mellitus, without long-term current use of insulin (CMS/[facility])   • Hypokalemia   • Elevated alkaline phosphatase level   • Aortic stenosis   • Acute UTI   • Wandering atrial pacemaker   • Elevated troponin   • Sepsis without acute organ dysfunction (CMS/[facility])   • History of noncompliance with medical treatment   • E. coli UTI (urinary tract infection)   • Bacteremia due to Escherichia coli   • Acute urinary retention   • Left lower lobe pulmonary infiltrate   • Non-traumatic rhabdomyolysis   • Lesion of left native kidney   • Metabolic encephalopathy     Past Medical History:   Diagnosis Date   • Aortic stenosis    • Diabetes mellitus (CMS/[facility])    • Hypertension      Past Surgical History:   Procedure Laterality Date   • CATARACT EXTRACTION, BILATERAL            OT ASSESSMENT FLOWSHEET (last 12 hours)      Occupational Therapy Evaluation     Row Name 20 1425                   OT Evaluation Time/Intention    Subjective Information  no complaints  -        Document Type  evaluation  -        Mode of Treatment  individual therapy;occupational therapy  -        Patient Effort  good  -        Comment  -- daughter present and translating for    -           General Information    Patient Profile Reviewed?  yes  -        Patient Observations  alert;cooperative;agree to therapy  -        Prior Level of Function  mod assist:;ADL's  -         Existing Precautions/Restrictions  fall  -        Barriers to Rehab  language barrier  -           Cognitive Assessment/Intervention- PT/OT    Orientation Status (Cognition)  unable/difficult to assess;oriented to;person  -        Follows Commands (Cognition)  follows one step commands;increased processing time needed;verbal cues/prompting required  -           Bed Mobility Assessment/Treatment    Comment (Bed Mobility)  -- pt lethargic and not appropriate at this time . will need 2P  -           ADL Assessment/Intervention    BADL Assessment/Intervention  grooming  -           Grooming Assessment/Training    [place] Level (Grooming)  wash face, hands;minimum assist (75% patient effort);verbal cues;set up  -        Assistive Devices (Grooming)  hand over hand  -        Grooming Position  supine  -        Comment (Grooming)  -- pt completed 2/3 of task with set up, req'd hand over hand  -           BADL Safety/Performance    Impairments, BADL Safety/Performance  cognition;endurance/activity tolerance  -        Cognitive Impairments, BADL Safety/Performance  awareness, need for assistance;problem solving/reasoning  -           General ROM    GENERAL ROM COMMENTS  -- B UE limited 3/4 range   -           MMT (Manual Muscle Testing)    General MMT Comments  -- B UE 3+/5 grossly   -           Positioning and Restraints    Pre-Treatment Position  in bed  -        In Bed  supine;call light within reach;encouraged to call for assist;exit alarm on  -           Pain Scale: Numbers Pre/Post-Treatment    Pain Scale: Numbers, Pretreatment  0/10 - no pain  -        Pain Scale: Numbers, Post-Treatment  0/10 - no pain  -           Wound 01/08/20 1726 Left lateral ankle Pressure Injury    Wound - Properties Group Date first assessed: 01/08/20  -JW Time first assessed: 1726  -JW Present on Hospital Admission: Y  -JW Side: Left  -JW Orientation: lateral  -JW Location: ankle  -JW Primary  Wound Type: Pressure inj  -JW Stage, Pressure Injury: Stage 1  -JW       Wound 01/09/20 1307 Left foot Pressure Injury    Wound - Properties Group Date first assessed: 01/09/20  - Time first assessed: 1307  - Present on Hospital Admission: Y  -LC Side: Left  -LC Location: foot  -LC Primary Wound Type: Pressure inj  -LC Stage, Pressure Injury: Stage 1  -LC       Coping    Observed Emotional State  calm;cooperative  -        Verbalized Emotional State  acceptance  -           Plan of Care Review    Plan of Care Reviewed With  patient;daughter  -           Clinical Impression (OT)    Criteria for Skilled Therapeutic Interventions Met (OT Eval)  yes;treatment indicated  -        Rehab Potential (OT Eval)  good, to achieve stated therapy goals  -        Therapy Frequency (OT Eval)  5 times/wk  -        Care Plan Review (OT)  evaluation/treatment results reviewed;patient/other agree to care plan  -        Anticipated Discharge Disposition (OT)  skilled nursing facility  -           Vital Signs    Pre SpO2 (%)  -- 96  -        Intra SpO2 (%)  96  -        Post SpO2 (%)  96  -           OT Goals    Bed Mobility Goal Selection (OT)  bed mobility, OT goal 1  -        Dressing Goal Selection (OT)  dressing, OT goal 1  -        Toileting Goal Selection (OT)  --  -        Additional Documentation  Grooming Goal Selection (OT) (Row);Strength Goal Selection (OT) (Row)  -           Bed Mobility Goal 1 (OT)    Activity/Assistive Device (Bed Mobility Goal 1, OT)  bed mobility activities, all  -        [place] Level/Cues Needed (Bed Mobility Goal 1, OT)  minimum assist (75% or more patient effort);2 person assist;verbal cues required  -        Time Frame (Bed Mobility Goal 1, OT)  short term goal (STG);1 week  -        Progress/Outcomes (Bed Mobility Goal 1, OT)  goal ongoing  -           Dressing Goal 1 (OT)    Activity/Assistive Device (Dressing Goal 1, OT)  upper body dressing  -         [place]/Cues Needed (Dressing Goal 1, OT)  minimum assist (75% or more patient effort);verbal cues required  -        Time Frame (Dressing Goal 1, OT)  short term goal (STG);1 week  -JH        Progress/Outcome (Dressing Goal 1, OT)  goal ongoing  -           Toileting Goal 1 (OT)    Activity/Device (Toileting Goal 1, OT)  toileting skills, all;commode, bedside without drop arms  -        [place] Level/Cues Needed (Toileting Goal 1, OT)  moderate assist (50-74% patient effort);verbal cues required  -        Time Frame (Toileting Goal 1, OT)  short term goal (STG);1 week  -JH        Progress/Outcome (Toileting Goal 1, OT)  goal ongoing  -          User Key  (r) = Recorded By, (t) = Taken By, (c) = Cosigned By    Initials Name Effective Dates    LC Rebecca Leavitt RN 06/16/16 -     Tim Chung RN 07/06/16 -     Shaheen Owen OT 09/04/19 -                OT Recommendation and Plan  Outcome Summary/Treatment Plan (OT)  Anticipated Discharge Disposition (OT): skilled nursing facility  Therapy Frequency (OT Eval): 5 times/wk  Plan of Care Review  Plan of Care Reviewed With: patient  Plan of Care Reviewed With: patient  Outcome Summary: OT evaluation completed this date. Pt admit 2/2 generalized weakness and PNA. Pt presents with current deficits in ADL partciaption, fxl transfers, safety, balance, endurance, and overall strength. Pt limited 2/2 weakness. Pts daughter present during eval to provide info. Previously, pt was needing assist for dressing and bathing but was able to assist. Pt req'd min A to complete task for face washing ADL this date. Anticipate pt to need TAMARA at d/c. Will continue to follow.    Outcome Measures     Row Name 01/09/20 4700             How much help from another is currently needed...    Putting on and taking off regular lower body clothing?  1  -JH      Bathing (including washing, rinsing, and drying)  1  -JH      Toileting (which includes using toilet bed  pan or urinal)  1  -      Putting on and taking off regular upper body clothing  1  -      Taking care of personal grooming (such as brushing teeth)  3  -      Eating meals  2  -      AM-[facility] 6 Clicks Score (OT)  9  -         Functional Assessment    Outcome Measure Options  AM-[facility] 6 Clicks Daily Activity (OT)  -        User Key  (r) = Recorded By, (t) = Taken By, (c) = Cosigned By    Initials Name Provider Type     Shaheen Manley OT Occupational Therapist          Time Calculation:   Time Calculation- OT     Row Name 01/09/20 1441             Time Calculation-     OT Start Time  1234  -      OT Stop Time  1246  -      OT Time Calculation (min)  12 min  -      Total Timed Code Minutes-   12 minute(s)  -      OT Received On  01/09/20  -        User Key  (r) = Recorded By, (t) = Taken By, (c) = Cosigned By    Initials Name Provider Type    Shaheen Owen OT Occupational Therapist        Therapy Charges for Today     Code Description Service Date Service Provider Modifiers Qty    [id number]  OT EVAL MOD COMPLEXITY 2 1/9/2020 Shaheen Manley OT GO 1    [id number]  OT SELF CARE/MGMT/TRAIN EA 15 MIN 1/9/2020 Shaheen Manley OT GO 1               Shaheen Manley OT  1/9/2020

## 2020-01-09 NOTE — NURSING NOTE
Patient had not voided today and patient reports he does not have an urge to go.  Bladder palpated and felt to be distended.  Bladder scan revealed 598 ml.  Straight cath performed and 300 ml of dark yellow urine with white pus out.

## 2020-01-09 NOTE — NURSING NOTE
CWON consult received. Patient with a deep tissue pressure injury to left lateral ankle measuring 1 cm x 0.5 cm and a stage 1 PI to the top of left foot, measuring 0.5 cm x 0.5 cm. Both wounds were present on admission and are stable. BLE dry and flaky. Will recommend continuous offloading with protective heel boots and Aquaphor to aid in moisturizing. Discussed with CHANCE Holman. Thank you for consult and please call with any questions.

## 2020-01-09 NOTE — PROGRESS NOTES
Discharge Planning Assessment  [facility]     Patient Name: Raymon Solis  MRN: [id number]  Today's Date: 1/9/2020    Admit Date: 1/8/2020    Discharge Needs Assessment     Row Name 01/09/20 1348       Living Environment    Lives With  spouse    Current Living Arrangements  home/apartment/condo    Provides Primary Care For  no one, unable/limited ability to care for self    Family Caregiver if Needed  child(laquita), adult    Quality of Family Relationships  involved       Transition Planning    Patient/Family Anticipates Transition to  inpatient rehabilitation facility    Patient/Family Anticipated Services at Transition  rehabilitation services;skilled nursing    Transportation Anticipated  family or friend will provide       Discharge Needs Assessment    Readmission Within the Last 30 Days  no previous admission in last 30 days    Concerns to be Addressed  adjustment to diagnosis/illness;cognitive/perceptual;compliance issue;decision making;home safety    Equipment Currently Used at Home  walker, rolling;glucometer    Anticipated Changes Related to Illness  inability to care for self    Outpatient/Agency/Support Group Needs  skilled nursing facility    Discharge Facility/Level of Care Needs  rehabilitation facility;nursing facility, skilled    Provided post acute provider list?  Yes    Post Acute Provider Lists  Nursing Home;Inpatient Rehab    Delivered To  Support Person        Discharge Plan     Row Name 01/09/20 1359       Plan    Plan  SNF     Patient/Family in Agreement with Plan  yes    Plan Comments  Met w/ pt at the bedside his primary language is [place];  [facility] pharmacist is at the bedside to assist w/ translation (Pharmacist is fluent in [place]). Pt is speaking too softlty for the  phone which is also at the bedside. I introduced myself and explained CCP role. Pt lives w/ his spouse. Pt is agreeable w/ dc to rehab he requested that CCP phone his daughter to discuss rehab options. S/w pt's  daughter, Layla [phone]. She would like a SNF list and RTR book w/ rehab choices. Pt/family to review choices and follow up. CCP will f/u 1/10.          Destination      Coordination has not been started for this encounter.      Durable Medical Equipment      Coordination has not been started for this encounter.      Dialysis/Infusion      Coordination has not been started for this encounter.      Home Medical Care      Coordination has not been started for this encounter.      Therapy      Coordination has not been started for this encounter.      Community Resources      Coordination has not been started for this encounter.          Demographic Summary    No documentation.       Functional Status     Row Name 01/09/20 9779       Functional Status    Usual Activity Tolerance  moderate    Current Activity Tolerance  poor       Functional Status, IADL    Medications  assistive person    Meal Preparation  assistive person    Housekeeping  assistive equipment and person    Laundry  assistive equipment and person    Shopping  assistive equipment and person       Mental Status Summary    Recent Changes in Mental Status/Cognitive Functioning  unable to assess        Psychosocial    No documentation.       Abuse/Neglect    No documentation.       Legal    No documentation.       Substance Abuse    No documentation.       Patient Forms     Row Name 01/09/20 1356       Patient Forms    Provider Choice List  Delivered    Delivered to  Support person    Method of delivery  In person            Amy Scott RN

## 2020-01-09 NOTE — CONSULTS
Referring Provider: Dr Lee    Reason for Consultation: positive blood cultures    History of present illness:  Raymon Solis is a 79 y.o. who I am asked to evaluate and give opinion for positive blood cultures. History is obtained from the patient's RN and chart (patient slept through entire exam despite prompting) which I summarize/synthesize as follows: He presented from home with generalized weakness, acute on chronic lower back pain, difficulty urinating and being found down at home. No associated fevers. No alleviating factors. There are no reports of cough or shortness of air.     In the ER he was afebrile with normal HR and elevated BP. However, labs notable for WBC 27k. CXR read as having a LLL infiltrate. He was started on ceftriaxone and azithromycin. ID consulted this morning due to blood cultures positive for E coli and 100k GNR colonies growing in the urine. He is currently on ceftriaxone and azithromycin.     PMH:  Controlled DM2  HTN  Aortic stenosis    Past Surgical History:   Procedure Laterality Date   • CATARACT EXTRACTION, BILATERAL         Social History:  Lives alone w/ family nearby    Family History:  Unable to obtain due to mental status    Antibiotic allergies and intolerances:  None    Medications:    Current Facility-Administered Medications:   •  acetaminophen (TYLENOL) tablet 650 mg, 650 mg, Oral, Q4H PRN **OR** acetaminophen (TYLENOL) 160 MG/5ML solution 650 mg, 650 mg, Oral, Q4H PRN **OR** acetaminophen (TYLENOL) suppository 650 mg, 650 mg, Rectal, Q4H PRN, Anna Soriano APRN  •  aspirin EC tablet 81 mg, 81 mg, Oral, Daily, Bradley Pearce MD  •  cefTRIAXone (ROCEPHIN) IVPB 1 g, 1 g, Intravenous, Q24H **AND** azithromycin (ZITHROMAX) 500 mg 0.9% NaCl (Add-vantage) 250 mL, 500 mg, Intravenous, Q24H, Anna Soriano APRN  •  brimonidine (ALPHAGAN) 0.2 % ophthalmic solution 1 drop, 1 drop, Both Eyes, TID, Bradley Pearce MD  •  dextrose (D50W) 25 g/ 50mL  Intravenous Solution 25 g, 25 g, Intravenous, Q15 Min PRN, Bradley Pearce MD  •  dextrose (GLUTOSE) oral gel 15 g, 15 g, Oral, Q15 Min PRN, Bradley Pearce MD  •  glucagon (human recombinant) (GLUCAGEN DIAGNOSTIC) injection 1 mg, 1 mg, Subcutaneous, Q15 Min PRN, Bradley Pearce MD  •  influenza vac split quad (FLUZONE,FLUARIX,AFLURIA) injection 0.5 mL, 0.5 mL, Intramuscular, Once, Bradley Pearce MD  •  insulin lispro (humaLOG) injection 0-9 Units, 0-9 Units, Subcutaneous, 4x Daily With Meals & Nightly, Bradley Pearce MD  •  ipratropium-albuterol (DUO-NEB) nebulizer solution 3 mL, 3 mL, Nebulization, Q4H PRN, Anna Soriano, APRCARMEN  •  latanoprost (XALATAN) 0.005 % ophthalmic solution 1 drop, 1 drop, Both Eyes, Nightly, Bradley Pearce MD  •  nitroglycerin (NITROSTAT) SL tablet 0.4 mg, 0.4 mg, Sublingual, Q5 Min PRN, Anna Soriano, APRCARMEN  •  ondansetron (ZOFRAN) injection 4 mg, 4 mg, Intravenous, Q6H PRN, Anna Soriano, APRCARMEN  •  potassium chloride (MICRO-K) CR capsule 40 mEq, 40 mEq, Oral, PRN, 40 mEq at 01/09/20 4373 **OR** potassium chloride (KLOR-CON) packet 40 mEq, 40 mEq, Oral, PRN **OR** potassium chloride 10 mEq in 100 mL IVPB, 10 mEq, Intravenous, Q1H PRN, Sonia Sousa, APRN  •  [COMPLETED] Insert peripheral IV, , , Once **AND** sodium chloride 0.9 % flush 10 mL, 10 mL, Intravenous, PRN, Keon Bourgeois MD  •  sodium chloride 0.9 % flush 10 mL, 10 mL, Intravenous, Q12H, Anna Soriano APRN, 10 mL at 01/08/20 0384  •  sodium chloride 0.9 % flush 10 mL, 10 mL, Intravenous, PRN, Anna Soriano APRN  •  sodium chloride 0.9 % with KCl 20 mEq/L infusion, 75 mL/hr, Intravenous, Continuous, Bradley Pearce MD, Last Rate: 75 mL/hr at 01/09/20 0528, 75 mL/hr at 01/09/20 0528  •  tamsulosin (FLOMAX) 24 hr capsule 0.4 mg, 0.4 mg, Oral, Nightly, Bradley Pearce MD    Review of Systems  Unable to obtain due to mental status    Objective   Vital Signs   Temp:   [97.6 °F (36.4 °C)-97.9 °F (36.6 °C)] 97.6 °F (36.4 °C)  Heart Rate:  [] 100  Resp:  [16] 16  BP: (129-178)/() 139/73    Physical Exam:   General: asleep  Head: Normocephalic, grey beard  Eyes: no scleral icterus  ENT: MMM, edentulous  Neck: Supple  Cardiovascular: tachycardic, RR, 3/6 JODY loudest at RUSB, no LE edema  Respiratory: Lungs are clear to auscultation bilaterally, no rales or wheezing; normal work of breathing on ambient air  GI: Abdomen is soft, non-tender, non-distended  : no Severino catheter present  Skin: No rashes  Psychiatric: calm, sleeping  Vasc: no cyanosis; PIV w/o erythema    Labs:     Lab Results   Component Value Date    WBC 18.46 (H) 01/09/2020    HGB 12.3 (L) 01/09/2020    HCT 35.8 (L) 01/09/2020    MCV 86.9 01/09/2020     01/09/2020       Lab Results   Component Value Date    GLUCOSE 128 (H) 01/09/2020    BUN 19 01/09/2020    CREATININE 0.83 01/09/2020    EGFRIFNONA 89 01/09/2020    EGFRIFAFRI 108 01/09/2020    BCR 22.9 01/09/2020    CO2 29.6 (H) 01/09/2020    CALCIUM 9.6 01/09/2020    ALBUMIN 2.70 (L) 01/08/2020    LABIL2 0.8 (L) 11/06/2019    AST 16 01/08/2020    ALT 8 01/08/2020     Lab Results   Component Value Date    HGBA1C 6.80 (H) 06/13/2019         Myoglobin positive  UA 13-20 WBCs, 3-5 RBCs, 4+ bacteria, 3-6 squamous cells  Procal 0.42    Microbiology:  1/8 RVP: negative  1/8 BCx: E coli  1/8 UCx: 100k GNR    Radiology (personally reviewed report and interpreted):  CXR with left basilar opacification; remainder of lungs very clear    Assessment/Plan   1. E coli septicemia due to  source  2. Mild left lower lobe infiltrate  3. Controlled DM2  4. Aortic stenosis    He likely has E coli septicemia due to urinary retention. I'll check a renal ultrasound to make sure no hydronephrosis. Agree with ceftriaxone but increase dose to 2 g IV q24h. Repeat BCx later today to document sterility. I recommend stopping azithromycin. He is breathing comfortably on room  air. RVP negative. WBC improved 27-->18 today. Check A1c w/ AM labs. Supportive care per primary team.     Thank you for this consult. ID will follow.

## 2020-01-09 NOTE — PLAN OF CARE
"  Problem: Patient Care Overview  Goal: Plan of Care Review  Outcome: Ongoing (interventions implemented as appropriate)  Flowsheets (Taken 1/9/2020 1430)  Plan of Care Reviewed With: patient  Outcome Summary: Pt admit with ecoli bacteremia/sepsis due to urinary retention, non traumatic rhabdomyolosis, and PNA. Pt was found down at home with family reporting a fall. Pt followed some commands today and responds appropriately at times but appears lethargic and weak. PLOF is unknown fully but appears to be a community ambulator. Pt reported \"2\" when questioned on how many steps into his home and \"no\" when questioned on and prior difficulty with those steps. Pt followed commands inconsistently at times but participated with mobility. Mod/max asst to eob today from supine. He sat for 3 minutes with cga no lean noted. Pt reported dizziness at 2 minutes and requested return to supine. Pt unable to tolerated increased activity with encouragement. Pt educated on need for increased activity and will likely benefit from cont skilled PT for progession toward indep mobility prior to d/c home if able. Pt with noted general weakness, impaired mobility in all areas with great risk for falls at this time.     "

## 2020-01-10 ENCOUNTER — APPOINTMENT (OUTPATIENT)
Dept: CT IMAGING | Facility: HOSPITAL | Age: 80
End: 2020-01-10

## 2020-01-10 LAB
ANION GAP SERPL CALCULATED.3IONS-SCNC: 7.1 MMOL/L (ref 5–15)
BACTERIA SPEC AEROBE CULT: ABNORMAL
BACTERIA SPEC AEROBE CULT: ABNORMAL
BUN BLD-MCNC: 20 MG/DL (ref 8–23)
BUN/CREAT SERPL: 31.3 (ref 7–25)
CALCIUM SPEC-SCNC: 9.3 MG/DL (ref 8.6–10.5)
CHLORIDE SERPL-SCNC: 101 MMOL/L (ref 98–107)
CK SERPL-CCNC: 38 U/L (ref 20–200)
CO2 SERPL-SCNC: 28.9 MMOL/L (ref 22–29)
CREAT BLD-MCNC: 0.64 MG/DL (ref 0.76–1.27)
DEPRECATED RDW RBC AUTO: 37.4 FL (ref 37–54)
ERYTHROCYTE [DISTWIDTH] IN BLOOD BY AUTOMATED COUNT: 12.2 % (ref 12.3–15.4)
GFR SERPL CREATININE-BSD FRML MDRD: 121 ML/MIN/1.73
GFR SERPL CREATININE-BSD FRML MDRD: 146 ML/MIN/1.73
GLUCOSE BLD-MCNC: 105 MG/DL (ref 65–99)
GLUCOSE BLDC GLUCOMTR-MCNC: 132 MG/DL (ref 70–130)
GLUCOSE BLDC GLUCOMTR-MCNC: 138 MG/DL (ref 70–130)
GLUCOSE BLDC GLUCOMTR-MCNC: 152 MG/DL (ref 70–130)
GLUCOSE BLDC GLUCOMTR-MCNC: 91 MG/DL (ref 70–130)
GRAM STN SPEC: ABNORMAL
HBA1C MFR BLD: 4.8 % (ref 4.8–5.6)
HCT VFR BLD AUTO: 31 % (ref 37.5–51)
HGB BLD-MCNC: 10.8 G/DL (ref 13–17.7)
ISOLATED FROM: ABNORMAL
MCH RBC QN AUTO: 29.6 PG (ref 26.6–33)
MCHC RBC AUTO-ENTMCNC: 34.8 G/DL (ref 31.5–35.7)
MCV RBC AUTO: 84.9 FL (ref 79–97)
PLATELET # BLD AUTO: 290 10*3/MM3 (ref 140–450)
PMV BLD AUTO: 8.8 FL (ref 6–12)
POTASSIUM BLD-SCNC: 4.2 MMOL/L (ref 3.5–5.2)
RBC # BLD AUTO: 3.65 10*6/MM3 (ref 4.14–5.8)
SODIUM BLD-SCNC: 137 MMOL/L (ref 136–145)
WBC NRBC COR # BLD: 10.35 10*3/MM3 (ref 3.4–10.8)

## 2020-01-10 PROCEDURE — 82962 GLUCOSE BLOOD TEST: CPT

## 2020-01-10 PROCEDURE — 97110 THERAPEUTIC EXERCISES: CPT

## 2020-01-10 PROCEDURE — 74178 CT ABD&PLV WO CNTR FLWD CNTR: CPT

## 2020-01-10 PROCEDURE — 25010000002 IOPAMIDOL 61 % SOLUTION: Performed by: HOSPITALIST

## 2020-01-10 PROCEDURE — 99232 SBSQ HOSP IP/OBS MODERATE 35: CPT | Performed by: INTERNAL MEDICINE

## 2020-01-10 PROCEDURE — 80048 BASIC METABOLIC PNL TOTAL CA: CPT | Performed by: INTERNAL MEDICINE

## 2020-01-10 PROCEDURE — 83036 HEMOGLOBIN GLYCOSYLATED A1C: CPT | Performed by: INTERNAL MEDICINE

## 2020-01-10 PROCEDURE — 82550 ASSAY OF CK (CPK): CPT | Performed by: HOSPITALIST

## 2020-01-10 PROCEDURE — 85027 COMPLETE CBC AUTOMATED: CPT | Performed by: INTERNAL MEDICINE

## 2020-01-10 PROCEDURE — 25810000003 SODIUM CHLORIDE 0.9 % WITH KCL 20 MEQ 20-0.9 MEQ/L-% SOLUTION: Performed by: INTERNAL MEDICINE

## 2020-01-10 PROCEDURE — 25010000003 CEFTRIAXONE PER 250 MG: Performed by: INTERNAL MEDICINE

## 2020-01-10 RX ORDER — TAMSULOSIN HYDROCHLORIDE 0.4 MG/1
0.4 CAPSULE ORAL 2 TIMES DAILY
Status: DISCONTINUED | OUTPATIENT
Start: 2020-01-10 | End: 2020-01-16 | Stop reason: HOSPADM

## 2020-01-10 RX ADMIN — LATANOPROST 1 DROP: 50 SOLUTION OPHTHALMIC at 21:15

## 2020-01-10 RX ADMIN — CEFTRIAXONE SODIUM 2 G: 2 INJECTION, SOLUTION INTRAVENOUS at 15:32

## 2020-01-10 RX ADMIN — ACETAMINOPHEN 650 MG: 325 TABLET, FILM COATED ORAL at 21:16

## 2020-01-10 RX ADMIN — PETROLATUM 100 APPLICATION: 420 OINTMENT TOPICAL at 10:15

## 2020-01-10 RX ADMIN — IOPAMIDOL 85 ML: 612 INJECTION, SOLUTION INTRAVENOUS at 15:10

## 2020-01-10 RX ADMIN — POTASSIUM CHLORIDE AND SODIUM CHLORIDE 75 ML/HR: 900; 150 INJECTION, SOLUTION INTRAVENOUS at 17:35

## 2020-01-10 RX ADMIN — ASPIRIN 81 MG: 81 TABLET, COATED ORAL at 10:15

## 2020-01-10 RX ADMIN — SODIUM CHLORIDE, PRESERVATIVE FREE 10 ML: 5 INJECTION INTRAVENOUS at 21:16

## 2020-01-10 RX ADMIN — LATANOPROST 1 DROP: 50 SOLUTION OPHTHALMIC at 03:29

## 2020-01-10 RX ADMIN — ACETAMINOPHEN 650 MG: 325 TABLET, FILM COATED ORAL at 03:29

## 2020-01-10 RX ADMIN — TAMSULOSIN HYDROCHLORIDE 0.4 MG: 0.4 CAPSULE ORAL at 21:15

## 2020-01-10 RX ADMIN — BRIMONIDINE TARTRATE 1 DROP: 2 SOLUTION OPHTHALMIC at 17:35

## 2020-01-10 RX ADMIN — BRIMONIDINE TARTRATE 1 DROP: 2 SOLUTION OPHTHALMIC at 21:15

## 2020-01-10 RX ADMIN — BRIMONIDINE TARTRATE 1 DROP: 2 SOLUTION OPHTHALMIC at 10:15

## 2020-01-10 RX ADMIN — ACETAMINOPHEN 650 MG: 325 TABLET, FILM COATED ORAL at 10:18

## 2020-01-10 RX ADMIN — PETROLATUM 100 APPLICATION: 420 OINTMENT TOPICAL at 21:16

## 2020-01-10 NOTE — DISCHARGE PLACEMENT REQUEST
"Salbador Solis (79 y.o. Male)     Date of Birth Social Security Number Address Home Phone MRN    1940  [address] [address]  [id number]    Confucianist Marital Status          Yarsanism        Admission Date Admission Type Admitting Provider Attending Provider Department, Room/Bed    1/8/20 Emergency Bradley Pearce MD Benton, John B, MD [address], [address]    Discharge Date Discharge Disposition Discharge Destination                       Attending Provider:  Gallo San MD    Allergies:  No Known Allergies    Isolation:  None   Infection:  None   Code Status:  CPR    Ht:  162.6 cm (64\")   Wt:  67.4 kg (148 lb 9.6 oz)    Admission Cmt:  None   Principal Problem:  Bacteremia due to Escherichia coli [R78.81]                 Active Insurance as of 1/8/2020     Primary Coverage     Payor Plan Insurance Group Employer/Plan Group    [employer] MEDICARE REPLACEMENT HUMANA [facility] HMO [facility] MEDICARE REPLACEMENT NON PAR [id number]     Payor Plan Address Payor Plan Phone Number Payor Plan Fax Number Effective Dates       1/1/2018 - None Entered    Subscriber Name Subscriber Birth Date Member ID       SALBADOR SOLIS 1940 [id number]                 Emergency Contacts      (Rel.) Home Phone Work Phone Mobile Phone    MARTIN GOLDEN (Daughter) -- -- [phone]              "

## 2020-01-10 NOTE — PROGRESS NOTES
Discharge Planning Assessment  [facility]     Patient Name: Raymon Solis  MRN: [id number]  Today's Date: 1/10/2020    Admit Date: 1/8/2020    Discharge Needs Assessment    No documentation.       Discharge Plan     Row Name 01/10/20 1040       Plan    Plan  SNF referral pending, will need [employer] Medicare pre-cert    Patient/Family in Agreement with Plan  yes    Plan Comments  CCP followed up with pt's daughter/emergency contact on file (Layla Abrams, [phone]) to obtain SNF options for referral. Daughter selected [facility] and will follow up with back up options later today. CCP made referral and will follow for eval. Pt will need [employer] Medicare pre-cert for rehab. Tania Greene LCSW        Destination      Service Provider Request Status Selected Services Address Phone Number Fax Number    [facility] Pending - Request Sent N/A [address] , [facility] [address] [phone]      Durable Medical Equipment      Coordination has not been started for this encounter.      Dialysis/Infusion      Coordination has not been started for this encounter.      Home Medical Care      Coordination has not been started for this encounter.      Therapy      Coordination has not been started for this encounter.      Community Resources      Coordination has not been started for this encounter.          Demographic Summary    No documentation.       Functional Status    No documentation.       Psychosocial    No documentation.       Abuse/Neglect    No documentation.       Legal    No documentation.       Substance Abuse    No documentation.       Patient Forms    No documentation.           Wendy Greene LCSW

## 2020-01-10 NOTE — NURSING NOTE
Patient still not voiding on his own and no urge to go.  Bladder scan 550 ml and bladder feels distended on palpation.  Straight cath performed and 300 of clear yellow urine obtained.  Call out to Dr. San to make him awake patient still not voiding on own.

## 2020-01-10 NOTE — PROGRESS NOTES
Continued Stay Note  [facility]     Patient Name: Raymon Solis  MRN: [id number]  Today's Date: 1/10/2020    Admit Date: 1/8/2020    Discharge Plan     Row Name 01/10/20 1547       Plan    Plan  SNF - awaiting accepting facility, will need [employer] pre-cert    Patient/Family in Agreement with Plan  yes    Plan Comments  Received call from Lavonne/Madelin Garcia at [facility] is not in network with patient's insurance.  Spoke with daughter Layla by telephone, she is interested in [facility] - spoke with Yulia and not in network.  Daughter will look at other facilities and let CCP know.  CCP will continue to follow.  [place] RN Becky S. [place], RN

## 2020-01-10 NOTE — PLAN OF CARE
Problem: Patient Care Overview  Goal: Plan of Care Review  Flowsheets (Taken 1/10/2020 0936)  Outcome Summary: Improved activity tolerance during PT today.  Able to take few shuffling steps bed>chair w/ min A x2 using RW; VC for improving gait pattern.  Recommend DC to SNU.

## 2020-01-10 NOTE — CONSULTS
FIRST UROLOGY CONSULT      Patient Identification:  NAME:  Raymon Solis  Age:  79 y.o.   Sex:  male   :  1940   MRN:  [id number]       Chief complaint: Urinary retention    History of present illness: Mr. Solis is a 79-year-old gentleman I saw him in May of this past year for retentive urinary symptoms and a UTI.  He was treated with antibiotics he was told to come into the office for follow-up but he never followed up with us and he is been brought back in for various other issues.  He remains on intermittent catheterization.  He has poor care and does not have good hygiene.  He apparently was quite unkempt when he was admitted.  I had talked to son of his the last time and apparently his daughter is a nurse here at this facility but I have not been able to talk to her.  Question is his compliance and what were to do about getting him to void again.  He does not speak rating this is very broken but he seems understand me.      Past medical history:  Past Medical History:   Diagnosis Date   • Aortic stenosis    • Diabetes mellitus (CMS/[facility])    • Hypertension        Past surgical history:  Past Surgical History:   Procedure Laterality Date   • CATARACT EXTRACTION, BILATERAL         Allergies:  Patient has no known allergies.    Home medications:  Medications Prior to Admission   Medication Sig Dispense Refill Last Dose   • amLODIPine (NORVASC) 5 MG tablet Take 1 tablet by mouth Daily. 30 tablet 0 More than a month at Unknown time   • aspirin 81 MG EC tablet Take 81 mg by mouth Daily.   More than a month at Unknown time   • brimonidine (ALPHAGAN) 0.2 % ophthalmic solution Administer 1 drop to both eyes 3 (Three) Times a Day.   More than a month at Unknown time   • furosemide (LASIX) 40 MG tablet Take 0.5 tablets by mouth Daily.   More than a month at Unknown time   • glipiZIDE (GLUCOTROL) 10 MG tablet Take 10 mg by mouth 2 (Two) Times a Day Before Meals.   More than a month at Unknown time   •  latanoprost (XALATAN) 0.005 % ophthalmic solution Administer 1 drop to both eyes Every Night.   More than a month at Unknown time   • lisinopril (PRINIVIL,ZESTRIL) 10 MG tablet Take 1 tablet by mouth Daily. 30 tablet 0 More than a month at Unknown time   • metFORMIN (GLUCOPHAGE) 1000 MG tablet Take 1,000 mg by mouth 2 (Two) Times a Day With Meals.   More than a month at Unknown time   • potassium chloride (K-DUR) 10 MEQ CR tablet Take 1 tablet by mouth Daily. 30 tablet 0 More than a month at Unknown time   • tamsulosin (FLOMAX) 0.4 MG capsule 24 hr capsule Take 1 capsule by mouth Every Night. 30 capsule 0 More than a month at Unknown time        Hospital medications:    aspirin 81 mg Oral Daily   brimonidine 1 drop Both Eyes TID   cefTRIAXone 2 g Intravenous Q24H   insulin lispro 0-9 Units Subcutaneous 4x Daily With Meals & Nightly   latanoprost 1 drop Both Eyes Nightly   Petrolatum  Topical Q12H   sodium chloride 10 mL Intravenous Q12H   tamsulosin 0.4 mg Oral BID       sodium chloride 0.9 % with KCl 20 mEq 75 mL/hr Last Rate: 75 mL/hr (01/10/20 8574)     •  acetaminophen **OR** acetaminophen **OR** acetaminophen  •  dextrose  •  dextrose  •  glucagon (human recombinant)  •  ipratropium-albuterol  •  nitroglycerin  •  ondansetron  •  potassium chloride **OR** potassium chloride **OR** potassium chloride  •  [COMPLETED] Insert peripheral IV **AND** sodium chloride  •  sodium chloride    Family history:  History reviewed. No pertinent family history.    Social history:  Social History     Tobacco Use   • Smoking status: Never Smoker   • Smokeless tobacco: Never Used   Substance Use Topics   • Alcohol use: No     Frequency: Never   • Drug use: No       Review of systems:    Negative 12-system ROS except for the following: Difficult to get a good history and physical and review of systems from this gentleman due to his broken English.      Objective:  TMax 24 hours:   Temp (24hrs), Av.7 °F (36.5 °C), Min:97.6 °F  (36.4 °C), Max:97.9 °F (36.6 °C)      Vitals Ranges:   Temp:  [97.6 °F (36.4 °C)-97.9 °F (36.6 °C)] 97.6 °F (36.4 °C)  Heart Rate:  [65-89] 79  Resp:  [10-18] 16  BP: (122-141)/(62-74) 122/62    Intake/Output Last 3 shifts:  I/O last 3 completed shifts:  In: 800 [P.O.:800]  Out: 1100 [Urine:1100]     Physical Exam:       General Appearance:    Alert, cooperative, in no acute distress   Head:    Normocephalic, without obvious abnormality, atraumatic   Eyes:          PERRL, conjunctivae and corneas clear   Ears:    Normal external inspection   Throat:   No oral lesions, oral mucosa moist   Neck:   Supple, no LAD, trachea midline   Back:     No CVA tenderness   Lungs:     Respirations unlabored, symmetric excursion    Heart:    RRR, intact peripheral pulses   Abdomen:    Soft and benign.   :   Penis normal testes normal   AMADOR:  Extremities:  Deferred at this point.  No edema, no deformity   Skin:   No bleeding, bruising or rashes   Neuro/Psych:   Orientation intact, mood/affect pleasant, no focal findings       Results review:   I reviewed the patient's new clinical results.    Data review:  Lab Results (last 24 hours)     Procedure Component Value Units Date/Time    Blood Culture - Blood, Arm, Right [[id number]] Collected:  01/09/20 1641    Specimen:  Blood from Arm, Right Updated:  01/10/20 1700     Blood Culture No growth at 24 hours    POC Glucose Once [[id number]]  (Abnormal) Collected:  01/10/20 1619    Specimen:  Blood Updated:  01/10/20 1621     Glucose 132 mg/dL     Blood Culture - Blood, Arm, Left [[id number]] Collected:  01/09/20 1308    Specimen:  Blood from Arm, Left Updated:  01/10/20 1345     Blood Culture No growth at 24 hours    Blood Culture - Blood, Arm, Left [[id number]] Collected:  01/08/20 1237    Specimen:  Blood from Arm, Left Updated:  01/10/20 1245     Blood Culture No growth at 2 days    POC Glucose Once [[id number]]  (Abnormal) Collected:  01/10/20 1131    Specimen:  Blood Updated:  01/10/20  1149     Glucose 138 mg/dL     CK [[id number]]  (Normal) Collected:  01/10/20 0642    Specimen:  Blood from Hand, Right Updated:  01/10/20 0806     Creatine Kinase 38 U/L     Basic Metabolic Panel [[id number]]  (Abnormal) Collected:  01/10/20 0642    Specimen:  Blood from Hand, Right Updated:  01/10/20 0731     Glucose 105 mg/dL      BUN 20 mg/dL      Creatinine 0.64 mg/dL      Sodium 137 mmol/L      Potassium 4.2 mmol/L      Chloride 101 mmol/L      CO2 28.9 mmol/L      Calcium 9.3 mg/dL      eGFR  African Amer 146 mL/min/1.73      eGFR Non African Amer 121 mL/min/1.73      BUN/Creatinine Ratio 31.3     Anion Gap 7.1 mmol/L     Narrative:       GFR Normal >60  Chronic Kidney Disease <60  Kidney Failure <15      Hemoglobin A1c [[id number]]  (Normal) Collected:  01/10/20 0642    Specimen:  Blood from Hand, Right Updated:  01/10/20 0703     Hemoglobin A1C 4.80 %     Narrative:       Hemoglobin A1C Ranges:    Increased Risk for Diabetes  5.7% to 6.4%  Diabetes                     >= 6.5%  Diabetic Goal                < 7.0%    CBC (No Diff) [[id number]]  (Abnormal) Collected:  01/10/20 0642    Specimen:  Blood from Hand, Right Updated:  01/10/20 0701     WBC 10.35 10*3/mm3      RBC 3.65 10*6/mm3      Hemoglobin 10.8 g/dL      Hematocrit 31.0 %      MCV 84.9 fL      MCH 29.6 pg      MCHC 34.8 g/dL      RDW 12.2 %      RDW-SD 37.4 fl      MPV 8.8 fL      Platelets 290 10*3/mm3     POC Glucose Once [[id number]]  (Normal) Collected:  01/10/20 0638    Specimen:  Blood Updated:  01/10/20 0639     Glucose 91 mg/dL     Blood Culture - Blood, Arm, Left [[id number]]  (Abnormal) Collected:  01/08/20 1316    Specimen:  Blood from Arm, Left Updated:  01/10/20 0545     Blood Culture Gram Negative Bacilli     Isolated from Anaerobic Bottle     Gram Stain Anaerobic Bottle Gram negative bacilli    Urine Culture - Urine, Urine, Catheter [[id number]]  (Abnormal)  (Susceptibility) Collected:  01/08/20 1349    Specimen:  Urine, Catheter Updated:   01/10/20 0231     Urine Culture >100,000 CFU/mL Escherichia coli    Narrative:             Susceptibility      Escherichia coli     VENKAT     Ampicillin Resistant     Ampicillin + Sulbactam Intermediate     Cefazolin Susceptible     Cefepime Susceptible     Ceftazidime Susceptible     Ceftriaxone Susceptible     Gentamicin Susceptible     Levofloxacin Intermediate     Nitrofurantoin Susceptible     Piperacillin + Tazobactam Susceptible     Tetracycline Susceptible     Trimethoprim + Sulfamethoxazole Susceptible                    Potassium [[id number]]  (Normal) Collected:  01/09/20 2113    Specimen:  Blood Updated:  01/09/20 2140     Potassium 4.1 mmol/L     POC Glucose Once [[id number]]  (Normal) Collected:  01/09/20 2120    Specimen:  Blood Updated:  01/09/20 2123     Glucose 112 mg/dL            Imaging:  Imaging Results (Last 24 Hours)     Procedure Component Value Units Date/Time    CT Abdomen Pelvis With & Without Contrast [[id number]] Collected:  01/10/20 1813     Updated:  01/10/20 1813    Narrative:       ABDOMEN AND PELVIS CT WITHOUT AND WITH IV CONTRAST     HISTORY: Follow-up renal lesion.     TECHNIQUE: Precontrast and multiphasic post IV contrast CT of the  abdomen and pelvis is provided. 85 mL Isovue-300 contrast was used. This  is correlated with a CT scan from 06/13/2019 and a renal ultrasound  performed yesterday. Radiation dose reduction techniques were utilized,  including automated exposure control and exposure modulation based on  body size.     FINDINGS: There is diffuse third spacing along the body wall of the  lower chest and throughout the abdomen and pelvis. There is diffuse  mesenteric edema. Small bilateral pleural effusions layer posteriorly at  both lung bases. There is some linear, patchy atelectasis or possibly  pneumonia at the lung bases.     At the upper pole of the left kidney, there is an approximately 20 mm  renal lesion. This lesion appears hyperdense on precontrast  images,  measuring up to almost 70 Hounsfield units. It does not enhance  following contrast administration and is consistent with a hemorrhagic  cyst. There is also a simple cyst at the upper pole of the right kidney,  about 31 mm diameter.     There is abnormally diminished enhancement of the cortex along the  lateral half of the right renal upper pole, consistent with  pyelonephritis.  There is normal enhancement elsewhere throughout the renal cortex  bilaterally. There is no hydronephrosis. There is a 2 mm nonobstructing  calyceal calculus at the upper midpole collecting system of the right  kidney and there is a 3 mm right mid ureteral calculus, nonobstructing.  No hydronephrosis is present. At the left kidney, there is a 2 mm  nonobstructing calyceal calculus posteriorly and a 4 or 5 mm calculus  laterally along the upper midpole.     Parenchyma of the liver, spleen, pancreas, and adrenals appears normal.  The gallbladder is in situ and contains gallstones. There is no  gallbladder wall thickening or dilatation. The aorta is partly calcified  but normal in caliber. The bowel appears normal. There is no  lymphadenopathy.       Impression:       Lesion at the upper pole of the left kidney represents a  proteinaceous or hemorrhagic cyst. There is abnormally diminished  enhancement of the cortex along the lateral upper pole of the right  kidney which is new in comparison to CT from 06/13/2019 and is  consistent with pyelonephritis. There is a nonobstructing 3 mm calculus  at the mid right ureter. There is also diffuse third spacing, small  bilateral pleural effusions, and some hazy opacity in the posterior  lower lobes bilaterally representing either atelectasis or pneumonia.       US Renal Bilateral [[id number]] Collected:  01/09/20 1628     Updated:  01/09/20 2147    Narrative:       RENAL ULTRASOUND     HISTORY: Evaluate for hydronephrosis     COMPARISON: CT abdomen and pelvis 06/13/2019     TECHNIQUE:  Grayscale, color Doppler images of the kidneys and bladder  were obtained.      FINDINGS:   Grayscale and color Doppler images of the kidneys and bladder were  obtained.     The right kidney measures 12.3 cm in length. Findings of a simple cysts  are present within the superior pole the right kidney, best on recent  CT. No demonstrable internal color Doppler flow is present.     The left kidney measures 11 cm in length.      The kidneys demonstrate normal echogenicity and cortical thickness.  There is no hydronephrosis. Multiple calculi are present within the left  kidney, measuring up to 0.5 cm. The 1.4 cm hypoechoic lesion is  visualized within the superior pole of the left kidney and corresponds  with the indeterminate lesion seen on CT 06/13/2019. Its wall appears to  be peripherally calcified, as before.     Layering debris is present within the bladder. There is no demonstrable  internal color Doppler flow.     Visualized portions of the aorta and IVC are normal in caliber and  appearance.     Incidental note is made of bilateral pleural effusions.       Impression:       1.  No findings of hydronephrosis. Left nephrolithiasis and layering  debris within the bladder.  2.  Hypoechoic lesion within the superior pole of left kidney which  corresponds with the indeterminate lesion seen on recent CTs and remains  concerning for underlying solid neoplasm. Further evaluation with  multiphase CT of the abdomen with and without contrast is recommended to  further characterize.  3.  Small bilateral pleural effusions.  4.  Other findings as above.     This report was finalized on 1/9/2020 9:44 PM by Dr. Parker Roe M.D.                Assessment:       Bacteremia due to Escherichia coli    HTN (hypertension)    Type 2 diabetes mellitus, without long-term current use of insulin (CMS/HCC)    Hypokalemia    Elevated alkaline phosphatase level    Aortic stenosis    Elevated troponin    Sepsis without acute organ  dysfunction (CMS/HCC)    History of noncompliance with medical treatment    E. coli UTI (urinary tract infection)    Acute urinary retention    Left lower lobe pulmonary infiltrate    Non-traumatic rhabdomyolysis    Lesion of left native kidney    Metabolic encephalopathy    Urinary retention secondary to baseline BPH and exacerbated by recent events outside the facility  Mildly complex renal cyst left kidney    Plan:     We will increase Flomax to 2 times a day but if this does not get him opened up his only option is probably to have a TURP to avoid catheter placement.  I will try to talk to his daughter.    Steven Steel MD  01/10/20  6:34 PM

## 2020-01-10 NOTE — PROGRESS NOTES
LOS: 2 days     Chief Complaint:  Follow-up E coli septicemia due to  source    Interval History:  No fever. WBC normalized. He reports some back pain from positioning in the chair. Tolerating ceftriaxone w/o rash or diarrhea. Per the RN note, he required I/O cath with nearly 400 cc of purulent appearing urine removed.     ROS: no CP or SOA    Vital Signs  Temp:  [97.7 °F (36.5 °C)-97.9 °F (36.6 °C)] 97.9 °F (36.6 °C)  Heart Rate:  [65-89] 71  Resp:  [10-18] 10  BP: (110-141)/(65-74) 128/74    Physical Exam:  General: awake, alert, in chair  Head: Normocephalic, grey beard  Eyes: no scleral icterus  ENT: MMM, edentulous  Cardiovascular: NR, RR, 3/6 JODY loudest at RUSB, no LE edema  Respiratory:  normal work of breathing on ambient air  GI: Abdomen is soft, non-tender, non-distended  Skin: No rashes  Psychiatric: calm, pleasant    Antibiotics:  •  cefTRIAXone (ROCEPHIN) IVPB 2 g, 2 g, Intravenous, Q24H    LABS:  CBC, BMP, A1c, micro reviewed today  Lab Results   Component Value Date    WBC 10.35 01/10/2020    HGB 10.8 (L) 01/10/2020    HCT 31.0 (L) 01/10/2020    MCV 84.9 01/10/2020     01/10/2020     Lab Results   Component Value Date    GLUCOSE 105 (H) 01/10/2020    BUN 20 01/10/2020    CREATININE 0.64 (L) 01/10/2020    EGFRIFNONA 121 01/10/2020    EGFRIFAFRI 146 01/10/2020    BCR 31.3 (H) 01/10/2020    CO2 28.9 01/10/2020    CALCIUM 9.3 01/10/2020    ALBUMIN 2.50 (L) 01/09/2020    LABIL2 0.8 (L) 11/06/2019    AST 14 01/09/2020    ALT 5 01/09/2020     Lab Results   Component Value Date    HGBA1C 4.80 01/10/2020     Microbiology:  1/8 RVP: negative  1/8 BCx: E coli (sensitivities pending)  1/8 UCx: 100k E coli (sensitive to ceftriaxone, cefazolin, Zosyn, bactrim; intermediate to levofloxacin; resistant to ampicillin)  1/9 BCx: pending    Radiology (personally reviewed report):   Renal US negative for hydronephrosis; however left renal hypoechoic lesion still present and radiologist recommends CT  scan    Assessment/Plan   1. E coli septicemia due to  source  2.  Controlled DM2  3 Aortic stenosis  4. Left renal mass     No fever and WBC normalized with ceftriaxone 2 g IV q24h. I plan to continue it for a 7-day course with stop date 1/14/20 if he continues to have the same rate of improvement. Due to the left renal mass seen on US, I will order a CT A/P with IV contrast for further evaluation. Patient tells me he never saw a urologist after his discharge on June 2019.     Thank you for this consult. ID will follow. D/W CCP. Patient plans to discharge to SNF.

## 2020-01-10 NOTE — THERAPY TREATMENT NOTE
Patient Name: Raymon Solis  : 1940    MRN: [id number]                              Today's Date: 1/10/2020       Admit Date: 2020    Visit Dx:     ICD-10-CM ICD-9-CM   1. Sepsis without acute organ dysfunction, due to unspecified organism (CMS/[facility]) A41.9 038.9     995.91   2. Pneumonia of left lower lobe due to infectious organism (CMS/[facility]) J18.1 486   3. Atrial fibrillation, unspecified type (CMS/[facility]) I48.91 427.31     Patient Active Problem List   Diagnosis   • Acute pyelonephritis   • HTN (hypertension)   • Type 2 diabetes mellitus, without long-term current use of insulin (CMS/[facility])   • Hypokalemia   • Elevated alkaline phosphatase level   • Aortic stenosis   • Acute UTI   • Wandering atrial pacemaker   • Elevated troponin   • Sepsis without acute organ dysfunction (CMS/[facility])   • History of noncompliance with medical treatment   • E. coli UTI (urinary tract infection)   • Bacteremia due to Escherichia coli   • Acute urinary retention   • Left lower lobe pulmonary infiltrate   • Non-traumatic rhabdomyolysis   • Lesion of left native kidney   • Metabolic encephalopathy     Past Medical History:   Diagnosis Date   • Aortic stenosis    • Diabetes mellitus (CMS/[facility])    • Hypertension      Past Surgical History:   Procedure Laterality Date   • CATARACT EXTRACTION, BILATERAL       General Information     Row Name 01/10/20 0934          PT Evaluation Time/Intention    Document Type  therapy note (daily note)  -AR     Mode of Treatment  physical therapy  -AR     Row Name 01/10/20 0934          General Information    Patient Profile Reviewed?  yes  -AR     Existing Precautions/Restrictions  fall  -AR     Barriers to Rehab  -- speaks some English, no difficulty w/ communication during PT today  -AR     Row Name 01/10/20 0934          Cognitive Assessment/Intervention- PT/OT    Orientation Status (Cognition)  oriented to;person;place  -AR     Row Name 01/10/20 0934          Safety Issues, Functional Mobility     Impairments Affecting Function (Mobility)  balance;endurance/activity tolerance  -AR       User Key  (r) = Recorded By, (t) = Taken By, (c) = Cosigned By    Initials Name Provider Type    Laisha Schuster PT Physical Therapist        Mobility     Row Name 01/10/20 0934          Bed Mobility Assessment/Treatment    Bed Mobility Assessment/Treatment  supine-sit;sit-supine  -AR     Supine-Sit [place] (Bed Mobility)  moderate assist (50% patient effort)  -AR     Sit-Supine [place] (Bed Mobility)  not tested  -AR     Assistive Device (Bed Mobility)  bed rails;draw sheet;head of bed elevated  -AR     Row Name 01/10/20 0934          Sit-Stand Transfer    Sit-Stand [place] (Transfers)  minimum assist (75% patient effort);2 person assist  -AR     Assistive Device (Sit-Stand Transfers)  walker, front-wheeled  -AR     Row Name 01/10/20 0934          Gait/Stairs Assessment/Training    Gait/Stairs Assessment/Training  gait/ambulation independence  -AR     [place] Level (Gait)  minimum assist (75% patient effort);2 person assist  -AR     Assistive Device (Gait)  walker, front-wheeled  -AR     Distance in Feet (Gait)  3' slow shuffling steps bed>chair w/ cues for posture and safety w/ UE placement and using RW  -AR     Pattern (Gait)  swing-to  -AR     Deviations/Abnormal Patterns (Gait)  reese decreased;festinating/shuffling  -AR     Bilateral Gait Deviations  heel strike decreased;forward flexed posture  -AR       User Key  (r) = Recorded By, (t) = Taken By, (c) = Cosigned By    Initials Name Provider Type    Laisha Schuster PT Physical Therapist        Obj/Interventions     Row Name 01/10/20 0936          Static Sitting Balance    Level of [place] (Unsupported Sitting, Static Balance)  contact guard assist  -AR     Sitting Position (Unsupported Sitting, Static Balance)  sitting on edge of bed  -AR     Time Able to Maintain Position (Unsupported Sitting, Static Balance)  3 to 4 minutes  -AR      Row Name 01/10/20 0936          Dynamic Sitting Balance    Level of [place], Reaches Outside Midline (Sitting, Dynamic Balance)  minimal assist, 75% patient effort  -AR     Sitting Position, Reaches Outside Midline (Sitting, Dynamic Balance)  sitting on edge of bed  -AR     Row Name 01/10/20 0936          Static Standing Balance    Level of [place] (Supported Standing, Static Balance)  minimal assist, 75% patient effort  -AR     Assistive Device Utilized (Supported Standing, Static Balance)  walker, rolling  -AR     Row Name 01/10/20 0936          Dynamic Standing Balance    Level of [place], Reaches Outside Midline (Standing, Dynamic Balance)  minimal assist, 75% patient effort;2 person assist  -AR     Assistive Device Utilized (Supported Standing, Dynamic Balance)  walker, rolling  -AR       User Key  (r) = Recorded By, (t) = Taken By, (c) = Cosigned By    Initials Name Provider Type    AR Laisha Washington, PT Physical Therapist        Goals/Plan    No documentation.       Clinical Impression     Row Name 01/10/20 0936          Pain Assessment    Additional Documentation  Pain Scale: Numbers Pre/Post-Treatment (Group)  -AR     [facility] Name 01/10/20 0936          Pain Scale: Numbers Pre/Post-Treatment    Pre/Post Treatment Pain Comment  reports some back pain, unable to give number due to lethargy, reports feels better in chair  -AR     Pain Intervention(s)  Medication (See MAR);Repositioned  -AR     Row Name 01/10/20 0936          Plan of Care Review    Plan of Care Reviewed With  patient  -AR     Outcome Summary  Improved activity tolerance during PT today.  Able to take few shuffling steps bed>chair w/ min A x2 using RW; VC for improving gait pattern.  Recommend DC to SNU.    -AR     Row Name 01/10/20 0936          Physical Therapy Clinical Impression    Criteria for Skilled Interventions Met (PT Clinical Impression)  yes  -AR     Rehab Potential (PT Clinical Summary)  good, to achieve stated  therapy goals  -AR     Row Name 01/10/20 0936          Vital Signs    O2 Delivery Pre Treatment  room air  -AR     O2 Delivery Intra Treatment  room air  -AR     O2 Delivery Post Treatment  room air  -AR       User Key  (r) = Recorded By, (t) = Taken By, (c) = Cosigned By    Initials Name Provider Type    Laisha Schuster, PT Physical Therapist        Outcome Measures     Row Name 01/10/20 0937          How much help from another person do you currently need...    Turning from your back to your side while in flat bed without using bedrails?  2  -AR     Moving from lying on back to sitting on the side of a flat bed without bedrails?  2  -AR     Moving to and from a bed to a chair (including a wheelchair)?  2  -AR     Standing up from a chair using your arms (e.g., wheelchair, bedside chair)?  2  -AR     Climbing 3-5 steps with a railing?  1  -AR     To walk in hospital room?  2  -AR     AM-PAC 6 Clicks Score (PT)  11  -AR     Row Name 01/10/20 0937          Functional Assessment    Outcome Measure Options  AM-PAC 6 Clicks Basic Mobility (PT)  -AR       User Key  (r) = Recorded By, (t) = Taken By, (c) = Cosigned By    Initials Name Provider Type    Laisha Schuster, PT Physical Therapist          PT Recommendation and Plan     Outcome Summary/Treatment Plan (PT)  Anticipated Discharge Disposition (PT): skilled nursing facility  Plan of Care Reviewed With: patient  Outcome Summary: Improved activity tolerance during PT today.  Able to take few shuffling steps bed>chair w/ min A x2 using RW; VC for improving gait pattern.  Recommend DC to SNU.       Time Calculation:   PT Charges     Row Name 01/10/20 0933             Time Calculation    Start Time  0917  -AR      Stop Time  0933  -AR      Time Calculation (min)  16 min  -AR      PT Received On  01/10/20  -AR      PT - Next Appointment  01/11/20  -AR        User Key  (r) = Recorded By, (t) = Taken By, (c) = Cosigned By    Initials Name Provider Type    [place]  Laisha, AMY Physical Therapist        Therapy Charges for Today     Code Description Service Date Service Provider Modifiers Qty    40764178301 HC PT THER PROC EA 15 MIN 1/10/2020 Lasiha Washington, PT GP 1    66975390334 HC PT THER SUPP EA 15 MIN 1/10/2020 Laisha Washington PT GP 1          PT G-Codes  Outcome Measure Options: AM-PAC 6 Clicks Basic Mobility (PT)  AM-PAC 6 Clicks Score (PT): 11  AM-PAC 6 Clicks Score (OT): 9    Laisha Washington PT  1/10/2020

## 2020-01-10 NOTE — PROGRESS NOTES
"   LOS: 2 days   Patient Care Team:  Magali Burgess MD as PCP - General (Internal Medicine)    Chief Complaint: tired    Subjective     Very tired at present. Just back from CT. RN says overall he is much improved from yesterday. RN reports he is having some elevated PVRs and requiring intermittent straight cath      Subjective:  Symptoms:  Improved.  He reports malaise, weakness and anorexia.  No shortness of breath, cough, chest pain, headache, chest pressure, diarrhea or anxiety.    Diet:  Poor intake.  No nausea or vomiting.    Activity level: Impaired due to weakness.    Pain:  He reports no pain.        History taken from: patient chart RN    Objective     Vital Signs  Temp:  [97.6 °F (36.4 °C)-97.9 °F (36.6 °C)] 97.6 °F (36.4 °C)  Heart Rate:  [65-89] 79  Resp:  [10-18] 16  BP: (122-141)/(62-74) 122/62    Objective:  General Appearance:  Comfortable, in no acute distress and ill-appearing.    Vital signs: (most recent): Blood pressure 122/62, pulse 79, temperature 97.6 °F (36.4 °C), temperature source Oral, resp. rate 16, height 162.6 cm (64\"), weight 67.4 kg (148 lb 9.6 oz), SpO2 97 %.  Vital signs are normal.  No fever.    Output: Producing urine.    HEENT: Normal HEENT exam.  (Edentulous)    Lungs:  Normal effort and normal respiratory rate.  Breath sounds clear to auscultation.  (Anteriorly)  Heart: Normal rate.  Regular rhythm.  Positive for murmur (3-4/6).    Abdomen: Abdomen is soft.  Bowel sounds are normal.   There is no abdominal tenderness.     Extremities: There is no dependent edema.    Pulses: Distal pulses are intact.    Neurological: Patient is alert.  Patient has normal muscle tone.  (Very pleasant).    Skin:  Warm and dry.              Results Review:     I reviewed the patient's new clinical results.  I reviewed the patient's new imaging results and agree with the interpretation.  I reviewed the patient's other test results and agree with the interpretation  I personally viewed and " interpreted the patient's EKG/Telemetry data  Discussed with pt and RN    Results from last 7 days   Lab Units 01/10/20  0642 01/09/20  0553 01/08/20  1223   WBC 10*3/mm3 10.35 18.46* 27.53*   HEMOGLOBIN g/dL 10.8* 12.3* 14.7   PLATELETS 10*3/mm3 290 389 448     Results from last 7 days   Lab Units 01/10/20  0642 01/09/20  2113 01/09/20  0845 01/09/20  0553 01/08/20  2339 01/08/20  1223   SODIUM mmol/L 137  --   --  141  --  144   POTASSIUM mmol/L 4.2 4.1 3.4* 3.4* 3.2* 2.7*   CHLORIDE mmol/L 101  --   --  99  --  102   CO2 mmol/L 28.9  --   --  29.6*  --  26.3   BUN mg/dL 20  --   --  19  --  15   CREATININE mg/dL 0.64*  --   --  0.83  --  0.71*   CALCIUM mg/dL 9.3  --   --  9.6  --  8.5*   MAGNESIUM mg/dL  --   --   --   --   --  1.6   Estimated Creatinine Clearance: 71.4 mL/min (A) (by C-G formula based on SCr of 0.64 mg/dL (L)).    Medication Review: reviewed    Assessment/Plan       Bacteremia due to Escherichia coli    HTN (hypertension)    Type 2 diabetes mellitus, without long-term current use of insulin (CMS/[facility])    Hypokalemia    Elevated alkaline phosphatase level    Aortic stenosis    Elevated troponin    Sepsis without acute organ dysfunction (CMS/[facility])    History of noncompliance with medical treatment    E. coli UTI (urinary tract infection)    Acute urinary retention    Left lower lobe pulmonary infiltrate    Non-traumatic rhabdomyolysis    Lesion of left native kidney    Metabolic encephalopathy          Plan:   (79 y.o. male admitted with possible PNA/LLL infiltrate.  Urine culture and blood cultures grew Ecoli here.  Patient laid on the ground for approximately 12 hours prior to family finding him and calling EMS     EColi bacteremia secondary to  source  · Appreciate ID assistance.    · Continue Rocephin 2g Q24h through 1/14 per ID, stopped azithromycin. RVP is negative  · WBC normalized, he is afebrile  · Patient was admitted to this facility in June 2019 with pyelonephritis with urine cultures  growing E. Coli.      Elevated troponin/mild rhabdomyolysis  · He has no cardiac symptoms.  He has chronically elevated troponin at baseline on admission.   · Troponin increase here is minimal in the setting of elevated CK and myoglobinuria. Repeat CK and LFTs normalized. Monitor electrolytes.   · Evaluated by Cardiology in June 2019 for elevated troponin.    · Echo reviewed   · Replacing K as needed     Left renal lesion  · CT A/P in June 2019 demonstrated left kidney lesion concerning for neoplasm with recommended follow-up with multiphase CT abdomen with and without contrast. Renal lesion persists on U/S. ID has ordered CT A/P with IV contrast. Will consult  (pt never f/u with  after last admission in June as instructed)     DM2  · Monitor glucose  · Correctional humalog only here, was on Metformin and Glipizide PTA, would not continue after discharge, not requiring any insulin here  · HgbA1c only 4.8, no hypoglycemia noted here     Suspected dementia/metabolic encephalopathy  · Encephalopathy appears to be improving today  · SLP recommends mechanical soft with thin liquids     · SCDs for DVT prophylaxis.  · Full code.  · Anticipate discharge to SNF. Precert pending   ).       Gallo San MD  01/10/20  2:44 PM    Time: 25min

## 2020-01-11 LAB
ANION GAP SERPL CALCULATED.3IONS-SCNC: 5.9 MMOL/L (ref 5–15)
BUN BLD-MCNC: 18 MG/DL (ref 8–23)
BUN/CREAT SERPL: 30.5 (ref 7–25)
CALCIUM SPEC-SCNC: 9.1 MG/DL (ref 8.6–10.5)
CHLORIDE SERPL-SCNC: 104 MMOL/L (ref 98–107)
CO2 SERPL-SCNC: 28.1 MMOL/L (ref 22–29)
CREAT BLD-MCNC: 0.59 MG/DL (ref 0.76–1.27)
DEPRECATED RDW RBC AUTO: 40.3 FL (ref 37–54)
ERYTHROCYTE [DISTWIDTH] IN BLOOD BY AUTOMATED COUNT: 12.5 % (ref 12.3–15.4)
GFR SERPL CREATININE-BSD FRML MDRD: 133 ML/MIN/1.73
GFR SERPL CREATININE-BSD FRML MDRD: >150 ML/MIN/1.73
GLUCOSE BLD-MCNC: 142 MG/DL (ref 65–99)
GLUCOSE BLDC GLUCOMTR-MCNC: 107 MG/DL (ref 70–130)
GLUCOSE BLDC GLUCOMTR-MCNC: 126 MG/DL (ref 70–130)
GLUCOSE BLDC GLUCOMTR-MCNC: 126 MG/DL (ref 70–130)
GLUCOSE BLDC GLUCOMTR-MCNC: 136 MG/DL (ref 70–130)
HCT VFR BLD AUTO: 29.7 % (ref 37.5–51)
HGB BLD-MCNC: 9.7 G/DL (ref 13–17.7)
MCH RBC QN AUTO: 29 PG (ref 26.6–33)
MCHC RBC AUTO-ENTMCNC: 32.7 G/DL (ref 31.5–35.7)
MCV RBC AUTO: 88.7 FL (ref 79–97)
PLATELET # BLD AUTO: 281 10*3/MM3 (ref 140–450)
PMV BLD AUTO: 9 FL (ref 6–12)
POTASSIUM BLD-SCNC: 4.3 MMOL/L (ref 3.5–5.2)
RBC # BLD AUTO: 3.35 10*6/MM3 (ref 4.14–5.8)
SODIUM BLD-SCNC: 138 MMOL/L (ref 136–145)
WBC NRBC COR # BLD: 7.5 10*3/MM3 (ref 3.4–10.8)

## 2020-01-11 PROCEDURE — 82962 GLUCOSE BLOOD TEST: CPT

## 2020-01-11 PROCEDURE — 25810000003 SODIUM CHLORIDE 0.9 % WITH KCL 20 MEQ 20-0.9 MEQ/L-% SOLUTION: Performed by: INTERNAL MEDICINE

## 2020-01-11 PROCEDURE — 25010000003 CEFTRIAXONE PER 250 MG: Performed by: INTERNAL MEDICINE

## 2020-01-11 PROCEDURE — 85027 COMPLETE CBC AUTOMATED: CPT | Performed by: INTERNAL MEDICINE

## 2020-01-11 PROCEDURE — 80048 BASIC METABOLIC PNL TOTAL CA: CPT | Performed by: INTERNAL MEDICINE

## 2020-01-11 PROCEDURE — 99232 SBSQ HOSP IP/OBS MODERATE 35: CPT | Performed by: INTERNAL MEDICINE

## 2020-01-11 RX ADMIN — BRIMONIDINE TARTRATE 1 DROP: 2 SOLUTION OPHTHALMIC at 09:34

## 2020-01-11 RX ADMIN — SODIUM CHLORIDE, PRESERVATIVE FREE 10 ML: 5 INJECTION INTRAVENOUS at 21:54

## 2020-01-11 RX ADMIN — CEFTRIAXONE SODIUM 2 G: 2 INJECTION, SOLUTION INTRAVENOUS at 13:59

## 2020-01-11 RX ADMIN — PETROLATUM 100 APPLICATION: 420 OINTMENT TOPICAL at 09:34

## 2020-01-11 RX ADMIN — ASPIRIN 81 MG: 81 TABLET, COATED ORAL at 09:27

## 2020-01-11 RX ADMIN — POTASSIUM CHLORIDE AND SODIUM CHLORIDE 75 ML/HR: 900; 150 INJECTION, SOLUTION INTRAVENOUS at 08:07

## 2020-01-11 RX ADMIN — SODIUM CHLORIDE, PRESERVATIVE FREE 10 ML: 5 INJECTION INTRAVENOUS at 09:27

## 2020-01-11 RX ADMIN — BRIMONIDINE TARTRATE 1 DROP: 2 SOLUTION OPHTHALMIC at 21:53

## 2020-01-11 RX ADMIN — LATANOPROST 1 DROP: 50 SOLUTION OPHTHALMIC at 21:54

## 2020-01-11 RX ADMIN — TAMSULOSIN HYDROCHLORIDE 0.4 MG: 0.4 CAPSULE ORAL at 21:53

## 2020-01-11 RX ADMIN — BRIMONIDINE TARTRATE 1 DROP: 2 SOLUTION OPHTHALMIC at 17:27

## 2020-01-11 RX ADMIN — PETROLATUM 100 APPLICATION: 420 OINTMENT TOPICAL at 21:54

## 2020-01-11 RX ADMIN — TAMSULOSIN HYDROCHLORIDE 0.4 MG: 0.4 CAPSULE ORAL at 09:27

## 2020-01-11 NOTE — PROGRESS NOTES
"  [facility] Progress Note    Chief Complaint: Unable to urinate    Talk to the patient's daughter today.  The patient speaks scattered English and I think he can understand what I am saying least he nods that he does but basically we are dealing with persistent retention.  I doubled his Flomax yesterday and after talking with daughter today wearing give this at least another day or 2.  If he is to go to rehab then she understands will probably have to be on intermittent catheterization for a while.  We did talk about other options and treating this retention as well.    Review of Systems:    Review of systems could not be obtained due to  Poor English          Vital Signs  /93 (BP Location: Right arm, Patient Position: Lying)   Pulse 65   Temp 97.9 °F (36.6 °C) (Oral)   Resp 18   Ht 162.6 cm (64\")   Wt 67.4 kg (148 lb 9.6 oz)   SpO2 96%   BMI 25.51 kg/m²     Physical Exam:     General Appearance:    Alert, cooperative, NAD   HEENT:    No trauma, pupils reactive, hearing intact   Back:     No CVA tenderness   Lungs:     Respirations unlabored, no wheezing    Heart:    RRR, intact peripheral pulses   Abdomen:     Soft, NDNT, no masses, no guarding   :     Penis normal testes normal   Extremities:   No edema, no deformity   Lymphatic:   No neck or groin LAD   Skin:   No bleeding, bruising or rashes   Neuro/Psych:   Orientation intact, mood/affect pleasant, no focal findings        Results Review:     I reviewed the patient's new clinical results.  Lab Results (last 24 hours)     Procedure Component Value Units Date/Time    POC Glucose Once [[id number]]  (Normal) Collected:  01/11/20 0622    Specimen:  Blood Updated:  01/11/20 0625     Glucose 126 mg/dL     Basic Metabolic Panel [[id number]]  (Abnormal) Collected:  01/11/20 0433    Specimen:  Blood Updated:  01/11/20 0525     Glucose 142 mg/dL      BUN 18 mg/dL      Creatinine 0.59 mg/dL      Sodium 138 mmol/L      Potassium 4.3 mmol/L      Chloride 104 " mmol/L      CO2 28.1 mmol/L      Calcium 9.1 mg/dL      eGFR  African Amer >150 mL/min/1.73      eGFR Non African Amer 133 mL/min/1.73      BUN/Creatinine Ratio 30.5     Anion Gap 5.9 mmol/L     Narrative:       GFR Normal >60  Chronic Kidney Disease <60  Kidney Failure <15      CBC (No Diff) [[id number]]  (Abnormal) Collected:  01/11/20 0433    Specimen:  Blood Updated:  01/11/20 0524     WBC 7.50 10*3/mm3      RBC 3.35 10*6/mm3      Hemoglobin 9.7 g/dL      Hematocrit 29.7 %      MCV 88.7 fL      MCH 29.0 pg      MCHC 32.7 g/dL      RDW 12.5 %      RDW-SD 40.3 fl      MPV 9.0 fL      Platelets 281 10*3/mm3     Blood Culture - Blood, Arm, Left [[id number]]  (Abnormal)  (Susceptibility) Collected:  01/08/20 1316    Specimen:  Blood from Arm, Left Updated:  01/10/20 2256     Blood Culture Escherichia coli     Isolated from Anaerobic Bottle     Gram Stain Anaerobic Bottle Gram negative bacilli    Susceptibility      Escherichia coli     VENKAT     Ampicillin Resistant     Ampicillin + Sulbactam Intermediate     Cefepime Susceptible     Ceftazidime Susceptible     Ceftriaxone Susceptible     Gentamicin Susceptible     Levofloxacin Intermediate     Piperacillin + Tazobactam Susceptible     Trimethoprim + Sulfamethoxazole Susceptible                Susceptibility Comments     Escherichia coli    Cefazolin sensitivity will not be reported for Enterobacteriaceae in non-urine isolates. If cefazolin is preferred, please call the microbiology lab to request an E-test.             POC Glucose Once [[id number]]  (Abnormal) Collected:  01/10/20 2047    Specimen:  Blood Updated:  01/10/20 2049     Glucose 152 mg/dL     Blood Culture - Blood, Arm, Right [[id number]] Collected:  01/09/20 1641    Specimen:  Blood from Arm, Right Updated:  01/10/20 1700     Blood Culture No growth at 24 hours    POC Glucose Once [[id number]]  (Abnormal) Collected:  01/10/20 1619    Specimen:  Blood Updated:  01/10/20 1621     Glucose 132 mg/dL     Blood  Culture - Blood, Arm, Left [[id number]] Collected:  01/09/20 1308    Specimen:  Blood from Arm, Left Updated:  01/10/20 1345     Blood Culture No growth at 24 hours    Blood Culture - Blood, Arm, Left [[id number]] Collected:  01/08/20 1237    Specimen:  Blood from Arm, Left Updated:  01/10/20 1245     Blood Culture No growth at 2 days    POC Glucose Once [[id number]]  (Abnormal) Collected:  01/10/20 1131    Specimen:  Blood Updated:  01/10/20 1149     Glucose 138 mg/dL         Imaging Results (Last 24 Hours)     Procedure Component Value Units Date/Time    CT Abdomen Pelvis With & Without Contrast [[id number]] Collected:  01/10/20 1813     Updated:  01/10/20 1911    Narrative:       ABDOMEN AND PELVIS CT WITHOUT AND WITH IV CONTRAST     HISTORY: Follow-up renal lesion.     TECHNIQUE: Precontrast and multiphasic post IV contrast CT of the  abdomen and pelvis is provided. 85 mL Isovue-300 contrast was used. This  is correlated with a CT scan from 06/13/2019 and a renal ultrasound  performed yesterday. Radiation dose reduction techniques were utilized,  including automated exposure control and exposure modulation based on  body size.     FINDINGS: There is diffuse third spacing along the body wall of the  lower chest and throughout the abdomen and pelvis. There is diffuse  mesenteric edema. Small bilateral pleural effusions layer posteriorly at  both lung bases. There is some linear, patchy atelectasis or possibly  pneumonia at the lung bases.     At the upper pole of the left kidney, there is an approximately 20 mm  renal lesion. This lesion appears hyperdense on precontrast images,  measuring up to almost 70 Hounsfield units. It does not enhance  following contrast administration and is consistent with a hemorrhagic  cyst. There is also a simple cyst at the upper pole of the right kidney,  about 31 mm diameter.     There is abnormally diminished enhancement of the cortex along the  lateral half of the right renal upper  pole, consistent with  pyelonephritis.  There is normal enhancement elsewhere throughout the renal cortex  bilaterally. There is no hydronephrosis. There is a 2 mm nonobstructing  calyceal calculus at the upper midpole collecting system of the right  kidney and there is a 3 mm right mid ureteral calculus, nonobstructing.  No hydronephrosis is present. At the left kidney, there is a 2 mm  nonobstructing calyceal calculus posteriorly and a 4 or 5 mm calculus  laterally along the upper midpole.     Parenchyma of the liver, spleen, pancreas, and adrenals appears normal.  The gallbladder is in situ and contains gallstones. There is no  gallbladder wall thickening or dilatation. The aorta is partly calcified  but normal in caliber. The bowel appears normal. There is no  lymphadenopathy.       Impression:       Lesion at the upper pole of the left kidney represents a  proteinaceous or hemorrhagic cyst. There is abnormally diminished  enhancement of the cortex along the lateral upper pole of the right  kidney which is new in comparison to CT from 06/13/2019 and is  consistent with pyelonephritis. There is a nonobstructing 3 mm calculus  at the mid right ureter. There is also diffuse third spacing, small  bilateral pleural effusions, and some hazy opacity in the posterior  lower lobes bilaterally representing either atelectasis or pneumonia.     This report was finalized on 1/10/2020 7:08 PM by Dr. Gallo Reese M.D.             Medication Review:   I have personally reviewed    Current Facility-Administered Medications:   •  acetaminophen (TYLENOL) tablet 650 mg, 650 mg, Oral, Q4H PRN, 650 mg at 01/10/20 2116 **OR** acetaminophen (TYLENOL) 160 MG/5ML solution 650 mg, 650 mg, Oral, Q4H PRN **OR** acetaminophen (TYLENOL) suppository 650 mg, 650 mg, Rectal, Q4H PRN, Anna Soriano, COMPA  •  aspirin EC tablet 81 mg, 81 mg, Oral, Daily, Bradley Pearce MD, 81 mg at 01/11/20 0969  •  brimonidine (ALPHAGAN) 0.2 %  ophthalmic solution 1 drop, 1 drop, Both Eyes, TID, Bradley Pearce MD, 1 drop at 01/11/20 0934  •  cefTRIAXone (ROCEPHIN) IVPB 2 g, 2 g, Intravenous, Q24H, Last Rate: 100 mL/hr at 01/10/20 1532, 2 g at 01/10/20 1532 **AND** [DISCONTINUED] azithromycin (ZITHROMAX) 500 mg 0.9% NaCl (Add-vantage) 250 mL, 500 mg, Intravenous, Q24H, Anna Soriano APRN  •  dextrose (D50W) 25 g/ 50mL Intravenous Solution 25 g, 25 g, Intravenous, Q15 Min PRN, Bradley Pearce MD  •  dextrose (GLUTOSE) oral gel 15 g, 15 g, Oral, Q15 Min PRN, Bradley Pearce MD  •  glucagon (human recombinant) (GLUCAGEN DIAGNOSTIC) injection 1 mg, 1 mg, Subcutaneous, Q15 Min PRN, Bradley Pearce MD  •  insulin lispro (humaLOG) injection 0-9 Units, 0-9 Units, Subcutaneous, 4x Daily With Meals & Nightly, Bradley Pearce MD  •  ipratropium-albuterol (DUO-NEB) nebulizer solution 3 mL, 3 mL, Nebulization, Q4H PRN, Anna Soriano APRN  •  latanoprost (XALATAN) 0.005 % ophthalmic solution 1 drop, 1 drop, Both Eyes, Nightly, Bradley Pearce MD, 1 drop at 01/10/20 2115  •  nitroglycerin (NITROSTAT) SL tablet 0.4 mg, 0.4 mg, Sublingual, Q5 Min PRN, Anna Soriano APRN  •  ondansetron (ZOFRAN) injection 4 mg, 4 mg, Intravenous, Q6H PRN, Anna Soriano APRN  •  Petrolatum ointment, , Topical, Q12H, Gallo San MD, 100 application at 01/11/20 0934  •  potassium chloride (MICRO-K) CR capsule 40 mEq, 40 mEq, Oral, PRN, 40 mEq at 01/09/20 1634 **OR** potassium chloride (KLOR-CON) packet 40 mEq, 40 mEq, Oral, PRN **OR** potassium chloride 10 mEq in 100 mL IVPB, 10 mEq, Intravenous, Q1H PRN, Sonia Sousa, APRN  •  [COMPLETED] Insert peripheral IV, , , Once **AND** sodium chloride 0.9 % flush 10 mL, 10 mL, Intravenous, PRN, Keon Bourgeois MD  •  sodium chloride 0.9 % flush 10 mL, 10 mL, Intravenous, Q12H, Anna Soriano, APRN, 10 mL at 01/11/20 0927  •  sodium chloride 0.9 % flush 10 mL, 10 mL, Intravenous, PRN,  Anna Soriano, APRN  •  sodium chloride 0.9 % with KCl 20 mEq/L infusion, 75 mL/hr, Intravenous, Continuous, Bradley Pearce MD, Last Rate: 75 mL/hr at 01/11/20 0807, 75 mL/hr at 01/11/20 0807  •  tamsulosin (FLOMAX) 24 hr capsule 0.4 mg, 0.4 mg, Oral, BID, Steven Steel MD, 0.4 mg at 01/11/20 0927    Allergies:    Patient has no known allergies.    Assessment:    Active Problems:  Patient Active Problem List   Diagnosis   • Acute pyelonephritis   • HTN (hypertension)   • Type 2 diabetes mellitus, without long-term current use of insulin (CMS/HCC)   • Hypokalemia   • Elevated alkaline phosphatase level   • Aortic stenosis   • Acute UTI   • Wandering atrial pacemaker   • Elevated troponin   • Sepsis without acute organ dysfunction (CMS/HCC)   • History of noncompliance with medical treatment   • E. coli UTI (urinary tract infection)   • Bacteremia due to Escherichia coli   • Acute urinary retention   • Left lower lobe pulmonary infiltrate   • Non-traumatic rhabdomyolysis   • Lesion of left native kidney   • Metabolic encephalopathy       Urinary retention  Complex cyst left kidney    Plan:    Continue Flomax twice a day for now.  Consider TURP or even a uro-lift procedure in the office setting if he fails.  The latter would be the most conservative might be pretty effective for him.  This would have to be done as an outpatient and if we considered a TURP he may be too early from his recent infection to consider that.      Steven Steel MD    1/11/2020  10:27 AM

## 2020-01-11 NOTE — PROGRESS NOTES
LOS: 3 days     Chief Complaint:  Follow-up E coli septicemia due to  source    Interval History: Afebrile, requiring in and out cath for urinary retention.  Urology following.  States he feels okay and denies any abdominal pain nausea vomiting or diarrhea.  No shortness of breath or cough.  Tolerating antibiotics without a rash.    Vital Signs  Temp:  [97.6 °F (36.4 °C)-97.9 °F (36.6 °C)] 97.9 °F (36.6 °C)  Heart Rate:  [65-79] 65  Resp:  [16-18] 18  BP: (122-148)/(62-93) 148/93    Physical Exam:  General: in NAD  Cardiovascular: RRR, 3/6 JODY, no LE edema  Respiratory:  Breathing comfortably on room air  GI: Abdomen is soft, non-tender, non-distended  Skin: No rashes    Antibiotics:  •  cefTRIAXone (ROCEPHIN) IVPB 2 g, 2 g, Intravenous, Q24H    LABS:  CBC, BMP, A1c, micro reviewed today  Lab Results   Component Value Date    WBC 7.50 01/11/2020    HGB 9.7 (L) 01/11/2020    HCT 29.7 (L) 01/11/2020    MCV 88.7 01/11/2020     01/11/2020     Lab Results   Component Value Date    GLUCOSE 142 (H) 01/11/2020    BUN 18 01/11/2020    CREATININE 0.59 (L) 01/11/2020    EGFRIFNONA 133 01/11/2020    EGFRIFAFRI >150 01/11/2020    BCR 30.5 (H) 01/11/2020    CO2 28.1 01/11/2020    CALCIUM 9.1 01/11/2020    ALBUMIN 2.50 (L) 01/09/2020    LABIL2 0.8 (L) 11/06/2019    AST 14 01/09/2020    ALT 5 01/09/2020     Lab Results   Component Value Date    HGBA1C 4.80 01/10/2020     Microbiology:  1/8 RVP: negative  1/8 BCx: E coli   1/8 UCx: 100k E coli (sensitive to ceftriaxone, cefazolin, Zosyn, bactrim; intermediate to levofloxacin; resistant to ampicillin)  1/9 BCx: NGTD x 2    Radiology (personally reviewed report):   1/10 of the abdomen pelvis shows left upper pole kidney lesion consistent with proteinaceous or hemorrhagic cyst.  Right kidney enhancement consistent with pyelonephritis.  Obstructing 3 mm calculus on the right.  Diffuse third spacing    Assessment/Plan   1. E coli septicemia due to  source  2.  Controlled  DM2  3 Aortic stenosis  4. Left renal mass     CT with right kidney proteinaceous or hemorrhagic cyst.  Urology following.    Patient is afebrile.  White blood cell count back to normal.  Repeat blood cultures are negative to date.  Continue ceftriaxone 2 g IV every 24 hours x7 days.  Antibiotic stop date January 14.    We will see again on Monday.  Please do not hesitate to call us with further questions or concerns

## 2020-01-11 NOTE — PROGRESS NOTES
"DAILY PROGRESS NOTE  [facility]    Patient Identification:  Name: Raymon Solis  Age: 79 y.o.  Sex: male  :  1940  MRN: [id number]         Primary Care Physician: Magali Burgess MD    Subjective:  Interval History:No complaints.    Objective:    Scheduled Meds:  aspirin 81 mg Oral Daily   brimonidine 1 drop Both Eyes TID   cefTRIAXone 2 g Intravenous Q24H   insulin lispro 0-9 Units Subcutaneous 4x Daily With Meals & Nightly   latanoprost 1 drop Both Eyes Nightly   Petrolatum  Topical Q12H   sodium chloride 10 mL Intravenous Q12H   tamsulosin 0.4 mg Oral BID     Continuous Infusions:  sodium chloride 0.9 % with KCl 20 mEq 75 mL/hr Last Rate: 75 mL/hr (20 0807)       Vital signs in last 24 hours:  Temp:  [97.8 °F (36.6 °C)-98.3 °F (36.8 °C)] 98.3 °F (36.8 °C)  Heart Rate:  [65-77] 65  Resp:  [16-18] 18  BP: (136-153)/(69-93) 153/85    Intake/Output:    Intake/Output Summary (Last 24 hours) at 2020 1639  Last data filed at 2020 1300  Gross per 24 hour   Intake 480 ml   Output 500 ml   Net -20 ml       Exam:  /85 (BP Location: Right arm, Patient Position: Lying)   Pulse 65   Temp 98.3 °F (36.8 °C) (Oral)   Resp 18   Ht 162.6 cm (64\")   Wt 67.4 kg (148 lb 9.6 oz)   SpO2 97%   BMI 25.51 kg/m²     General Appearance:    Alert, cooperative, no distress   Head:    Normocephalic, without obvious abnormality, atraumatic   Eyes:       Throat:   Lips, tongue, gums normal   Neck:   Supple, symmetrical, trachea midline, no JVD   Lungs:     Clear to auscultation bilaterally, respirations unlabored   Chest Wall:    No tenderness or deformity    Heart:    Regular rate and rhythm, S1 and S2 normal, no murmur,no  Rub or gallop   Abdomen:     Soft, non-tender, bowel sounds active, no masses, no organomegaly    Extremities:   Extremities normal, atraumatic, no cyanosis or edema   Pulses:      Skin:   Skin is warm and dry,  no rashes or palpable lesions   Neurologic:   no focal " deficits noted      Lab Results (last 72 hours)     Procedure Component Value Units Date/Time    Blood Culture - Blood, Arm, Left [[id number]] Collected:  01/09/20 1308    Specimen:  Blood from Arm, Left Updated:  01/11/20 1345     Blood Culture No growth at 2 days    Blood Culture - Blood, Arm, Left [[id number]] Collected:  01/08/20 1237    Specimen:  Blood from Arm, Left Updated:  01/11/20 1245     Blood Culture No growth at 3 days    POC Glucose Once [[id number]]  (Abnormal) Collected:  01/11/20 1206    Specimen:  Blood Updated:  01/11/20 1219     Glucose 136 mg/dL     POC Glucose Once [[id number]]  (Normal) Collected:  01/11/20 0622    Specimen:  Blood Updated:  01/11/20 0625     Glucose 126 mg/dL     Basic Metabolic Panel [[id number]]  (Abnormal) Collected:  01/11/20 0433    Specimen:  Blood Updated:  01/11/20 0525     Glucose 142 mg/dL      BUN 18 mg/dL      Creatinine 0.59 mg/dL      Sodium 138 mmol/L      Potassium 4.3 mmol/L      Chloride 104 mmol/L      CO2 28.1 mmol/L      Calcium 9.1 mg/dL      eGFR  African Amer >150 mL/min/1.73      eGFR Non African Amer 133 mL/min/1.73      BUN/Creatinine Ratio 30.5     Anion Gap 5.9 mmol/L     Narrative:       GFR Normal >60  Chronic Kidney Disease <60  Kidney Failure <15      CBC (No Diff) [[id number]]  (Abnormal) Collected:  01/11/20 0433    Specimen:  Blood Updated:  01/11/20 0524     WBC 7.50 10*3/mm3      RBC 3.35 10*6/mm3      Hemoglobin 9.7 g/dL      Hematocrit 29.7 %      MCV 88.7 fL      MCH 29.0 pg      MCHC 32.7 g/dL      RDW 12.5 %      RDW-SD 40.3 fl      MPV 9.0 fL      Platelets 281 10*3/mm3     Blood Culture - Blood, Arm, Left [[id number]]  (Abnormal)  (Susceptibility) Collected:  01/08/20 1316    Specimen:  Blood from Arm, Left Updated:  01/10/20 2256     Blood Culture Escherichia coli     Isolated from Anaerobic Bottle     Gram Stain Anaerobic Bottle Gram negative bacilli    Susceptibility      Escherichia coli     VENKAT     Ampicillin Resistant      Ampicillin + Sulbactam Intermediate     Cefepime Susceptible     Ceftazidime Susceptible     Ceftriaxone Susceptible     Gentamicin Susceptible     Levofloxacin Intermediate     Piperacillin + Tazobactam Susceptible     Trimethoprim + Sulfamethoxazole Susceptible                Susceptibility Comments     Escherichia coli    Cefazolin sensitivity will not be reported for Enterobacteriaceae in non-urine isolates. If cefazolin is preferred, please call the microbiology lab to request an E-test.             POC Glucose Once [[id number]]  (Abnormal) Collected:  01/10/20 2047    Specimen:  Blood Updated:  01/10/20 2049     Glucose 152 mg/dL     Blood Culture - Blood, Arm, Right [[id number]] Collected:  01/09/20 1641    Specimen:  Blood from Arm, Right Updated:  01/10/20 1700     Blood Culture No growth at 24 hours    POC Glucose Once [[id number]]  (Abnormal) Collected:  01/10/20 1619    Specimen:  Blood Updated:  01/10/20 1621     Glucose 132 mg/dL     POC Glucose Once [[id number]]  (Abnormal) Collected:  01/10/20 1131    Specimen:  Blood Updated:  01/10/20 1149     Glucose 138 mg/dL     CK [[id number]]  (Normal) Collected:  01/10/20 0642    Specimen:  Blood from Hand, Right Updated:  01/10/20 0806     Creatine Kinase 38 U/L     Basic Metabolic Panel [[id number]]  (Abnormal) Collected:  01/10/20 0642    Specimen:  Blood from Hand, Right Updated:  01/10/20 0731     Glucose 105 mg/dL      BUN 20 mg/dL      Creatinine 0.64 mg/dL      Sodium 137 mmol/L      Potassium 4.2 mmol/L      Chloride 101 mmol/L      CO2 28.9 mmol/L      Calcium 9.3 mg/dL      eGFR  African Amer 146 mL/min/1.73      eGFR Non African Amer 121 mL/min/1.73      BUN/Creatinine Ratio 31.3     Anion Gap 7.1 mmol/L     Narrative:       GFR Normal >60  Chronic Kidney Disease <60  Kidney Failure <15      Hemoglobin A1c [[id number]]  (Normal) Collected:  01/10/20 0642    Specimen:  Blood from Hand, Right Updated:  01/10/20 0703     Hemoglobin A1C 4.80 %     Narrative:        Hemoglobin A1C Ranges:    Increased Risk for Diabetes  5.7% to 6.4%  Diabetes                     >= 6.5%  Diabetic Goal                < 7.0%    CBC (No Diff) [[id number]]  (Abnormal) Collected:  01/10/20 0642    Specimen:  Blood from Hand, Right Updated:  01/10/20 0701     WBC 10.35 10*3/mm3      RBC 3.65 10*6/mm3      Hemoglobin 10.8 g/dL      Hematocrit 31.0 %      MCV 84.9 fL      MCH 29.6 pg      MCHC 34.8 g/dL      RDW 12.2 %      RDW-SD 37.4 fl      MPV 8.8 fL      Platelets 290 10*3/mm3     POC Glucose Once [[id number]]  (Normal) Collected:  01/10/20 0638    Specimen:  Blood Updated:  01/10/20 0639     Glucose 91 mg/dL     Urine Culture - Urine, Urine, Catheter [[id number]]  (Abnormal)  (Susceptibility) Collected:  01/08/20 1349    Specimen:  Urine, Catheter Updated:  01/10/20 0231     Urine Culture >100,000 CFU/mL Escherichia coli    Narrative:             Susceptibility      Escherichia coli     VENKAT     Ampicillin Resistant     Ampicillin + Sulbactam Intermediate     Cefazolin Susceptible     Cefepime Susceptible     Ceftazidime Susceptible     Ceftriaxone Susceptible     Gentamicin Susceptible     Levofloxacin Intermediate     Nitrofurantoin Susceptible     Piperacillin + Tazobactam Susceptible     Tetracycline Susceptible     Trimethoprim + Sulfamethoxazole Susceptible                    Potassium [[id number]]  (Normal) Collected:  01/09/20 2113    Specimen:  Blood Updated:  01/09/20 2140     Potassium 4.1 mmol/L     POC Glucose Once [[id number]]  (Normal) Collected:  01/09/20 2120    Specimen:  Blood Updated:  01/09/20 2123     Glucose 112 mg/dL     POC Glucose Once [[id number]]  (Normal) Collected:  01/09/20 1634    Specimen:  Blood Updated:  01/09/20 1638     Glucose 130 mg/dL     Troponin [[id number]]  (Abnormal) Collected:  01/09/20 1308    Specimen:  Blood Updated:  01/09/20 1443     Troponin T 0.076 ng/mL     Narrative:       Troponin T Reference Range:  <= 0.03 ng/mL-   Negative for AMI  >0.03  ng/mL-     Abnormal for myocardial necrosis.  Clinicians would have to utilize clinical acumen, EKG, Troponin and serial changes to determine if it is an Acute Myocardial Infarction or myocardial injury due to an underlying chronic condition.       Results may be falsely decreased if patient taking Biotin.      CK [[id number]]  (Normal) Collected:  01/09/20 1308    Specimen:  Blood Updated:  01/09/20 1420     Creatine Kinase 79 U/L     Hepatic Function Panel [[id number]]  (Abnormal) Collected:  01/09/20 1308    Specimen:  Blood Updated:  01/09/20 1401     Total Protein 5.7 g/dL      Albumin 2.50 g/dL      ALT (SGPT) 5 U/L      AST (SGOT) 14 U/L      Alkaline Phosphatase 143 U/L      Total Bilirubin 0.5 mg/dL      Bilirubin, Direct 0.3 mg/dL      Bilirubin, Indirect 0.2 mg/dL     POC Glucose Once [[id number]]  (Abnormal) Collected:  01/09/20 1135    Specimen:  Blood Updated:  01/09/20 1147     Glucose 135 mg/dL     Potassium [[id number]]  (Abnormal) Collected:  01/09/20 0845    Specimen:  Blood Updated:  01/09/20 0906     Potassium 3.4 mmol/L     Blood Culture ID, PCR - Blood, Arm, Left [[id number]]  (Abnormal) Collected:  01/08/20 1316    Specimen:  Blood from Arm, Left Updated:  01/09/20 0713     BCID, PCR Escherichia coli. Identification by BCID PCR.    Troponin [[id number]]  (Abnormal) Collected:  01/09/20 0553    Specimen:  Blood Updated:  01/09/20 0710     Troponin T 0.080 ng/mL     Narrative:       Troponin T Reference Range:  <= 0.03 ng/mL-   Negative for AMI  >0.03 ng/mL-     Abnormal for myocardial necrosis.  Clinicians would have to utilize clinical acumen, EKG, Troponin and serial changes to determine if it is an Acute Myocardial Infarction or myocardial injury due to an underlying chronic condition.       Results may be falsely decreased if patient taking Biotin.      CBC (No Diff) [[id number]]  (Abnormal) Collected:  01/09/20 0553    Specimen:  Blood Updated:  01/09/20 0709     WBC 18.46 10*3/mm3      RBC 4.12  10*6/mm3      Hemoglobin 12.3 g/dL      Hematocrit 35.8 %      MCV 86.9 fL      MCH 29.9 pg      MCHC 34.4 g/dL      RDW 12.3 %      RDW-SD 39.0 fl      MPV 8.9 fL      Platelets 389 10*3/mm3     Basic Metabolic Panel [[id number]]  (Abnormal) Collected:  01/09/20 0553    Specimen:  Blood Updated:  01/09/20 0656     Glucose 128 mg/dL      BUN 19 mg/dL      Creatinine 0.83 mg/dL      Sodium 141 mmol/L      Potassium 3.4 mmol/L      Chloride 99 mmol/L      CO2 29.6 mmol/L      Calcium 9.6 mg/dL      eGFR  African Amer 108 mL/min/1.73      eGFR Non African Amer 89 mL/min/1.73      BUN/Creatinine Ratio 22.9     Anion Gap 12.4 mmol/L     Narrative:       GFR Normal >60  Chronic Kidney Disease <60  Kidney Failure <15      POC Glucose Once [[id number]]  (Normal) Collected:  01/09/20 0647    Specimen:  Blood Updated:  01/09/20 0649     Glucose 116 mg/dL     Potassium [[id number]]  (Abnormal) Collected:  01/08/20 2339    Specimen:  Blood Updated:  01/09/20 0006     Potassium 3.2 mmol/L     Myoglobin Screen, Urine - Urine, Clean Catch [[id number]]  (Abnormal) Collected:  01/08/20 1350    Specimen:  Urine, Clean Catch Updated:  01/08/20 2345     Myoglobin, Qualitative Positive    Narrative:       Due to the similarities in the chemical properties of Hemoglobin and and Myoglobin, the presence of either will yield a positive Myoglobin Screen.    POC Glucose Once [[id number]]  (Normal) Collected:  01/08/20 2043    Specimen:  Blood Updated:  01/08/20 2101     Glucose 127 mg/dL     Respiratory Panel, PCR - Swab, Nasopharynx [[id number]]  (Normal) Collected:  01/08/20 1655    Specimen:  Swab from Nasopharynx Updated:  01/08/20 1916     ADENOVIRUS, PCR Not Detected     Coronavirus 229E Not Detected     Coronavirus HKU1 Not Detected     Coronavirus NL63 Not Detected     Coronavirus OC43 Not Detected     Human Metapneumovirus Not Detected     Human Rhinovirus/Enterovirus Not Detected     Influenza B PCR Not Detected     Parainfluenza Virus 1  Not Detected     Parainfluenza Virus 2 Not Detected     Parainfluenza Virus 3 Not Detected     Parainfluenza Virus 4 Not Detected     Bordetella pertussis pcr Not Detected     Influenza A H1 2009 PCR Not Detected     Chlamydophila pneumoniae PCR Not Detected     Mycoplasma pneumo by PCR Not Detected     Influenza A PCR Not Detected     Influenza A H3 Not Detected     Influenza A H1 Not Detected     RSV, PCR Not Detected     Bordetella parapertussis PCR Not Detected    Gamma GT [[id number]]  (Normal) Collected:  01/08/20 1223    Specimen:  Blood Updated:  01/08/20 1842     GGT 61 U/L     POC Glucose Once [[id number]]  (Abnormal) Collected:  01/08/20 1821    Specimen:  Blood Updated:  01/08/20 1841     Glucose 134 mg/dL     Lactic Acid, Reflex [[id number]]  (Abnormal) Collected:  01/08/20 1655    Specimen:  Blood Updated:  01/08/20 1757     Lactate 2.1 mmol/L         Data Review:  Results from last 7 days   Lab Units 01/11/20  0433 01/10/20  0642 01/09/20 2113  01/09/20  0553   SODIUM mmol/L 138 137  --   --  141   POTASSIUM mmol/L 4.3 4.2 4.1   < > 3.4*   CHLORIDE mmol/L 104 101  --   --  99   CO2 mmol/L 28.1 28.9  --   --  29.6*   BUN mg/dL 18 20  --   --  19   CREATININE mg/dL 0.59* 0.64*  --   --  0.83   GLUCOSE mg/dL 142* 105*  --   --  128*   CALCIUM mg/dL 9.1 9.3  --   --  9.6    < > = values in this interval not displayed.     Results from last 7 days   Lab Units 01/11/20  0433 01/10/20  0642 01/09/20  0553   WBC 10*3/mm3 7.50 10.35 18.46*   HEMOGLOBIN g/dL 9.7* 10.8* 12.3*   HEMATOCRIT % 29.7* 31.0* 35.8*   PLATELETS 10*3/mm3 281 290 389         Results from last 7 days   Lab Units 01/10/20  0642   HEMOGLOBIN A1C % 4.80     Lab Results   Lab Value Date/Time    TROPONINT 0.076 (C) 01/09/2020 1308    TROPONINT 0.080 (C) 01/09/2020 0553    TROPONINT 0.040 (C) 01/08/2020 1223    TROPONINT 0.033 (C) 06/13/2019 0613    TROPONINT 0.044 (C) 06/12/2019 2021         Results from last 7 days   Lab Units 01/09/20  4252  01/08/20  1223   ALK PHOS U/L 143* 154*   BILIRUBIN mg/dL 0.5 0.8   BILIRUBIN DIRECT mg/dL 0.3  --    ALT (SGPT) U/L 5 8   AST (SGOT) U/L 14 16         Results from last 7 days   Lab Units 01/10/20  0642   HEMOGLOBIN A1C % 4.80     Glucose   Date/Time Value Ref Range Status   01/11/2020 1206 136 (H) 70 - 130 mg/dL Final   01/11/2020 0622 126 70 - 130 mg/dL Final   01/10/2020 2047 152 (H) 70 - 130 mg/dL Final   01/10/2020 1619 132 (H) 70 - 130 mg/dL Final   01/10/2020 1131 138 (H) 70 - 130 mg/dL Final   01/10/2020 0638 91 70 - 130 mg/dL Final   01/09/2020 2120 112 70 - 130 mg/dL Final   01/09/2020 1634 130 70 - 130 mg/dL Final     Results from last 7 days   Lab Units 01/08/20  1223   INR  1.16*       Past Medical History:   Diagnosis Date   • Aortic stenosis    • Diabetes mellitus (CMS/HCC)    • Hypertension        Assessment:  Active Hospital Problems    Diagnosis  POA   • **Bacteremia due to Escherichia coli [R78.81]  Yes   • E. coli UTI (urinary tract infection) [N39.0, B96.20]  Yes   • Acute urinary retention [R33.8]  Yes   • Left lower lobe pulmonary infiltrate [R91.8]  Yes   • Non-traumatic rhabdomyolysis [M62.82]  Yes   • Lesion of left native kidney [N28.9]  Yes   • Metabolic encephalopathy [G93.41]  Yes   • Sepsis without acute organ dysfunction (CMS/HCC) [A41.9]  Yes   • History of noncompliance with medical treatment [Z91.19]  Not Applicable   • Elevated troponin [R79.89]  Yes   • Aortic stenosis [I35.0]  Yes   • Elevated alkaline phosphatase level [R74.8]  Yes   • Type 2 diabetes mellitus, without long-term current use of insulin (CMS/HCC) [E11.9]  Yes   • HTN (hypertension) [I10]  Yes   • Hypokalemia [E87.6]  Yes      Resolved Hospital Problems   No resolved problems to display.       Plan:  Continue with antibiotics as per ID. Follow lab. Plans as per ID.    Fran Alan MD  1/11/2020  4:39 PM

## 2020-01-11 NOTE — PLAN OF CARE
Problem: Patient Care Overview  Goal: Plan of Care Review  Outcome: Ongoing (interventions implemented as appropriate)  Flowsheets (Taken 1/11/2020 1701)  Plan of Care Reviewed With: patient  Note:   Pt has remained on ivf today, as well as received his iv antibiotics q24. He has needed I&O cath performed, getting 250mL out, regardless of his brief being heavily soaked. No order for TURP at this point, as urology states that it is too early to determine need or to order due to recent infection. Plan for pt to dc to facility, as pt lives alone at this point. Will continue to monitor.

## 2020-01-12 LAB
ANION GAP SERPL CALCULATED.3IONS-SCNC: 9.2 MMOL/L (ref 5–15)
BASOPHILS # BLD AUTO: 0.04 10*3/MM3 (ref 0–0.2)
BASOPHILS NFR BLD AUTO: 0.5 % (ref 0–1.5)
BUN BLD-MCNC: 13 MG/DL (ref 8–23)
BUN/CREAT SERPL: 18.8 (ref 7–25)
CALCIUM SPEC-SCNC: 9.3 MG/DL (ref 8.6–10.5)
CHLORIDE SERPL-SCNC: 101 MMOL/L (ref 98–107)
CO2 SERPL-SCNC: 26.8 MMOL/L (ref 22–29)
CREAT BLD-MCNC: 0.69 MG/DL (ref 0.76–1.27)
DEPRECATED RDW RBC AUTO: 39.1 FL (ref 37–54)
EOSINOPHIL # BLD AUTO: 0.28 10*3/MM3 (ref 0–0.4)
EOSINOPHIL NFR BLD AUTO: 3.4 % (ref 0.3–6.2)
ERYTHROCYTE [DISTWIDTH] IN BLOOD BY AUTOMATED COUNT: 12.1 % (ref 12.3–15.4)
GFR SERPL CREATININE-BSD FRML MDRD: 111 ML/MIN/1.73
GFR SERPL CREATININE-BSD FRML MDRD: 134 ML/MIN/1.73
GLUCOSE BLD-MCNC: 100 MG/DL (ref 65–99)
GLUCOSE BLDC GLUCOMTR-MCNC: 102 MG/DL (ref 70–130)
GLUCOSE BLDC GLUCOMTR-MCNC: 119 MG/DL (ref 70–130)
GLUCOSE BLDC GLUCOMTR-MCNC: 130 MG/DL (ref 70–130)
GLUCOSE BLDC GLUCOMTR-MCNC: 96 MG/DL (ref 70–130)
HCT VFR BLD AUTO: 31.4 % (ref 37.5–51)
HGB BLD-MCNC: 10.4 G/DL (ref 13–17.7)
IMM GRANULOCYTES # BLD AUTO: 0.05 10*3/MM3 (ref 0–0.05)
IMM GRANULOCYTES NFR BLD AUTO: 0.6 % (ref 0–0.5)
LYMPHOCYTES # BLD AUTO: 1.08 10*3/MM3 (ref 0.7–3.1)
LYMPHOCYTES NFR BLD AUTO: 12.9 % (ref 19.6–45.3)
MCH RBC QN AUTO: 29 PG (ref 26.6–33)
MCHC RBC AUTO-ENTMCNC: 33.1 G/DL (ref 31.5–35.7)
MCV RBC AUTO: 87.5 FL (ref 79–97)
MONOCYTES # BLD AUTO: 0.66 10*3/MM3 (ref 0.1–0.9)
MONOCYTES NFR BLD AUTO: 7.9 % (ref 5–12)
NEUTROPHILS # BLD AUTO: 6.23 10*3/MM3 (ref 1.7–7)
NEUTROPHILS NFR BLD AUTO: 74.7 % (ref 42.7–76)
NRBC BLD AUTO-RTO: 0 /100 WBC (ref 0–0.2)
PLATELET # BLD AUTO: 264 10*3/MM3 (ref 140–450)
PMV BLD AUTO: 8.5 FL (ref 6–12)
POTASSIUM BLD-SCNC: 4.4 MMOL/L (ref 3.5–5.2)
RBC # BLD AUTO: 3.59 10*6/MM3 (ref 4.14–5.8)
SODIUM BLD-SCNC: 137 MMOL/L (ref 136–145)
WBC NRBC COR # BLD: 8.34 10*3/MM3 (ref 3.4–10.8)

## 2020-01-12 PROCEDURE — 25010000003 CEFTRIAXONE PER 250 MG: Performed by: INTERNAL MEDICINE

## 2020-01-12 PROCEDURE — 85025 COMPLETE CBC W/AUTO DIFF WBC: CPT | Performed by: HOSPITALIST

## 2020-01-12 PROCEDURE — 80048 BASIC METABOLIC PNL TOTAL CA: CPT | Performed by: HOSPITALIST

## 2020-01-12 PROCEDURE — 82962 GLUCOSE BLOOD TEST: CPT

## 2020-01-12 PROCEDURE — 25810000003 SODIUM CHLORIDE 0.9 % WITH KCL 20 MEQ 20-0.9 MEQ/L-% SOLUTION: Performed by: INTERNAL MEDICINE

## 2020-01-12 PROCEDURE — 97110 THERAPEUTIC EXERCISES: CPT

## 2020-01-12 RX ADMIN — BRIMONIDINE TARTRATE 1 DROP: 2 SOLUTION OPHTHALMIC at 21:59

## 2020-01-12 RX ADMIN — PETROLATUM 100 APPLICATION: 420 OINTMENT TOPICAL at 09:36

## 2020-01-12 RX ADMIN — BRIMONIDINE TARTRATE 1 DROP: 2 SOLUTION OPHTHALMIC at 16:38

## 2020-01-12 RX ADMIN — LATANOPROST 1 DROP: 50 SOLUTION OPHTHALMIC at 21:59

## 2020-01-12 RX ADMIN — SODIUM CHLORIDE, PRESERVATIVE FREE 10 ML: 5 INJECTION INTRAVENOUS at 09:36

## 2020-01-12 RX ADMIN — BRIMONIDINE TARTRATE 1 DROP: 2 SOLUTION OPHTHALMIC at 09:36

## 2020-01-12 RX ADMIN — CEFTRIAXONE SODIUM 2 G: 2 INJECTION, SOLUTION INTRAVENOUS at 12:28

## 2020-01-12 RX ADMIN — TAMSULOSIN HYDROCHLORIDE 0.4 MG: 0.4 CAPSULE ORAL at 09:36

## 2020-01-12 RX ADMIN — TAMSULOSIN HYDROCHLORIDE 0.4 MG: 0.4 CAPSULE ORAL at 21:59

## 2020-01-12 RX ADMIN — PETROLATUM 100 APPLICATION: 420 OINTMENT TOPICAL at 21:59

## 2020-01-12 RX ADMIN — SODIUM CHLORIDE, PRESERVATIVE FREE 10 ML: 5 INJECTION INTRAVENOUS at 21:59

## 2020-01-12 RX ADMIN — POTASSIUM CHLORIDE AND SODIUM CHLORIDE 75 ML/HR: 900; 150 INJECTION, SOLUTION INTRAVENOUS at 11:50

## 2020-01-12 RX ADMIN — ASPIRIN 81 MG: 81 TABLET, COATED ORAL at 09:36

## 2020-01-12 NOTE — PROGRESS NOTES
"DAILY PROGRESS NOTE  [facility]    Patient Identification:  Name: Raymon Solis  Age: 79 y.o.  Sex: male  :  1940  MRN: [id number]         Primary Care Physician: Magali Burgess MD    Subjective:  Interval History:No complaints.    Objective:    Scheduled Meds:    aspirin 81 mg Oral Daily   brimonidine 1 drop Both Eyes TID   cefTRIAXone 2 g Intravenous Q24H   insulin lispro 0-9 Units Subcutaneous 4x Daily With Meals & Nightly   latanoprost 1 drop Both Eyes Nightly   Petrolatum  Topical Q12H   sodium chloride 10 mL Intravenous Q12H   tamsulosin 0.4 mg Oral BID     Continuous Infusions:    sodium chloride 0.9 % with KCl 20 mEq 75 mL/hr Last Rate: 75 mL/hr (20 1150)       Vital signs in last 24 hours:  Temp:  [97.9 °F (36.6 °C)-98 °F (36.7 °C)] 97.9 °F (36.6 °C)  Heart Rate:  [63-75] 63  Resp:  [18-20] 20  BP: (143-171)/(79-99) 171/99    Intake/Output:    Intake/Output Summary (Last 24 hours) at 2020 1509  Last data filed at 2020 1503  Gross per 24 hour   Intake 900 ml   Output 700 ml   Net 200 ml       Exam:  /99 (BP Location: Right arm, Patient Position: Lying)   Pulse 63   Temp 97.9 °F (36.6 °C) (Oral)   Resp 20   Ht 162.6 cm (64\")   Wt 68.3 kg (150 lb 8 oz)   SpO2 97%   BMI 25.83 kg/m²     General Appearance:    Alert, cooperative, no distress   Head:    Normocephalic, without obvious abnormality, atraumatic   Eyes:       Throat:   Lips, tongue, gums normal   Neck:   Supple, symmetrical, trachea midline, no JVD   Lungs:     Clear to auscultation bilaterally, respirations unlabored   Chest Wall:    No tenderness or deformity    Heart:    Regular rate and rhythm, S1 and S2 normal, no murmur,no  Rub or gallop   Abdomen:     Soft, non-tender, bowel sounds active, no masses, no organomegaly    Extremities:   Extremities normal, atraumatic, no cyanosis or edema   Pulses:      Skin:   Skin is warm and dry,  no rashes or palpable lesions   Neurologic:   no focal " deficits noted      Lab Results (last 72 hours)     Procedure Component Value Units Date/Time    Blood Culture - Blood, Arm, Left [[id number]] Collected:  01/09/20 1308    Specimen:  Blood from Arm, Left Updated:  01/11/20 1345     Blood Culture No growth at 2 days    Blood Culture - Blood, Arm, Left [[id number]] Collected:  01/08/20 1237    Specimen:  Blood from Arm, Left Updated:  01/11/20 1245     Blood Culture No growth at 3 days    POC Glucose Once [[id number]]  (Abnormal) Collected:  01/11/20 1206    Specimen:  Blood Updated:  01/11/20 1219     Glucose 136 mg/dL     POC Glucose Once [[id number]]  (Normal) Collected:  01/11/20 0622    Specimen:  Blood Updated:  01/11/20 0625     Glucose 126 mg/dL     Basic Metabolic Panel [[id number]]  (Abnormal) Collected:  01/11/20 0433    Specimen:  Blood Updated:  01/11/20 0525     Glucose 142 mg/dL      BUN 18 mg/dL      Creatinine 0.59 mg/dL      Sodium 138 mmol/L      Potassium 4.3 mmol/L      Chloride 104 mmol/L      CO2 28.1 mmol/L      Calcium 9.1 mg/dL      eGFR  African Amer >150 mL/min/1.73      eGFR Non African Amer 133 mL/min/1.73      BUN/Creatinine Ratio 30.5     Anion Gap 5.9 mmol/L     Narrative:       GFR Normal >60  Chronic Kidney Disease <60  Kidney Failure <15      CBC (No Diff) [[id number]]  (Abnormal) Collected:  01/11/20 0433    Specimen:  Blood Updated:  01/11/20 0524     WBC 7.50 10*3/mm3      RBC 3.35 10*6/mm3      Hemoglobin 9.7 g/dL      Hematocrit 29.7 %      MCV 88.7 fL      MCH 29.0 pg      MCHC 32.7 g/dL      RDW 12.5 %      RDW-SD 40.3 fl      MPV 9.0 fL      Platelets 281 10*3/mm3     Blood Culture - Blood, Arm, Left [[id number]]  (Abnormal)  (Susceptibility) Collected:  01/08/20 1316    Specimen:  Blood from Arm, Left Updated:  01/10/20 2256     Blood Culture Escherichia coli     Isolated from Anaerobic Bottle     Gram Stain Anaerobic Bottle Gram negative bacilli    Susceptibility      Escherichia coli     VENKAT     Ampicillin Resistant      Ampicillin + Sulbactam Intermediate     Cefepime Susceptible     Ceftazidime Susceptible     Ceftriaxone Susceptible     Gentamicin Susceptible     Levofloxacin Intermediate     Piperacillin + Tazobactam Susceptible     Trimethoprim + Sulfamethoxazole Susceptible                Susceptibility Comments     Escherichia coli    Cefazolin sensitivity will not be reported for Enterobacteriaceae in non-urine isolates. If cefazolin is preferred, please call the microbiology lab to request an E-test.             POC Glucose Once [[id number]]  (Abnormal) Collected:  01/10/20 2047    Specimen:  Blood Updated:  01/10/20 2049     Glucose 152 mg/dL     Blood Culture - Blood, Arm, Right [[id number]] Collected:  01/09/20 1641    Specimen:  Blood from Arm, Right Updated:  01/10/20 1700     Blood Culture No growth at 24 hours    POC Glucose Once [[id number]]  (Abnormal) Collected:  01/10/20 1619    Specimen:  Blood Updated:  01/10/20 1621     Glucose 132 mg/dL     POC Glucose Once [[id number]]  (Abnormal) Collected:  01/10/20 1131    Specimen:  Blood Updated:  01/10/20 1149     Glucose 138 mg/dL     CK [[id number]]  (Normal) Collected:  01/10/20 0642    Specimen:  Blood from Hand, Right Updated:  01/10/20 0806     Creatine Kinase 38 U/L     Basic Metabolic Panel [[id number]]  (Abnormal) Collected:  01/10/20 0642    Specimen:  Blood from Hand, Right Updated:  01/10/20 0731     Glucose 105 mg/dL      BUN 20 mg/dL      Creatinine 0.64 mg/dL      Sodium 137 mmol/L      Potassium 4.2 mmol/L      Chloride 101 mmol/L      CO2 28.9 mmol/L      Calcium 9.3 mg/dL      eGFR  African Amer 146 mL/min/1.73      eGFR Non African Amer 121 mL/min/1.73      BUN/Creatinine Ratio 31.3     Anion Gap 7.1 mmol/L     Narrative:       GFR Normal >60  Chronic Kidney Disease <60  Kidney Failure <15      Hemoglobin A1c [[id number]]  (Normal) Collected:  01/10/20 0642    Specimen:  Blood from Hand, Right Updated:  01/10/20 0703     Hemoglobin A1C 4.80 %     Narrative:        Hemoglobin A1C Ranges:    Increased Risk for Diabetes  5.7% to 6.4%  Diabetes                     >= 6.5%  Diabetic Goal                < 7.0%    CBC (No Diff) [[id number]]  (Abnormal) Collected:  01/10/20 0642    Specimen:  Blood from Hand, Right Updated:  01/10/20 0701     WBC 10.35 10*3/mm3      RBC 3.65 10*6/mm3      Hemoglobin 10.8 g/dL      Hematocrit 31.0 %      MCV 84.9 fL      MCH 29.6 pg      MCHC 34.8 g/dL      RDW 12.2 %      RDW-SD 37.4 fl      MPV 8.8 fL      Platelets 290 10*3/mm3     POC Glucose Once [[id number]]  (Normal) Collected:  01/10/20 0638    Specimen:  Blood Updated:  01/10/20 0639     Glucose 91 mg/dL     Urine Culture - Urine, Urine, Catheter [[id number]]  (Abnormal)  (Susceptibility) Collected:  01/08/20 1349    Specimen:  Urine, Catheter Updated:  01/10/20 0231     Urine Culture >100,000 CFU/mL Escherichia coli    Narrative:             Susceptibility      Escherichia coli     VENKAT     Ampicillin Resistant     Ampicillin + Sulbactam Intermediate     Cefazolin Susceptible     Cefepime Susceptible     Ceftazidime Susceptible     Ceftriaxone Susceptible     Gentamicin Susceptible     Levofloxacin Intermediate     Nitrofurantoin Susceptible     Piperacillin + Tazobactam Susceptible     Tetracycline Susceptible     Trimethoprim + Sulfamethoxazole Susceptible                    Potassium [[id number]]  (Normal) Collected:  01/09/20 2113    Specimen:  Blood Updated:  01/09/20 2140     Potassium 4.1 mmol/L     POC Glucose Once [[id number]]  (Normal) Collected:  01/09/20 2120    Specimen:  Blood Updated:  01/09/20 2123     Glucose 112 mg/dL     POC Glucose Once [[id number]]  (Normal) Collected:  01/09/20 1634    Specimen:  Blood Updated:  01/09/20 1638     Glucose 130 mg/dL     Troponin [[id number]]  (Abnormal) Collected:  01/09/20 1308    Specimen:  Blood Updated:  01/09/20 1443     Troponin T 0.076 ng/mL     Narrative:       Troponin T Reference Range:  <= 0.03 ng/mL-   Negative for AMI  >0.03  ng/mL-     Abnormal for myocardial necrosis.  Clinicians would have to utilize clinical acumen, EKG, Troponin and serial changes to determine if it is an Acute Myocardial Infarction or myocardial injury due to an underlying chronic condition.       Results may be falsely decreased if patient taking Biotin.      CK [[id number]]  (Normal) Collected:  01/09/20 1308    Specimen:  Blood Updated:  01/09/20 1420     Creatine Kinase 79 U/L     Hepatic Function Panel [[id number]]  (Abnormal) Collected:  01/09/20 1308    Specimen:  Blood Updated:  01/09/20 1401     Total Protein 5.7 g/dL      Albumin 2.50 g/dL      ALT (SGPT) 5 U/L      AST (SGOT) 14 U/L      Alkaline Phosphatase 143 U/L      Total Bilirubin 0.5 mg/dL      Bilirubin, Direct 0.3 mg/dL      Bilirubin, Indirect 0.2 mg/dL     POC Glucose Once [[id number]]  (Abnormal) Collected:  01/09/20 1135    Specimen:  Blood Updated:  01/09/20 1147     Glucose 135 mg/dL     Potassium [[id number]]  (Abnormal) Collected:  01/09/20 0845    Specimen:  Blood Updated:  01/09/20 0906     Potassium 3.4 mmol/L     Blood Culture ID, PCR - Blood, Arm, Left [[id number]]  (Abnormal) Collected:  01/08/20 1316    Specimen:  Blood from Arm, Left Updated:  01/09/20 0713     BCID, PCR Escherichia coli. Identification by BCID PCR.    Troponin [[id number]]  (Abnormal) Collected:  01/09/20 0553    Specimen:  Blood Updated:  01/09/20 0710     Troponin T 0.080 ng/mL     Narrative:       Troponin T Reference Range:  <= 0.03 ng/mL-   Negative for AMI  >0.03 ng/mL-     Abnormal for myocardial necrosis.  Clinicians would have to utilize clinical acumen, EKG, Troponin and serial changes to determine if it is an Acute Myocardial Infarction or myocardial injury due to an underlying chronic condition.       Results may be falsely decreased if patient taking Biotin.      CBC (No Diff) [[id number]]  (Abnormal) Collected:  01/09/20 0553    Specimen:  Blood Updated:  01/09/20 0709     WBC 18.46 10*3/mm3      RBC 4.12  10*6/mm3      Hemoglobin 12.3 g/dL      Hematocrit 35.8 %      MCV 86.9 fL      MCH 29.9 pg      MCHC 34.4 g/dL      RDW 12.3 %      RDW-SD 39.0 fl      MPV 8.9 fL      Platelets 389 10*3/mm3     Basic Metabolic Panel [[id number]]  (Abnormal) Collected:  01/09/20 0553    Specimen:  Blood Updated:  01/09/20 0656     Glucose 128 mg/dL      BUN 19 mg/dL      Creatinine 0.83 mg/dL      Sodium 141 mmol/L      Potassium 3.4 mmol/L      Chloride 99 mmol/L      CO2 29.6 mmol/L      Calcium 9.6 mg/dL      eGFR  African Amer 108 mL/min/1.73      eGFR Non African Amer 89 mL/min/1.73      BUN/Creatinine Ratio 22.9     Anion Gap 12.4 mmol/L     Narrative:       GFR Normal >60  Chronic Kidney Disease <60  Kidney Failure <15      POC Glucose Once [[id number]]  (Normal) Collected:  01/09/20 0647    Specimen:  Blood Updated:  01/09/20 0649     Glucose 116 mg/dL     Potassium [[id number]]  (Abnormal) Collected:  01/08/20 2339    Specimen:  Blood Updated:  01/09/20 0006     Potassium 3.2 mmol/L     Myoglobin Screen, Urine - Urine, Clean Catch [[id number]]  (Abnormal) Collected:  01/08/20 1350    Specimen:  Urine, Clean Catch Updated:  01/08/20 2345     Myoglobin, Qualitative Positive    Narrative:       Due to the similarities in the chemical properties of Hemoglobin and and Myoglobin, the presence of either will yield a positive Myoglobin Screen.    POC Glucose Once [[id number]]  (Normal) Collected:  01/08/20 2043    Specimen:  Blood Updated:  01/08/20 2101     Glucose 127 mg/dL     Respiratory Panel, PCR - Swab, Nasopharynx [[id number]]  (Normal) Collected:  01/08/20 1655    Specimen:  Swab from Nasopharynx Updated:  01/08/20 1916     ADENOVIRUS, PCR Not Detected     Coronavirus 229E Not Detected     Coronavirus HKU1 Not Detected     Coronavirus NL63 Not Detected     Coronavirus OC43 Not Detected     Human Metapneumovirus Not Detected     Human Rhinovirus/Enterovirus Not Detected     Influenza B PCR Not Detected     Parainfluenza Virus 1  Not Detected     Parainfluenza Virus 2 Not Detected     Parainfluenza Virus 3 Not Detected     Parainfluenza Virus 4 Not Detected     Bordetella pertussis pcr Not Detected     Influenza A H1 2009 PCR Not Detected     Chlamydophila pneumoniae PCR Not Detected     Mycoplasma pneumo by PCR Not Detected     Influenza A PCR Not Detected     Influenza A H3 Not Detected     Influenza A H1 Not Detected     RSV, PCR Not Detected     Bordetella parapertussis PCR Not Detected    Gamma GT [[id number]]  (Normal) Collected:  01/08/20 1223    Specimen:  Blood Updated:  01/08/20 1842     GGT 61 U/L     POC Glucose Once [[id number]]  (Abnormal) Collected:  01/08/20 1821    Specimen:  Blood Updated:  01/08/20 1841     Glucose 134 mg/dL     Lactic Acid, Reflex [[id number]]  (Abnormal) Collected:  01/08/20 1655    Specimen:  Blood Updated:  01/08/20 1757     Lactate 2.1 mmol/L         Data Review:  Results from last 7 days   Lab Units 01/12/20  0432 01/11/20  0433 01/10/20  0642   SODIUM mmol/L 137 138 137   POTASSIUM mmol/L 4.4 4.3 4.2   CHLORIDE mmol/L 101 104 101   CO2 mmol/L 26.8 28.1 28.9   BUN mg/dL 13 18 20   CREATININE mg/dL 0.69* 0.59* 0.64*   GLUCOSE mg/dL 100* 142* 105*   CALCIUM mg/dL 9.3 9.1 9.3     Results from last 7 days   Lab Units 01/12/20  0432 01/11/20  0433 01/10/20  0642   WBC 10*3/mm3 8.34 7.50 10.35   HEMOGLOBIN g/dL 10.4* 9.7* 10.8*   HEMATOCRIT % 31.4* 29.7* 31.0*   PLATELETS 10*3/mm3 264 281 290         Results from last 7 days   Lab Units 01/10/20  0642   HEMOGLOBIN A1C % 4.80     Lab Results   Lab Value Date/Time    TROPONINT 0.076 (C) 01/09/2020 1308    TROPONINT 0.080 (C) 01/09/2020 0553    TROPONINT 0.040 (C) 01/08/2020 1223    TROPONINT 0.033 (C) 06/13/2019 0613    TROPONINT 0.044 (C) 06/12/2019 2021         Results from last 7 days   Lab Units 01/09/20  1308 01/08/20  1223   ALK PHOS U/L 143* 154*   BILIRUBIN mg/dL 0.5 0.8   BILIRUBIN DIRECT mg/dL 0.3  --    ALT (SGPT) U/L 5 8   AST (SGOT) U/L 14 16          Results from last 7 days   Lab Units 01/10/20  0642   HEMOGLOBIN A1C % 4.80     Glucose   Date/Time Value Ref Range Status   01/12/2020 1150 96 70 - 130 mg/dL Final   01/12/2020 0700 102 70 - 130 mg/dL Final   01/11/2020 2124 126 70 - 130 mg/dL Final   01/11/2020 1635 107 70 - 130 mg/dL Final   01/11/2020 1206 136 (H) 70 - 130 mg/dL Final   01/11/2020 0622 126 70 - 130 mg/dL Final   01/10/2020 2047 152 (H) 70 - 130 mg/dL Final   01/10/2020 1619 132 (H) 70 - 130 mg/dL Final     Results from last 7 days   Lab Units 01/08/20  1223   INR  1.16*       Past Medical History:   Diagnosis Date   • Aortic stenosis    • Diabetes mellitus (CMS/HCC)    • Hypertension        Assessment:  Active Hospital Problems    Diagnosis  POA   • **Bacteremia due to Escherichia coli [R78.81]  Yes   • E. coli UTI (urinary tract infection) [N39.0, B96.20]  Yes   • Acute urinary retention [R33.8]  Yes   • Left lower lobe pulmonary infiltrate [R91.8]  Yes   • Non-traumatic rhabdomyolysis [M62.82]  Yes   • Lesion of left native kidney [N28.9]  Yes   • Metabolic encephalopathy [G93.41]  Yes   • Sepsis without acute organ dysfunction (CMS/HCC) [A41.9]  Yes   • History of noncompliance with medical treatment [Z91.19]  Not Applicable   • Elevated troponin [R79.89]  Yes   • Aortic stenosis [I35.0]  Yes   • Elevated alkaline phosphatase level [R74.8]  Yes   • Type 2 diabetes mellitus, without long-term current use of insulin (CMS/HCC) [E11.9]  Yes   • HTN (hypertension) [I10]  Yes   • Hypokalemia [E87.6]  Yes      Resolved Hospital Problems   No resolved problems to display.       Plan:  Continue with antibiotics as per ID. Follow lab. Plans as per ID.Will need SNU for rehab.    Fran Alan MD  1/12/2020  3:09 PM

## 2020-01-12 NOTE — PLAN OF CARE
Problem: Patient Care Overview  Goal: Plan of Care Review  Outcome: Ongoing (interventions implemented as appropriate)  Flowsheets (Taken 1/12/2020 0421)  Progress: improving  Plan of Care Reviewed With: patient  Outcome Summary: No complaints overnight. Skin care provided as ordered, Q 2 turns. IVF continued. Multiple incontinence episodes, with good urine output this shift. Has not required straight cath. VSS, will ct to monitor.

## 2020-01-12 NOTE — PROGRESS NOTES
[facility] Progress Note    01/12/20 10:01 AM        Patient was asleep I did not wake him up.  I did talk to his daughter yesterday.  He has now started to void and his residuals are very acceptable range and not requiring intermittent catheterization since yesterday afternoon.    We will continue with twice daily Flomax.  We are following for now.

## 2020-01-12 NOTE — THERAPY TREATMENT NOTE
Patient Name: Raymon Solis  : 1940    MRN: [id number]                              Today's Date: 2020       Admit Date: 2020    Visit Dx:     ICD-10-CM ICD-9-CM   1. Sepsis without acute organ dysfunction, due to unspecified organism (CMS/[facility]) A41.9 038.9     995.91   2. Pneumonia of left lower lobe due to infectious organism (CMS/[facility]) J18.1 486   3. Atrial fibrillation, unspecified type (CMS/[facility]) I48.91 427.31     Patient Active Problem List   Diagnosis   • Acute pyelonephritis   • HTN (hypertension)   • Type 2 diabetes mellitus, without long-term current use of insulin (CMS/[facility])   • Hypokalemia   • Elevated alkaline phosphatase level   • Aortic stenosis   • Acute UTI   • Wandering atrial pacemaker   • Elevated troponin   • Sepsis without acute organ dysfunction (CMS/[facility])   • History of noncompliance with medical treatment   • E. coli UTI (urinary tract infection)   • Bacteremia due to Escherichia coli   • Acute urinary retention   • Left lower lobe pulmonary infiltrate   • Non-traumatic rhabdomyolysis   • Lesion of left native kidney   • Metabolic encephalopathy     Past Medical History:   Diagnosis Date   • Aortic stenosis    • Diabetes mellitus (CMS/[facility])    • Hypertension      Past Surgical History:   Procedure Laterality Date   • CATARACT EXTRACTION, BILATERAL       General Information     Row Name 20 1320          PT Evaluation Time/Intention    Document Type  therapy note (daily note)  -     Mode of Treatment  physical therapy  -     Row Name 20 1320          General Information    Existing Precautions/Restrictions  fall  -     Row Name 20 1320          Cognitive Assessment/Intervention- PT/OT    Orientation Status (Cognition)  oriented to;person;place  -     Row Name 20 132          Safety Issues, Functional Mobility    Impairments Affecting Function (Mobility)  balance;endurance/activity tolerance  -       User Key  (r) = Recorded By, (t) = Taken By,  (c) = Cosigned By    Initials Name Provider Type     Felix Mckeon, ANN Physical Therapy Assistant        Mobility     Row Name 01/12/20 1321          Bed Mobility Assessment/Treatment    Scooting/Bridging [place] (Bed Mobility)  dependent (less than 25% patient effort)  -     Supine-Sit [place] (Bed Mobility)  moderate assist (50% patient effort);verbal cues  -     Sit-Supine [place] (Bed Mobility)  minimum assist (75% patient effort);verbal cues  -     Assistive Device (Bed Mobility)  bed rails;draw sheet;head of bed elevated  -     Row Name 01/12/20 132          Bed-Chair Transfer    Bed-Chair [place] (Transfers)  not tested  -     Row Name 01/12/20 132          Sit-Stand Transfer    Sit-Stand [place] (Transfers)  minimum assist (75% patient effort);verbal cues  -     Row Name 01/12/20 132          Gait/Stairs Assessment/Training    [place] Level (Gait)  not tested  -       User Key  (r) = Recorded By, (t) = Taken By, (c) = Cosigned By    Initials Name Provider Type     Felix Mckeon, ANN Physical Therapy Assistant        Obj/Interventions     Row Name 01/12/20 132          Static Sitting Balance    Level of [place] (Unsupported Sitting, Static Balance)  standby assist  -     Sitting Position (Unsupported Sitting, Static Balance)  sitting on edge of bed  -     Time Able to Maintain Position (Unsupported Sitting, Static Balance)  more than 5 minutes  -     Row Name 01/12/20 132          Static Standing Balance    Level of [place] (Supported Standing, Static Balance)  minimal assist, 75% patient effort  -     Time Able to Maintain Position (Supported Standing, Static Balance)  45 to 60 seconds x2  -       User Key  (r) = Recorded By, (t) = Taken By, (c) = Cosigned By    Initials Name Provider Type     Felix Mckeon, ANN Physical Therapy Assistant        Goals/Plan    No documentation.       Clinical Impression     Row Name  01/12/20 1322          Pain Scale: Numbers Pre/Post-Treatment    Pain Scale: Numbers, Pretreatment  0/10 - no pain  -RH     Pain Scale: Numbers, Post-Treatment  0/10 - no pain  -RH     Pre/Post Treatment Pain Comment  c/o fatigue  -     Row Name 01/12/20 1322          Vital Signs    Intra SpO2 (%)  96  -RH     O2 Delivery Intra Treatment  room air  -     Row Name 01/12/20 1322          Positioning and Restraints    Pre-Treatment Position  in bed  -RH     Post Treatment Position  bed  -RH     In Bed  fowlers;call light within reach;encouraged to call for assist;exit alarm on;side rails up x2;waffle boots/both  -       User Key  (r) = Recorded By, (t) = Taken By, (c) = Cosigned By    Initials Name Provider Type    Felix Rowan PTA Physical Therapy Assistant        Outcome Measures     Row Name 01/12/20 1324          How much help from another person do you currently need...    Turning from your back to your side while in flat bed without using bedrails?  2  -RH     Moving from lying on back to sitting on the side of a flat bed without bedrails?  2  -RH     Moving to and from a bed to a chair (including a wheelchair)?  2  -RH     Standing up from a chair using your arms (e.g., wheelchair, bedside chair)?  3  -RH     Climbing 3-5 steps with a railing?  1  -RH     To walk in hospital room?  2  -RH     AM-PAC 6 Clicks Score (PT)  12  -RH       User Key  (r) = Recorded By, (t) = Taken By, (c) = Cosigned By    Initials Name Provider Type    Felix Rowan PTA Physical Therapy Assistant          PT Recommendation and Plan           Time Calculation:   PT Charges     Row Name 01/12/20 1324             Time Calculation    Start Time  1308  -RH      Stop Time  1325  -RH      Time Calculation (min)  17 min  -RH      PT Received On  01/12/20  -      PT - Next Appointment  01/13/20  -        User Key  (r) = Recorded By, (t) = Taken By, (c) = Cosigned By    Initials Name Provider Type    EL Mckeon  Felix MENENDEZ, ANN Physical Therapy Assistant        Therapy Charges for Today     Code Description Service Date Service Provider Modifiers Qty    08264809390 HC PT THER PROC EA 15 MIN 1/12/2020 Felix Mckeon, PTA GP 1          PT G-Codes  Outcome Measure Options: AM-PAC 6 Clicks Basic Mobility (PT)  AM-PAC 6 Clicks Score (PT): 12  AM-PAC 6 Clicks Score (OT): 9    Felix Mckeon PTA  1/12/2020

## 2020-01-13 LAB
ANION GAP SERPL CALCULATED.3IONS-SCNC: 6.2 MMOL/L (ref 5–15)
BACTERIA SPEC AEROBE CULT: NORMAL
BASOPHILS # BLD AUTO: 0.04 10*3/MM3 (ref 0–0.2)
BASOPHILS NFR BLD AUTO: 0.5 % (ref 0–1.5)
BUN BLD-MCNC: 14 MG/DL (ref 8–23)
BUN/CREAT SERPL: 21.9 (ref 7–25)
CALCIUM SPEC-SCNC: 9.2 MG/DL (ref 8.6–10.5)
CHLORIDE SERPL-SCNC: 100 MMOL/L (ref 98–107)
CO2 SERPL-SCNC: 25.8 MMOL/L (ref 22–29)
CREAT BLD-MCNC: 0.64 MG/DL (ref 0.76–1.27)
DEPRECATED RDW RBC AUTO: 40.2 FL (ref 37–54)
EOSINOPHIL # BLD AUTO: 0.39 10*3/MM3 (ref 0–0.4)
EOSINOPHIL NFR BLD AUTO: 4.7 % (ref 0.3–6.2)
ERYTHROCYTE [DISTWIDTH] IN BLOOD BY AUTOMATED COUNT: 12.5 % (ref 12.3–15.4)
GFR SERPL CREATININE-BSD FRML MDRD: 121 ML/MIN/1.73
GFR SERPL CREATININE-BSD FRML MDRD: 146 ML/MIN/1.73
GLUCOSE BLD-MCNC: 119 MG/DL (ref 65–99)
GLUCOSE BLDC GLUCOMTR-MCNC: 123 MG/DL (ref 70–130)
GLUCOSE BLDC GLUCOMTR-MCNC: 139 MG/DL (ref 70–130)
GLUCOSE BLDC GLUCOMTR-MCNC: 157 MG/DL (ref 70–130)
GLUCOSE BLDC GLUCOMTR-MCNC: 175 MG/DL (ref 70–130)
HCT VFR BLD AUTO: 29.4 % (ref 37.5–51)
HGB BLD-MCNC: 9.7 G/DL (ref 13–17.7)
IMM GRANULOCYTES # BLD AUTO: 0.05 10*3/MM3 (ref 0–0.05)
IMM GRANULOCYTES NFR BLD AUTO: 0.6 % (ref 0–0.5)
LYMPHOCYTES # BLD AUTO: 1.34 10*3/MM3 (ref 0.7–3.1)
LYMPHOCYTES NFR BLD AUTO: 16.2 % (ref 19.6–45.3)
MCH RBC QN AUTO: 29.5 PG (ref 26.6–33)
MCHC RBC AUTO-ENTMCNC: 33 G/DL (ref 31.5–35.7)
MCV RBC AUTO: 89.4 FL (ref 79–97)
MONOCYTES # BLD AUTO: 0.82 10*3/MM3 (ref 0.1–0.9)
MONOCYTES NFR BLD AUTO: 9.9 % (ref 5–12)
NEUTROPHILS # BLD AUTO: 5.63 10*3/MM3 (ref 1.7–7)
NEUTROPHILS NFR BLD AUTO: 68.1 % (ref 42.7–76)
NRBC BLD AUTO-RTO: 0 /100 WBC (ref 0–0.2)
PLATELET # BLD AUTO: 287 10*3/MM3 (ref 140–450)
PMV BLD AUTO: 9.6 FL (ref 6–12)
POTASSIUM BLD-SCNC: 4.4 MMOL/L (ref 3.5–5.2)
RBC # BLD AUTO: 3.29 10*6/MM3 (ref 4.14–5.8)
SODIUM BLD-SCNC: 132 MMOL/L (ref 136–145)
WBC NRBC COR # BLD: 8.27 10*3/MM3 (ref 3.4–10.8)

## 2020-01-13 PROCEDURE — 63710000001 INSULIN LISPRO (HUMAN) PER 5 UNITS: Performed by: INTERNAL MEDICINE

## 2020-01-13 PROCEDURE — 25010000003 CEFTRIAXONE PER 250 MG: Performed by: INTERNAL MEDICINE

## 2020-01-13 PROCEDURE — 80048 BASIC METABOLIC PNL TOTAL CA: CPT | Performed by: HOSPITALIST

## 2020-01-13 PROCEDURE — 82962 GLUCOSE BLOOD TEST: CPT

## 2020-01-13 PROCEDURE — 99232 SBSQ HOSP IP/OBS MODERATE 35: CPT | Performed by: INTERNAL MEDICINE

## 2020-01-13 PROCEDURE — 85025 COMPLETE CBC W/AUTO DIFF WBC: CPT | Performed by: HOSPITALIST

## 2020-01-13 RX ORDER — POLYETHYLENE GLYCOL 3350 17 G/17G
17 POWDER, FOR SOLUTION ORAL DAILY PRN
Status: DISCONTINUED | OUTPATIENT
Start: 2020-01-13 | End: 2020-01-16 | Stop reason: HOSPADM

## 2020-01-13 RX ADMIN — INSULIN LISPRO 2 UNITS: 100 INJECTION, SOLUTION INTRAVENOUS; SUBCUTANEOUS at 18:26

## 2020-01-13 RX ADMIN — PETROLATUM 100 APPLICATION: 420 OINTMENT TOPICAL at 09:23

## 2020-01-13 RX ADMIN — INSULIN LISPRO 2 UNITS: 100 INJECTION, SOLUTION INTRAVENOUS; SUBCUTANEOUS at 22:18

## 2020-01-13 RX ADMIN — BRIMONIDINE TARTRATE 1 DROP: 2 SOLUTION OPHTHALMIC at 22:19

## 2020-01-13 RX ADMIN — PETROLATUM 100 APPLICATION: 420 OINTMENT TOPICAL at 22:19

## 2020-01-13 RX ADMIN — SODIUM CHLORIDE, PRESERVATIVE FREE 10 ML: 5 INJECTION INTRAVENOUS at 22:19

## 2020-01-13 RX ADMIN — BRIMONIDINE TARTRATE 1 DROP: 2 SOLUTION OPHTHALMIC at 09:23

## 2020-01-13 RX ADMIN — CEFTRIAXONE SODIUM 2 G: 2 INJECTION, SOLUTION INTRAVENOUS at 14:00

## 2020-01-13 RX ADMIN — TAMSULOSIN HYDROCHLORIDE 0.4 MG: 0.4 CAPSULE ORAL at 09:22

## 2020-01-13 RX ADMIN — BRIMONIDINE TARTRATE 1 DROP: 2 SOLUTION OPHTHALMIC at 18:26

## 2020-01-13 RX ADMIN — SODIUM CHLORIDE, PRESERVATIVE FREE 10 ML: 5 INJECTION INTRAVENOUS at 09:23

## 2020-01-13 RX ADMIN — LATANOPROST 1 DROP: 50 SOLUTION OPHTHALMIC at 22:19

## 2020-01-13 RX ADMIN — ASPIRIN 81 MG: 81 TABLET, COATED ORAL at 09:22

## 2020-01-13 RX ADMIN — ACETAMINOPHEN 650 MG: 325 TABLET, FILM COATED ORAL at 09:28

## 2020-01-13 RX ADMIN — TAMSULOSIN HYDROCHLORIDE 0.4 MG: 0.4 CAPSULE ORAL at 22:18

## 2020-01-13 NOTE — PROGRESS NOTES
[facility] Progress Note    01/13/20 11:14 AM        Patient has been voiding much better.  I do not see any documentation that he is required intermittent cath any further and remains on 2 Flomax a day.  From my standpoint he can be discharged to wherever his disposition has dictated such.  May consider a UroLift in the office later to try to get him off the Flomax and prevent this retention from happening again.  I would like to see him in the office with family in the next 3 to 4 weeks to discuss this further.

## 2020-01-13 NOTE — DISCHARGE PLACEMENT REQUEST
"Salbador Solis (79 y.o. Male)     Date of Birth Social Security Number Address Home Phone MRN    1940  [id number] [address]  [id number]    Episcopalian Marital Status          Holiness        Admission Date Admission Type Admitting Provider Attending Provider Department, Room/Bed    1/8/20 Emergency Bradley Pearce MD Baumann, Patrick D, MD [address], [address]/1    Discharge Date Discharge Disposition Discharge Destination                       Attending Provider:  Eric Unger MD    Allergies:  No Known Allergies    Isolation:  None   Infection:  None   Code Status:  CPR    Ht:  162.6 cm (64\")   Wt:  71.5 kg (157 lb 11.2 oz)    Admission Cmt:  None   Principal Problem:  Bacteremia due to Escherichia coli [R78.81]                 Active Insurance as of 1/8/2020     Primary Coverage     Payor Plan Insurance Group Employer/Plan Group    [employer] MEDICARE REPLACEMENT HU[employer]NA [facility] [place] MEDICARE REPLACEMENT NON PAR [id number]     Payor Plan Address Payor Plan Phone Number Payor Plan Fax Number Effective Dates       1/1/2018 - None Entered    Subscriber Name Subscriber Birth Date Member ID       SALBADOR SOLIS 1940 [id number]                 Emergency Contacts      (Rel.) Home Phone Work Phone Mobile Phone    MARTIN GOLDEN (Daughter) -- -- [phone]              "

## 2020-01-13 NOTE — PLAN OF CARE
Problem: Patient Care Overview  Goal: Plan of Care Review  Outcome: Ongoing (interventions implemented as appropriate)  Flowsheets (Taken 1/13/2020 7777)  Progress: improving  Plan of Care Reviewed With: patient  Outcome Summary: No new issues overnight. Still voiding without difficulty, multiple incontinence episodes overnight with good urine output. IVF continued per order. Skin care provided and Q 2 turns. Blood glucose monitoring, no SSI required. VSS, will ct to monitor.

## 2020-01-13 NOTE — PROGRESS NOTES
LOS: 5 days     Chief Complaint:  Follow-up E coli septicemia due to  source    Interval History: He says he feels tired. No fever. WBC normal. He is tolerating ceftriaxone w/o rash. No further catheterizations needed as passing his urine spontaneously has improved with Flomax.     ROS: no n/v/d    Vital Signs  Temp:  [97.9 °F (36.6 °C)-98.4 °F (36.9 °C)] 98 °F (36.7 °C)  Heart Rate:  [75] 75  Resp:  [18-20] 18  BP: (126-173)/(63-99) 126/63    Physical Exam:  General: awake, NAD  Cardiovascular: NR, RR, 3/6 JODY, no LE edema  Respiratory:  Breathing comfortably on room air  GI: Abdomen is soft, non-tender, non-distended  : no Severino  Skin: No rashes    Antibiotics:  •  cefTRIAXone (ROCEPHIN) IVPB 2 g, 2 g, Intravenous, Q24H    LABS:  CBC, BMP,micro reviewed today  Lab Results   Component Value Date    WBC 8.27 01/13/2020    HGB 9.7 (L) 01/13/2020    HCT 29.4 (L) 01/13/2020    MCV 89.4 01/13/2020     01/13/2020     Lab Results   Component Value Date    GLUCOSE 119 (H) 01/13/2020    BUN 14 01/13/2020    CREATININE 0.64 (L) 01/13/2020    EGFRIFNONA 121 01/13/2020    EGFRIFAFRI 146 01/13/2020    BCR 21.9 01/13/2020    CO2 25.8 01/13/2020    CALCIUM 9.2 01/13/2020    ALBUMIN 2.50 (L) 01/09/2020    LABIL2 0.8 (L) 11/06/2019    AST 14 01/09/2020    ALT 5 01/09/2020     Lab Results   Component Value Date    HGBA1C 4.80 01/10/2020     Microbiology:  1/8 RVP: negative  1/8 BCx: E coli   1/8 UCx: 100k E coli (sensitive to ceftriaxone, cefazolin, Zosyn, bactrim; intermediate to levofloxacin; resistant to ampicillin)  1/9 BCx: negative    Radiology (personally reviewed report):   CT A/P 1/10 showed left upper pole kidney lesion consistent with proteinaceous or hemorrhagic cyst. Right kidney enhancement consistent with pyelonephritis. Non-obstructing 3 mm calculus on the right.     Assessment/Plan   1. E coli septicemia due to R pyelonephritis  2. Controlled DM2  3 Aortic stenosis  4. Left renal mass - proteinaceous or  hemorrhagic cyst    From an infection standpoint, he is improved. No fever. WBC normalized. He has sterilized his blood cultures. Continue ceftriaxone 2 g IV q24h through 1/17/20 to complete a 10-day total course. Spoke with RN. We'll find out from CCP if he can go to his facility with a peripheral IV or if they prefer midline catheter or PICC line. His facility can remove the intravenous access after the last dose of antibiotics Due to the short course remaining, no weekly monitoring labs will be needed.     I appreciate urology's care for the patient.     Thank you for allowing me to be involved in the care of this patient. Infectious diseases will sign off at this time with antibiotics plan in place but please call me at [phone] if any further questions.

## 2020-01-13 NOTE — PROGRESS NOTES
Continued Stay Note  [facility]     Patient Name: Raymon Solis  MRN: [id number]  Today's Date: 1/13/2020    Admit Date: 1/8/2020    Discharge Plan     Row Name 01/13/20 1540       Plan    Plan  Skilled rehab.  Referral made to [facility]      Plan Comments  CCP spoke with pt's Layla by phone to discuss additional referrals.  [facility]   Awaiting facility to approve, then pt will need precert.  Rachell Chan RN/ CCP         Discharge Codes    No documentation.             Rachell Chan, RN

## 2020-01-13 NOTE — PROGRESS NOTES
Name: Raymon Solis ADMIT: 2020   : 1940  PCP: Magali Burgess MD    MRN: [id number] LOS: 5 days   AGE/SEX: 79 y.o. male  ROOM: [facility]   Subjective   Chief Complaint   Patient presents with   • Weakness - Generalized      Resting comfortably. Awakens and was answering questions appropriately. Denies CP SOA NVD and abdominal pain.    Objective   Vital Signs  Temp:  [97.8 °F (36.6 °C)-98.4 °F (36.9 °C)] 97.8 °F (36.6 °C)  Heart Rate:  [65-75] 65  Resp:  [16-18] 16  BP: (126-165)/(63-85) 140/75  SpO2:  [97 %-98 %] 98 %  on   ;   Device (Oxygen Therapy): room air  Body mass index is 27.07 kg/m².    Physical Exam   Constitutional: He appears well-developed. No distress.   HENT:   Head: Atraumatic.   Nose: Nose normal.   Eyes: Conjunctivae and EOM are normal.   Neck: Neck supple. No tracheal deviation present.   Cardiovascular: Normal rate and regular rhythm.   Pulmonary/Chest: Effort normal. He has no wheezes.   Abdominal: Soft. He exhibits no distension.   Musculoskeletal: He exhibits no edema or tenderness.   Neurological: He is alert.   Skin: Skin is warm and dry.   Nursing note and vitals reviewed.      Results Review:       I reviewed the patient's new clinical results.      Results from last 7 days   Lab Units 01/13/20  0430 01/12/20  0432 01/11/20  0433 01/10/20  0642   WBC 10*3/mm3 8.27 8.34 7.50 10.35   HEMOGLOBIN g/dL 9.7* 10.4* 9.7* 10.8*   PLATELETS 10*3/mm3 287 264 281 290     Results from last 7 days   Lab Units 01/13/20  0430 01/12/20  0432 01/11/20  0433 01/10/20  0642   SODIUM mmol/L 132* 137 138 137   POTASSIUM mmol/L 4.4 4.4 4.3 4.2   CHLORIDE mmol/L 100 101 104 101   CO2 mmol/L 25.8 26.8 28.1 28.9   BUN mg/dL 14 13 18 20   CREATININE mg/dL 0.64* 0.69* 0.59* 0.64*   GLUCOSE mg/dL 119* 100* 142* 105*   Estimated Creatinine Clearance: 67.9 mL/min (A) (by C-G formula based on SCr of 0.64 mg/dL (L)).  Results from last 7 days   Lab Units 20  0430 20  0432 20  0433  01/10/20  0642 01/09/20  1308  01/08/20  1223   CALCIUM mg/dL 9.2 9.3 9.1 9.3  --    < > 8.5*   ALBUMIN g/dL  --   --   --   --  2.50*  --  2.70*   MAGNESIUM mg/dL  --   --   --   --   --   --  1.6    < > = values in this interval not displayed.         aspirin 81 mg Oral Daily   brimonidine 1 drop Both Eyes TID   cefTRIAXone 2 g Intravenous Q24H   insulin lispro 0-9 Units Subcutaneous 4x Daily With Meals & Nightly   latanoprost 1 drop Both Eyes Nightly   Petrolatum  Topical Q12H   sodium chloride 10 mL Intravenous Q12H   tamsulosin 0.4 mg Oral BID      Diet Soft Texture; Chopped; Thin; Cardiac, Consistent Carbohydrate    Assessment/Plan      Active Hospital Problems    Diagnosis  POA   • **Bacteremia due to Escherichia coli [R78.81]  Yes   • E. coli UTI (urinary tract infection) [N39.0, B96.20]  Yes   • Acute urinary retention [R33.8]  Yes   • Left lower lobe pulmonary infiltrate [R91.8]  Yes   • Non-traumatic rhabdomyolysis [M62.82]  Yes   • Lesion of left native kidney [N28.9]  Yes   • Metabolic encephalopathy [G93.41]  Yes   • Sepsis without acute organ dysfunction (CMS/HCC) [A41.9]  Yes   • History of noncompliance with medical treatment [Z91.19]  Not Applicable   • Elevated troponin [R79.89]  Yes   • Aortic stenosis [I35.0]  Yes   • Elevated alkaline phosphatase level [R74.8]  Yes   • Type 2 diabetes mellitus, without long-term current use of insulin (CMS/HCC) [E11.9]  Yes   • HTN (hypertension) [I10]  Yes   • Hypokalemia [E87.6]  Yes      Resolved Hospital Problems   No resolved problems to display.       · R Pyelonephritis/Ecoli Bacteremia: Rocephin 2g daily through 1/17/20. ID and Urology following.  · Rhabdomyolysis: Mild. Resolved  · Left Renal Lesion: Cystic appearance on CT. Urology following.  · Urinary Retention: Voiding currently.  · Weakness  · DM: Continue insulin  · Disposition: SNF/pending placement.    Eric Unger MD  [facility]  01/13/20  4:11 PM    Dictated  portions using Dragon dictation software.

## 2020-01-13 NOTE — NURSING NOTE
CCP to follow up with family to make a decision on placement/rehab. After facility decided, CCP to determine if facility can accept peripheral IV vs midline vs PICC for IV antibiotics through 1/17/20. Awaiting information, will follow up/monitor.

## 2020-01-14 LAB
BACTERIA SPEC AEROBE CULT: NORMAL
BACTERIA SPEC AEROBE CULT: NORMAL
GLUCOSE BLDC GLUCOMTR-MCNC: 149 MG/DL (ref 70–130)
GLUCOSE BLDC GLUCOMTR-MCNC: 157 MG/DL (ref 70–130)
GLUCOSE BLDC GLUCOMTR-MCNC: 183 MG/DL (ref 70–130)
GLUCOSE BLDC GLUCOMTR-MCNC: 94 MG/DL (ref 70–130)

## 2020-01-14 PROCEDURE — 97530 THERAPEUTIC ACTIVITIES: CPT

## 2020-01-14 PROCEDURE — 82962 GLUCOSE BLOOD TEST: CPT

## 2020-01-14 PROCEDURE — 25010000003 CEFTRIAXONE PER 250 MG: Performed by: INTERNAL MEDICINE

## 2020-01-14 PROCEDURE — 63710000001 INSULIN LISPRO (HUMAN) PER 5 UNITS: Performed by: INTERNAL MEDICINE

## 2020-01-14 RX ADMIN — BRIMONIDINE TARTRATE 1 DROP: 2 SOLUTION OPHTHALMIC at 20:59

## 2020-01-14 RX ADMIN — BRIMONIDINE TARTRATE 1 DROP: 2 SOLUTION OPHTHALMIC at 09:53

## 2020-01-14 RX ADMIN — INSULIN LISPRO 2 UNITS: 100 INJECTION, SOLUTION INTRAVENOUS; SUBCUTANEOUS at 20:59

## 2020-01-14 RX ADMIN — SODIUM CHLORIDE, PRESERVATIVE FREE 10 ML: 5 INJECTION INTRAVENOUS at 21:00

## 2020-01-14 RX ADMIN — CEFTRIAXONE SODIUM 2 G: 2 INJECTION, SOLUTION INTRAVENOUS at 12:16

## 2020-01-14 RX ADMIN — PETROLATUM 100 APPLICATION: 420 OINTMENT TOPICAL at 09:53

## 2020-01-14 RX ADMIN — INSULIN LISPRO 2 UNITS: 100 INJECTION, SOLUTION INTRAVENOUS; SUBCUTANEOUS at 12:16

## 2020-01-14 RX ADMIN — PETROLATUM 100 APPLICATION: 420 OINTMENT TOPICAL at 20:59

## 2020-01-14 RX ADMIN — POLYETHYLENE GLYCOL 3350 17 G: 17 POWDER, FOR SOLUTION ORAL at 06:25

## 2020-01-14 RX ADMIN — LATANOPROST 1 DROP: 50 SOLUTION OPHTHALMIC at 21:00

## 2020-01-14 RX ADMIN — ASPIRIN 81 MG: 81 TABLET, COATED ORAL at 09:53

## 2020-01-14 RX ADMIN — SODIUM CHLORIDE, PRESERVATIVE FREE 10 ML: 5 INJECTION INTRAVENOUS at 09:54

## 2020-01-14 RX ADMIN — TAMSULOSIN HYDROCHLORIDE 0.4 MG: 0.4 CAPSULE ORAL at 09:53

## 2020-01-14 RX ADMIN — TAMSULOSIN HYDROCHLORIDE 0.4 MG: 0.4 CAPSULE ORAL at 20:59

## 2020-01-14 NOTE — PROGRESS NOTES
Continued Stay Note  [facility]     Patient Name: Raymon Solis  MRN: [id number]  Today's Date: 1/14/2020    Admit Date: 1/8/2020    Discharge Plan     Row Name 01/14/20 1548       Plan    Plan  SNF - awaiting family choice and will need [employer] pre-cert    Patient/Family in Agreement with Plan  yes    Plan Comments  Spoke with daughter Layla by telephone and at bedside.  Explained that [facility] and [facility] are not in network with patient's insurance.  Explained that [facility], [facility], [facility] would be in network.  Daughter is looking at medicare.gov for ratings.  Explained that CCP needs a decision because patient will require pre-cert.  Also received call from [facility] will not accept patient's insurance.  Left a message for daughter that [facility] is in network - awaiting return call from daughter.  Packet started and in CCP office.  CCP continues to follow.  BH[place]uk RN Becky S. [place], RN

## 2020-01-14 NOTE — PLAN OF CARE
Problem: Patient Care Overview  Goal: Plan of Care Review  Outcome: Ongoing (interventions implemented as appropriate)  Flowsheets  Taken 1/14/2020 0451  Progress: no change  Taken 1/14/2020 0250  Plan of Care Reviewed With: patient  Note:   No changes over night. Still having incontinent voids and voids well. No more IVF. Continuing IV abx. Awaiting CCP to determine with family on a SNF for pt to discharge to. Turned q2 hrs and skin care provided. VSS. No c/o or s/s of pain. Pt remained alert and oriented with flat affect. Myralax to be given this morning as ordered prn to help facilitate a BM. No other issues. Continuing to monitor.

## 2020-01-14 NOTE — PLAN OF CARE
Problem: Patient Care Overview  Goal: Plan of Care Review  Outcome: Ongoing (interventions implemented as appropriate)  Flowsheets  Taken 1/14/2020 1728  Progress: improving  Taken 1/14/2020 1431  Plan of Care Reviewed With: patient  Note:   Patient still weak but better than last week.  Did not sit in chair long.  Still sleeps a lot between care.  Q2 turns.  IV antibiotics.  Hopeful DC to rehab tomorrow. Room air.  VSS.  Appetite much improved.

## 2020-01-14 NOTE — THERAPY TREATMENT NOTE
Patient Name: Raymon Solis  : 1940    MRN: [id number]                              Today's Date: 2020       Admit Date: 2020    Visit Dx:     ICD-10-CM ICD-9-CM   1. Sepsis without acute organ dysfunction, due to unspecified organism (CMS/[facility]) A41.9 038.9     995.91   2. Pneumonia of left lower lobe due to infectious organism (CMS/[facility]) J18.1 486   3. Atrial fibrillation, unspecified type (CMS/[facility]) I48.91 427.31     Patient Active Problem List   Diagnosis   • Acute pyelonephritis   • HTN (hypertension)   • Type 2 diabetes mellitus, without long-term current use of insulin (CMS/[facility])   • Hypokalemia   • Elevated alkaline phosphatase level   • Aortic stenosis   • Acute UTI   • Wandering atrial pacemaker   • Elevated troponin   • Sepsis without acute organ dysfunction (CMS/[facility])   • History of noncompliance with medical treatment   • E. coli UTI (urinary tract infection)   • Bacteremia due to Escherichia coli   • Acute urinary retention   • Left lower lobe pulmonary infiltrate   • Non-traumatic rhabdomyolysis   • Lesion of left native kidney   • Metabolic encephalopathy     Past Medical History:   Diagnosis Date   • Aortic stenosis    • Diabetes mellitus (CMS/[facility])    • Hypertension      Past Surgical History:   Procedure Laterality Date   • CATARACT EXTRACTION, BILATERAL       General Information     Row Name 20 1137          PT Evaluation Time/Intention    Document Type  therapy note (daily note)  -AE     Mode of Treatment  physical therapy  -AE     Row Name 20 1137          General Information    Patient Profile Reviewed?  yes  -AE       User Key  (r) = Recorded By, (t) = Taken By, (c) = Cosigned By    Initials Name Provider Type    AE Leilani Gomez, PT Physical Therapist        Mobility     Row Name 20 1137          Bed Mobility Assessment/Treatment    Supine-Sit [place] (Bed Mobility)  minimum assist (75% patient effort);1 person assist  -AE     Row Name 20 1137           Transfer Assessment/Treatment    Comment (Transfers)  pt is CGA for sit<>stand, Demetra x 2 for stand pivot transfer  -AE     Row Name 01/14/20 1137          Bed-Chair Transfer    Bed-Chair [place] (Transfers)  minimum assist (75% patient effort);2 person assist  -AE     Assistive Device (Bed-Chair Transfers)  walker, front-wheeled  -AE     Row Name 01/14/20 1137          Sit-Stand Transfer    Sit-Stand [place] (Transfers)  contact guard  -AE     Assistive Device (Sit-Stand Transfers)  walker, front-wheeled  -AE     Row Name 01/14/20 1137          Gait/Stairs Assessment/Training    Distance in Feet (Gait)  pt unsafe with 4-5 steps from bed->chair; physical assist and cues needed to maintain close proximity with FWW  -AE     Pattern (Gait)  step-to  -AE       User Key  (r) = Recorded By, (t) = Taken By, (c) = Cosigned By    Initials Name Provider Type    AE Leilani Gomez PT Physical Therapist        Obj/Interventions    No documentation.       Goals/Plan    No documentation.       Clinical Impression     Row Name 01/14/20 1143          Pain Assessment    Additional Documentation  Pain Scale: Numbers Pre/Post-Treatment (Group)  -AE     Row Name 01/14/20 1143          Pain Scale: Numbers Pre/Post-Treatment    Pain Scale: Numbers, Pretreatment  0/10 - no pain  -AE     Pain Scale: Numbers, Post-Treatment  0/10 - no pain  -AE     Row Name 01/14/20 1143          Positioning and Restraints    Pre-Treatment Position  in bed  -AE     Post Treatment Position  chair  -AE     In Chair  sitting;call light within reach;encouraged to call for assist;exit alarm on  -AE       User Key  (r) = Recorded By, (t) = Taken By, (c) = Cosigned By    Initials Name Provider Type    Leilani Otto PT Physical Therapist        Outcome Measures     Row Name 01/14/20 1144          How much help from another person do you currently need...    Turning from your back to your side while in flat bed without using bedrails?  2   -AE     Moving from lying on back to sitting on the side of a flat bed without bedrails?  2  -AE     Moving to and from a bed to a chair (including a wheelchair)?  2  -AE     Standing up from a chair using your arms (e.g., wheelchair, bedside chair)?  3  -AE     Climbing 3-5 steps with a railing?  1  -AE     To walk in hospital room?  2  -AE     AM-PAC 6 Clicks Score (PT)  12  -AE     Row Name 01/14/20 1144          Functional Assessment    Outcome Measure Options  AM-PAC 6 Clicks Basic Mobility (PT)  -AE       User Key  (r) = Recorded By, (t) = Taken By, (c) = Cosigned By    Initials Name Provider Type    AE Leilani Gomez PT Physical Therapist          PT Recommendation and Plan           Time Calculation:   PT Charges     Row Name 01/14/20 1146             Time Calculation    Start Time  1130  -AE      Stop Time  1145  -AE      Time Calculation (min)  15 min  -AE      PT Received On  01/14/20  -AE      PT - Next Appointment  01/15/20  -AE         Time Calculation- PT    Total Timed Code Minutes- PT  15 minute(s)  -AE        User Key  (r) = Recorded By, (t) = Taken By, (c) = Cosigned By    Initials Name Provider Type    AE Leilani Gomez PT Physical Therapist        Therapy Charges for Today     Code Description Service Date Service Provider Modifiers Qty    88691819386 HC PT THER SUPP EA 15 MIN 1/14/2020 Leilani Gomez, PT GP 1    82789618694 HC PT THERAPEUTIC ACT EA 15 MIN 1/14/2020 Leilani Gomez PT GP 1          PT G-Codes  Outcome Measure Options: AM-PAC 6 Clicks Basic Mobility (PT)  AM-PAC 6 Clicks Score (PT): 12  AM-PAC 6 Clicks Score (OT): 9    Leilani Gomez PT  1/14/2020

## 2020-01-14 NOTE — PLAN OF CARE
Problem: Patient Care Overview  Goal: Plan of Care Review  Outcome: Ongoing (interventions implemented as appropriate)  Note:   Pt very unmotivated today. Required education and encouragement on roles of therapy in hospital and carry over to hopeful SNU placement, pt finally agreeable. However patient appears to do a task to simply get it done rather than trying to increase functional independence or safety with mobility. With cueing, pt is CGA for rolling and Demetra for supine->sit. Pt is CGA for initial sit<>stand transfer with FWW and Demetra x 2 for stand pivot transfer to chair requiring therapist assistance to maintain close proximity to walker. Pt is unsafe and attempts to sit before safe to do so. DC recs include SNU.

## 2020-01-14 NOTE — PROGRESS NOTES
Name: Raymon Solis ADMIT: 2020   : 1940  PCP: Magali Burgess MD    MRN: [id number] LOS: 6 days   AGE/SEX: 79 y.o. male  ROOM: [facility]   Subjective   Chief Complaint   Patient presents with   • Weakness - Generalized      Resting comfortably. Answering questions appropriately. Hungry. Voiding well with incontinence.  Awaiting placement for discharge. Denies CP SOA NVD and abdominal pain.    Objective   Vital Signs  Temp:  [97.6 °F (36.4 °C)-98.1 °F (36.7 °C)] 97.9 °F (36.6 °C)  Heart Rate:  [62-65] 62  Resp:  [16-18] 18  BP: (124-166)/(65-90) 166/90  SpO2:  [96 %-98 %] 98 %  on   ;   Device (Oxygen Therapy): room air  Body mass index is 27.21 kg/m².    Physical Exam   Constitutional: He appears well-developed. No distress.   HENT:   Head: Atraumatic.   Nose: Nose normal.   Eyes: Conjunctivae and EOM are normal.   Neck: Neck supple. No tracheal deviation present.   Cardiovascular: Normal rate and regular rhythm.   Murmur heard.  Pulmonary/Chest: Effort normal. He has no wheezes.   Abdominal: Soft. He exhibits no distension.   Musculoskeletal: He exhibits no edema or tenderness.   Neurological: He is alert.   Skin: Skin is warm and dry.   Nursing note and vitals reviewed.      Results Review:       I reviewed the patient's new clinical results.      Results from last 7 days   Lab Units 01/13/20  0430 01/12/20  0432 01/11/20  0433 01/10/20  0642   WBC 10*3/mm3 8.27 8.34 7.50 10.35   HEMOGLOBIN g/dL 9.7* 10.4* 9.7* 10.8*   PLATELETS 10*3/mm3 287 264 281 290     Results from last 7 days   Lab Units 20  0433 01/10/20  0642   SODIUM mmol/L 132* 137 138 137   POTASSIUM mmol/L 4.4 4.4 4.3 4.2   CHLORIDE mmol/L 100 101 104 101   CO2 mmol/L 25.8 26.8 28.1 28.9   BUN mg/dL 14 13 18 20   CREATININE mg/dL 0.64* 0.69* 0.59* 0.64*   GLUCOSE mg/dL 119* 100* 142* 105*   Estimated Creatinine Clearance: 68.1 mL/min (A) (by C-G formula based on SCr of 0.64 mg/dL (L)).  Results from  last 7 days   Lab Units 01/13/20  0430 01/12/20  0432 01/11/20  0433 01/10/20  0642 01/09/20  1308  01/08/20  1223   CALCIUM mg/dL 9.2 9.3 9.1 9.3  --    < > 8.5*   ALBUMIN g/dL  --   --   --   --  2.50*  --  2.70*   MAGNESIUM mg/dL  --   --   --   --   --   --  1.6    < > = values in this interval not displayed.         aspirin 81 mg Oral Daily   brimonidine 1 drop Both Eyes TID   cefTRIAXone 2 g Intravenous Q24H   insulin lispro 0-9 Units Subcutaneous 4x Daily With Meals & Nightly   latanoprost 1 drop Both Eyes Nightly   Petrolatum  Topical Q12H   sodium chloride 10 mL Intravenous Q12H   tamsulosin 0.4 mg Oral BID      Diet Soft Texture; Chopped; Thin; Cardiac, Consistent Carbohydrate    Assessment/Plan      Active Hospital Problems    Diagnosis  POA   • **Bacteremia due to Escherichia coli [R78.81]  Yes   • E. coli UTI (urinary tract infection) [N39.0, B96.20]  Yes   • Acute urinary retention [R33.8]  Yes   • Left lower lobe pulmonary infiltrate [R91.8]  Yes   • Non-traumatic rhabdomyolysis [M62.82]  Yes   • Lesion of left native kidney [N28.9]  Yes   • Metabolic encephalopathy [G93.41]  Yes   • Sepsis without acute organ dysfunction (CMS/HCC) [A41.9]  Yes   • History of noncompliance with medical treatment [Z91.19]  Not Applicable   • Elevated troponin [R79.89]  Yes   • Aortic stenosis [I35.0]  Yes   • Elevated alkaline phosphatase level [R74.8]  Yes   • Type 2 diabetes mellitus, without long-term current use of insulin (CMS/HCC) [E11.9]  Yes   • HTN (hypertension) [I10]  Yes   • Hypokalemia [E87.6]  Yes      Resolved Hospital Problems   No resolved problems to display.       · R Pyelonephritis/Ecoli Bacteremia: Rocephin 2g daily through 1/17/20. ID and Urology following.  · Rhabdomyolysis: Mild. Resolved  · Left Renal Lesion: Cystic appearance on CT. Urology following.  · Urinary Retention: Voiding currently.  · Weakness  · DM: Continue insulin  · Disposition: SNF/pending placement. Urology follow up 3-4 weeks.      COMPA Junior  [facility]  01/14/20  11:58 AM    Dictated portions using Dragon dictation software.

## 2020-01-15 PROBLEM — K59.00 CONSTIPATION: Status: ACTIVE | Noted: 2020-01-15

## 2020-01-15 LAB
ANION GAP SERPL CALCULATED.3IONS-SCNC: 8.7 MMOL/L (ref 5–15)
BUN BLD-MCNC: 18 MG/DL (ref 8–23)
BUN/CREAT SERPL: 23.7 (ref 7–25)
CALCIUM SPEC-SCNC: 9.1 MG/DL (ref 8.6–10.5)
CHLORIDE SERPL-SCNC: 98 MMOL/L (ref 98–107)
CO2 SERPL-SCNC: 27.3 MMOL/L (ref 22–29)
CREAT BLD-MCNC: 0.76 MG/DL (ref 0.76–1.27)
DEPRECATED RDW RBC AUTO: 41.1 FL (ref 37–54)
ERYTHROCYTE [DISTWIDTH] IN BLOOD BY AUTOMATED COUNT: 12.8 % (ref 12.3–15.4)
GFR SERPL CREATININE-BSD FRML MDRD: 120 ML/MIN/1.73
GFR SERPL CREATININE-BSD FRML MDRD: 99 ML/MIN/1.73
GLUCOSE BLD-MCNC: 153 MG/DL (ref 65–99)
GLUCOSE BLDC GLUCOMTR-MCNC: 109 MG/DL (ref 70–130)
GLUCOSE BLDC GLUCOMTR-MCNC: 137 MG/DL (ref 70–130)
GLUCOSE BLDC GLUCOMTR-MCNC: 153 MG/DL (ref 70–130)
GLUCOSE BLDC GLUCOMTR-MCNC: 170 MG/DL (ref 70–130)
GLUCOSE BLDC GLUCOMTR-MCNC: 183 MG/DL (ref 70–130)
HCT VFR BLD AUTO: 30.8 % (ref 37.5–51)
HGB BLD-MCNC: 10.1 G/DL (ref 13–17.7)
MCH RBC QN AUTO: 29.1 PG (ref 26.6–33)
MCHC RBC AUTO-ENTMCNC: 32.8 G/DL (ref 31.5–35.7)
MCV RBC AUTO: 88.8 FL (ref 79–97)
PLATELET # BLD AUTO: 302 10*3/MM3 (ref 140–450)
PMV BLD AUTO: 9 FL (ref 6–12)
POTASSIUM BLD-SCNC: 4.3 MMOL/L (ref 3.5–5.2)
RBC # BLD AUTO: 3.47 10*6/MM3 (ref 4.14–5.8)
SODIUM BLD-SCNC: 134 MMOL/L (ref 136–145)
WBC NRBC COR # BLD: 8.25 10*3/MM3 (ref 3.4–10.8)

## 2020-01-15 PROCEDURE — 25010000003 CEFTRIAXONE PER 250 MG: Performed by: INTERNAL MEDICINE

## 2020-01-15 PROCEDURE — 97535 SELF CARE MNGMENT TRAINING: CPT

## 2020-01-15 PROCEDURE — 85027 COMPLETE CBC AUTOMATED: CPT | Performed by: NURSE PRACTITIONER

## 2020-01-15 PROCEDURE — 97110 THERAPEUTIC EXERCISES: CPT

## 2020-01-15 PROCEDURE — 82962 GLUCOSE BLOOD TEST: CPT

## 2020-01-15 PROCEDURE — 80048 BASIC METABOLIC PNL TOTAL CA: CPT | Performed by: NURSE PRACTITIONER

## 2020-01-15 PROCEDURE — 63710000001 INSULIN LISPRO (HUMAN) PER 5 UNITS: Performed by: INTERNAL MEDICINE

## 2020-01-15 RX ORDER — BISACODYL 5 MG/1
10 TABLET, DELAYED RELEASE ORAL DAILY PRN
Status: DISCONTINUED | OUTPATIENT
Start: 2020-01-15 | End: 2020-01-16 | Stop reason: HOSPADM

## 2020-01-15 RX ORDER — DOCUSATE SODIUM 100 MG/1
100 CAPSULE, LIQUID FILLED ORAL 2 TIMES DAILY
Status: DISCONTINUED | OUTPATIENT
Start: 2020-01-15 | End: 2020-01-16 | Stop reason: HOSPADM

## 2020-01-15 RX ADMIN — BRIMONIDINE TARTRATE 1 DROP: 2 SOLUTION OPHTHALMIC at 17:41

## 2020-01-15 RX ADMIN — TAMSULOSIN HYDROCHLORIDE 0.4 MG: 0.4 CAPSULE ORAL at 22:47

## 2020-01-15 RX ADMIN — PETROLATUM 100 APPLICATION: 420 OINTMENT TOPICAL at 09:42

## 2020-01-15 RX ADMIN — TAMSULOSIN HYDROCHLORIDE 0.4 MG: 0.4 CAPSULE ORAL at 09:42

## 2020-01-15 RX ADMIN — SODIUM CHLORIDE, PRESERVATIVE FREE 10 ML: 5 INJECTION INTRAVENOUS at 09:42

## 2020-01-15 RX ADMIN — BRIMONIDINE TARTRATE 1 DROP: 2 SOLUTION OPHTHALMIC at 09:42

## 2020-01-15 RX ADMIN — INSULIN LISPRO 2 UNITS: 100 INJECTION, SOLUTION INTRAVENOUS; SUBCUTANEOUS at 17:41

## 2020-01-15 RX ADMIN — CEFTRIAXONE SODIUM 2 G: 2 INJECTION, SOLUTION INTRAVENOUS at 14:29

## 2020-01-15 RX ADMIN — DOCUSATE SODIUM 100 MG: 100 CAPSULE, LIQUID FILLED ORAL at 22:48

## 2020-01-15 RX ADMIN — SODIUM CHLORIDE, PRESERVATIVE FREE 10 ML: 5 INJECTION INTRAVENOUS at 22:48

## 2020-01-15 RX ADMIN — POLYETHYLENE GLYCOL 3350 17 G: 17 POWDER, FOR SOLUTION ORAL at 22:50

## 2020-01-15 RX ADMIN — BRIMONIDINE TARTRATE 1 DROP: 2 SOLUTION OPHTHALMIC at 22:47

## 2020-01-15 RX ADMIN — POLYETHYLENE GLYCOL 3350 17 G: 17 POWDER, FOR SOLUTION ORAL at 10:11

## 2020-01-15 RX ADMIN — ASPIRIN 81 MG: 81 TABLET, COATED ORAL at 09:42

## 2020-01-15 RX ADMIN — PETROLATUM 100 APPLICATION: 420 OINTMENT TOPICAL at 23:28

## 2020-01-15 RX ADMIN — LATANOPROST 1 DROP: 50 SOLUTION OPHTHALMIC at 22:47

## 2020-01-15 NOTE — PROGRESS NOTES
Continued Stay Note  [facility]     Patient Name: Raymon Solis  MRN: [id number]  Today's Date: 1/15/2020    Admit Date: 1/8/2020    Discharge Plan     Row Name 01/15/20 1200       Plan    Plan  [facility] pend insurance precert    Plan Comments  Spoke with pt's daughter Layla by phone.  Answered several question about NHP.  Pt's daughter stated that she is agreeable for [facility] to begin insurance precert.   Call placed to Jaylene with [facility] who stated that [facility] with begin precert.  CCP will follow to finalize DCP.           Discharge Codes    No documentation.             KALA Hoskins

## 2020-01-15 NOTE — PROGRESS NOTES
Name: Raymon Solis ADMIT: 2020   : 1940  PCP: Magali Burgess MD    MRN: [id number] LOS: 7 days   AGE/SEX: 79 y.o. male  ROOM: [facility]   Subjective   Chief Complaint   Patient presents with   • Weakness - Generalized      Resting comfortably. Family has decided on discharge placement and precert is pending.  Answering questions appropriately.  No BM in several days despite bowel regimen.  Denies CP SOA NVD and abdominal pain.    Objective   Vital Signs  Temp:  [97.7 °F (36.5 °C)-98.2 °F (36.8 °C)] 97.7 °F (36.5 °C)  Heart Rate:  [56-73] 72  Resp:  [18] 18  BP: (115-149)/(62-79) 145/67  SpO2:  [96 %-97 %] 97 %  on   ;   Device (Oxygen Therapy): room air  Body mass index is 27.55 kg/m².    Physical Exam   Constitutional: He appears well-developed. No distress.   HENT:   Head: Atraumatic.   Nose: Nose normal.   Eyes: Conjunctivae and EOM are normal.   Neck: Neck supple. No tracheal deviation present.   Cardiovascular: Normal rate and regular rhythm.   Murmur heard.  Pulmonary/Chest: Effort normal. He has no wheezes.   Abdominal: Soft. Bowel sounds are normal. He exhibits distension (mild ). There is no tenderness. There is no guarding.   Musculoskeletal: Normal range of motion. He exhibits no edema or tenderness.   Neurological: He is alert.   Skin: Skin is warm and dry.   Nursing note and vitals reviewed.      Results Review:       I reviewed the patient's new clinical results.      Results from last 7 days   Lab Units 20  0430 20  0432 20  0433 01/10/20  0642   WBC 10*3/mm3 8.27 8.34 7.50 10.35   HEMOGLOBIN g/dL 9.7* 10.4* 9.7* 10.8*   PLATELETS 10*3/mm3 287 264 281 290     Results from last 7 days   Lab Units 20  0430 20  0432 20  0433 01/10/20  0642   SODIUM mmol/L 132* 137 138 137   POTASSIUM mmol/L 4.4 4.4 4.3 4.2   CHLORIDE mmol/L 100 101 104 101   CO2 mmol/L 25.8 26.8 28.1 28.9   BUN mg/dL 14 13 18 20   CREATININE mg/dL 0.64* 0.69* 0.59* 0.64*   GLUCOSE mg/dL  119* 100* 142* 105*   Estimated Creatinine Clearance: 68.4 mL/min (A) (by C-G formula based on SCr of 0.64 mg/dL (L)).  Results from last 7 days   Lab Units 01/13/20  0430 01/12/20  0432 01/11/20  0433 01/10/20  0642 01/09/20  1308   CALCIUM mg/dL 9.2 9.3 9.1 9.3  --    ALBUMIN g/dL  --   --   --   --  2.50*         aspirin 81 mg Oral Daily   brimonidine 1 drop Both Eyes TID   cefTRIAXone 2 g Intravenous Q24H   insulin lispro 0-9 Units Subcutaneous 4x Daily With Meals & Nightly   latanoprost 1 drop Both Eyes Nightly   Petrolatum  Topical Q12H   sodium chloride 10 mL Intravenous Q12H   tamsulosin 0.4 mg Oral BID      Diet Soft Texture; Chopped; Thin; Cardiac, Consistent Carbohydrate    Assessment/Plan      Active Hospital Problems    Diagnosis  POA   • **Bacteremia due to Escherichia coli [R78.81]  Yes   • E. coli UTI (urinary tract infection) [N39.0, B96.20]  Yes   • Acute urinary retention [R33.8]  Yes   • Left lower lobe pulmonary infiltrate [R91.8]  Yes   • Non-traumatic rhabdomyolysis [M62.82]  Yes   • Lesion of left native kidney [N28.9]  Yes   • Metabolic encephalopathy [G93.41]  Yes   • Sepsis without acute organ dysfunction (CMS/HCC) [A41.9]  Yes   • History of noncompliance with medical treatment [Z91.19]  Not Applicable   • Elevated troponin [R79.89]  Yes   • Aortic stenosis [I35.0]  Yes   • Elevated alkaline phosphatase level [R74.8]  Yes   • Type 2 diabetes mellitus, without long-term current use of insulin (CMS/HCC) [E11.9]  Yes   • HTN (hypertension) [I10]  Yes   • Hypokalemia [E87.6]  Yes      Resolved Hospital Problems   No resolved problems to display.       · R Pyelonephritis/Ecoli Bacteremia: Rocephin 2g daily through 1/17/20. ID and Urology signed off.   · Rhabdomyolysis: Mild. Resolved  · Left Renal Lesion: Cystic appearance on CT. Urology following.  · Urinary Retention: Voiding currently.  · Weakness  · Constipation- increase bowel regimen. If no BM by tomorrow morning will check KUB  · Repeat  labs prior to discharge in 1-2 days   · DM: Continue insulin  · Disposition: SNF/pending placement pending precert.  Urology follow up 3-4 weeks.     COMPA Junior  [facility]  01/15/20  3:14 PM    Dictated portions using Dragon dictation software.

## 2020-01-15 NOTE — PLAN OF CARE
Problem: Patient Care Overview  Goal: Plan of Care Review  Flowsheets (Taken 1/15/2020 0949)  Plan of Care Reviewed With: patient  Outcome Summary: Pt partcipated well in OT this date. Pt refused use of  ipad but able to follow most commands using gestures for assist. Pt completed bed mobility with min A. Pt completed ADL with set up. Pt returned and positioned in bed for self feeding tasks. Pt req'd min A for set up tray and opening of packages. Pt encouraged to compelted HEP 3x daily. Will continue to follow.

## 2020-01-15 NOTE — THERAPY TREATMENT NOTE
Acute Care - Occupational Therapy Treatment Note  [facility]     Patient Name: Raymon Solis  : 1940  MRN: [id number]  Today's Date: 1/15/2020             Admit Date: 2020       ICD-10-CM ICD-9-CM   1. Sepsis without acute organ dysfunction, due to unspecified organism (CMS/[facility]) A41.9 038.9     995.91   2. Pneumonia of left lower lobe due to infectious organism (CMS/[facility]) J18.1 486   3. Atrial fibrillation, unspecified type (CMS/[facility]) I48.91 427.31     Patient Active Problem List   Diagnosis   • Acute pyelonephritis   • HTN (hypertension)   • Type 2 diabetes mellitus, without long-term current use of insulin (CMS/[facility])   • Hypokalemia   • Elevated alkaline phosphatase level   • Aortic stenosis   • Acute UTI   • Wandering atrial pacemaker   • Elevated troponin   • Sepsis without acute organ dysfunction (CMS/[facility])   • History of noncompliance with medical treatment   • E. coli UTI (urinary tract infection)   • Bacteremia due to Escherichia coli   • Acute urinary retention   • Left lower lobe pulmonary infiltrate   • Non-traumatic rhabdomyolysis   • Lesion of left native kidney   • Metabolic encephalopathy     Past Medical History:   Diagnosis Date   • Aortic stenosis    • Diabetes mellitus (CMS/[facility])    • Hypertension      Past Surgical History:   Procedure Laterality Date   • CATARACT EXTRACTION, BILATERAL         Therapy Treatment    Rehabilitation Treatment Summary     Row Name 01/15/20 0944             Treatment Time/Intention    Discipline  occupational therapist  -      Document Type  therapy note (daily note)  -      Subjective Information  no complaints  -      Mode of Treatment  occupational therapy  -      Patient Effort  good  -      Existing Precautions/Restrictions  fall  -      Treatment Considerations/Comments  -- pt refused to use  ipad   -      Recorded by [] Shaheen Manley OT 01/15/20 0949      Row Name 01/15/20 0944             Vital Signs    O2 Delivery Pre  Treatment  room air  -JH      O2 Delivery Post Treatment  room air  -      Recorded by [] Shaheen Manley OT 01/15/20 0949      Row Name 01/15/20 0944             Cognitive Assessment/Intervention- PT/OT    Orientation Status (Cognition)  oriented to;person;place  -      Follows Commands (Cognition)  follows one step commands;increased processing time needed;verbal cues/prompting required  -      Cognitive Assessment/Intervention Comment  -- responding appropriately  -      Recorded by [] Shaheen Manley OT 01/15/20 0949      Row Name 01/15/20 0944             Bed Mobility Assessment/Treatment    Bed Mobility Assessment/Treatment  supine-sit;sit-supine  -      Supine-Sit [place] (Bed Mobility)  minimum assist (75% patient effort);verbal cues  -      Sit-Supine [place] (Bed Mobility)  verbal cues;moderate assist (50% patient effort)  -      Assistive Device (Bed Mobility)  bed rails;draw sheet;head of bed elevated  -      Recorded by [] Shaheen Manley OT 01/15/20 0949      Row Name 01/15/20 0944             Transfer Assessment/Treatment    Comment (Transfers)  -- completed bed mobility only this date   -      Recorded by [] Shaheen Manley OT 01/15/20 0949      Row Name 01/15/20 0944             ADL Assessment/Intervention    BADL Assessment/Intervention  grooming;feeding  -      Recorded by [] Shaheen Manley OT 01/15/20 0949      Row Name 01/15/20 0944             Grooming Assessment/Training    [place] Level (Grooming)  wash face, hands;set up;independent  -      Grooming Position  supine  -      Comment (Grooming)  -- completed full task with set up only. does require tray set   -      Recorded by [] Shaheen Manley OT 01/15/20 0949      Row Name 01/15/20 0944             Self-Feeding Assessment/Training    [place] Level (Feeding)  liquids to mouth;independent  -      Self-Feeding Assess/Train, Spillage Amount  minimal  -      Position (Self-Feeding)   supine  -      Comment (Feeding)  -- Pt req'd assist with opening of packages  -      Recorded by [JH] Shaheen Manley OT 01/15/20 0949      Row Name 01/15/20 0944             BADL Safety/Performance    Impairments, BADL Safety/Performance  endurance/activity tolerance  -      Cognitive Impairments, BADL Safety/Performance  awareness, need for assistance  -JH      Recorded by [JH] Shaheen Manley OT 01/15/20 0949      Row Name 01/15/20 0944             Static Sitting Balance    Level of [place] (Unsupported Sitting, Static Balance)  supervision  -JH      Sitting Position (Unsupported Sitting, Static Balance)  sitting on edge of bed  -      Time Able to Maintain Position (Unsupported Sitting, Static Balance)  3 to 4 minutes  -      Recorded by [] Shaheen Manley OT 01/15/20 0949      Row Name 01/15/20 0944             Positioning and Restraints    Pre-Treatment Position  in bed  -JH      Post Treatment Position  bed  -      In Bed  fowlers;call light within reach;encouraged to call for assist;exit alarm on  -      Recorded by [JH] Shaheen Manley OT 01/15/20 0949      Row Name 01/15/20 0944             Pain Scale: Numbers Pre/Post-Treatment    Pain Scale: Numbers, Pretreatment  0/10 - no pain  -      Pain Scale: Numbers, Post-Treatment  0/10 - no pain  -      Recorded by [JH] Shaheen Manley OT 01/15/20 0949      Row Name                Wound 01/08/20 1726 Left lateral ankle Pressure Injury    Wound - Properties Group Date first assessed: 01/08/20 [JW] Time first assessed: 1726 [JW] Present on Hospital Admission: Y [JW] Side: Left [JW] Orientation: lateral [JW] Location: ankle [JW] Primary Wound Type: Pressure inj [JW] Stage, Pressure Injury: Stage 1 [JW] Recorded by:  [JW] Tim Clements RN 01/08/20 1727    Row Name                Wound 01/09/20 1307 Left foot Pressure Injury    Wound - Properties Group Date first assessed: 01/09/20 [LC] Time first assessed: 1307 [LC] Present on Hospital  Admission: Y [LC] Side: Left [LC] Location: foot [LC] Primary Wound Type: Pressure inj [LC] Stage, Pressure Injury: Stage 1 [] Recorded by:  [] Rebecca Leavitt RN 01/09/20 1307    Row Name 01/15/20 0944             Coping    Observed Emotional State  accepting;calm;cooperative;flat  -      Verbalized Emotional State  acceptance  -      Recorded by [JH] Shaheen Manley, OT 01/15/20 0949      Row Name 01/15/20 0944             Plan of Care Review    Plan of Care Reviewed With  patient  -      Progress  improving  -      Recorded by [JH] Shaheen Manley OT 01/15/20 0949      Row Name 01/15/20 0944             Outcome Summary/Treatment Plan (OT)    Anticipated Discharge Disposition (OT)  skilled nursing [facility]  -      Recorded by [] Shaheen Manley OT 01/15/20 0949        User Key  (r) = Recorded By, (t) = Taken By, (c) = Cosigned By    Initials Name Effective Dates Discipline     Rebecca Leavitt RN 06/16/16 -  Nurse    Tim Chung RN 07/06/16 -  Nurse    Shaheen Owen OT 09/04/19 -  OT        Wound 01/08/20 1726 Left lateral ankle Pressure Injury (Active)   Dressing Appearance open to air 1/14/2020 11:50 PM   Closure Open to air 1/14/2020 11:50 PM   Base red 1/14/2020 11:50 PM   Periwound redness 1/14/2020  2:31 PM   Care, Wound cleansed with;soap and water 1/14/2020  9:58 AM       Wound 01/09/20 1307 Left foot Pressure Injury (Active)   Dressing Appearance open to air 1/14/2020 11:50 PM   Closure Open to air 1/14/2020 11:50 PM   Base red 1/14/2020 11:50 PM           OT Recommendation and Plan  Outcome Summary/Treatment Plan (OT)  Anticipated Discharge Disposition (OT): skilled nursing facility  Therapy Frequency (OT Eval): 5 times/wk  Plan of Care Review  Plan of Care Reviewed With: patient  Plan of Care Reviewed With: patient  Outcome Summary: Pt partcipated well in OT this date. Pt refused use of  ipad but able to follow most commands using gestures for assist. Pt  completed bed mobility with min A. Pt completed ADL with set up. Pt returned and positioned in bed for self feeding tasks. Pt req'd min A for set up tray and opening of packages. Pt encouraged to compelted HEP 3x daily. Will continue to follow.  Outcome Measures     Row Name 01/15/20 0900             How much help from another is currently needed...    Putting on and taking off regular lower body clothing?  2  -JH      Bathing (including washing, rinsing, and drying)  2  -JH      Toileting (which includes using toilet bed pan or urinal)  2  -JH      Putting on and taking off regular upper body clothing  3  -JH      Taking care of personal grooming (such as brushing teeth)  3  -JH      Eating meals  3  -      AM-PAC 6 Clicks Score (OT)  15  -         Functional Assessment    Outcome Measure Options  AM-PAC 6 Clicks Daily Activity (OT)  -        User Key  (r) = Recorded By, (t) = Taken By, (c) = Cosigned By    Initials Name Provider Type    Shaheen Owen OT Occupational Therapist           Time Calculation:   Time Calculation- OT     Row Name 01/15/20 0952             Time Calculation- OT    OT Start Time  0820  -      OT Stop Time  0836  -      OT Time Calculation (min)  16 min  -      Total Timed Code Minutes- OT  16 minute(s)  -      OT Received On  01/15/20  -        User Key  (r) = Recorded By, (t) = Taken By, (c) = Cosigned By    Initials Name Provider Type    Shaheen Owen OT Occupational Therapist        Therapy Charges for Today     Code Description Service Date Service Provider Modifiers Qty    [id number] HC OT SELF CARE/MGMT/TRAIN EA 15 MIN 1/15/2020 Shaheen Manley OT GO 1               Shaheen Manley OT  1/15/2020

## 2020-01-15 NOTE — PROGRESS NOTES
Adult Nutrition  Assessment/PES    Patient Name:  Raymon Solis  YOB: 1940  MRN: [id number]  Admit Date:  1/8/2020    Assessment Date:  1/15/2020    Comments:  Nutrition follow up. Po intake improved. % of meals an drinking nutrition supplement.  DC planning noted. Will follow as needed.    Reason for Assessment     Row Name 01/15/20 1434          Reason for Assessment    Reason For Assessment  follow-up protocol         Nutrition/Diet History     Row Name 01/15/20 1434          Nutrition/Diet History    Typical Food/Fluid Intake  improving appeite, like the nutrition supplements     Factors Affecting Nutritional Intake  impaired cognitive status/motor control           Labs/Tests/Procedures/Meds     Row Name 01/15/20 1435          Labs/Procedures/Meds    Lab Results Reviewed  reviewed, pertinent     Lab Results Comments  glu, Cr, Na        Diagnostic Tests/Procedures    Diagnostic Test/Procedure Reviewed  reviewed, pertinent        Medications    Pertinent Medications Reviewed  reviewed, pertinent     Pertinent Medications Comments  asa, Abx, insulin         Physical Findings     Row Name 01/15/20 1437          Physical Findings    Skin  pressure injury ankle, foot PI, b=16           Nutrition Prescription Ordered     Row Name 01/15/20 1438          Nutrition Prescription PO    Current PO Diet  Soft Texture     Fluid Consistency  Thin     Supplement  Boost Glucose Control (Glucerna Shake)     Supplement Frequency  3 times a day     Common Modifiers  Consistent Carbohydrate;Cardiac         Evaluation of Received Nutrient/Fluid Intake     Row Name 01/15/20 1438          PO Evaluation    Number of Days PO Intake Evaluated  3 days     Number of Meals  5     % PO Intake  %         Problem/Interventions:    Intervention Goal     Row Name 01/15/20 1439          Intervention Goal    General  Maintain nutrition;Reduce/improve symptoms;Meet nutritional needs for age/condition;Disease  management/therapy;Improved nutrition related lab(s)     PO  Tolerate PO;Maintain intake;PO intake (%)     PO Intake %  80 %     Weight  No significant weight loss         Nutrition Intervention     Row Name 01/15/20 1439          Nutrition Intervention    RD/Tech Action  Follow Tx progress;Care plan reviewd;Encourage intake           Education/Evaluation     Row Name 01/15/20 1439          Education    Education  Will Instruct as appropriate        Monitor/Evaluation    Monitor  Per protocol;Skin status;Symptoms;I&O;Supplement intake;PO intake;Pertinent labs;Weight           Electronically signed by:  Nel Keen RD  01/15/20 2:40 PM

## 2020-01-15 NOTE — PLAN OF CARE
Problem: Patient Care Overview  Goal: Plan of Care Review  Outcome: Ongoing (interventions implemented as appropriate)  Flowsheets  Taken 1/15/2020 0315  Progress: no change  Taken 1/14/2020 2210  Plan of Care Reviewed With: patient  Note:   No new changes over night. Pt is AAOx2-3 and slept most all of the night. No c/o pain. VSS. Turned Q2 hrs. Hopeful to discharge today or next day.

## 2020-01-15 NOTE — THERAPY TREATMENT NOTE
Patient Name: Raymon Solis  : 1940    MRN: [id number]                              Today's Date: 1/15/2020       Admit Date: 2020    Visit Dx:     ICD-10-CM ICD-9-CM   1. Sepsis without acute organ dysfunction, due to unspecified organism (CMS/[facility]) A41.9 038.9     995.91   2. Pneumonia of left lower lobe due to infectious organism (CMS/[facility]) J18.1 486   3. Atrial fibrillation, unspecified type (CMS/[facility]) I48.91 427.31     Patient Active Problem List   Diagnosis   • Acute pyelonephritis   • HTN (hypertension)   • Type 2 diabetes mellitus, without long-term current use of insulin (CMS/[facility])   • Hypokalemia   • Elevated alkaline phosphatase level   • Aortic stenosis   • Acute UTI   • Wandering atrial pacemaker   • Elevated troponin   • Sepsis without acute organ dysfunction (CMS/[facility])   • History of noncompliance with medical treatment   • E. coli UTI (urinary tract infection)   • Bacteremia due to Escherichia coli   • Acute urinary retention   • Left lower lobe pulmonary infiltrate   • Non-traumatic rhabdomyolysis   • Lesion of left native kidney   • Metabolic encephalopathy     Past Medical History:   Diagnosis Date   • Aortic stenosis    • Diabetes mellitus (CMS/[facility])    • Hypertension      Past Surgical History:   Procedure Laterality Date   • CATARACT EXTRACTION, BILATERAL       General Information     Row Name 01/15/20 1022          PT Evaluation Time/Intention    Document Type  therapy note (daily note)  -     Mode of Treatment  physical therapy;individual therapy  -     Row Name 01/15/20 1022          General Information    Existing Precautions/Restrictions  fall  -     Row Name 01/15/20 1022          Cognitive Assessment/Intervention- PT/OT    Orientation Status (Cognition)  oriented to;person;place  -     Row Name 01/15/20 1022          Safety Issues, Functional Mobility    Impairments Affecting Function (Mobility)  endurance/activity tolerance  -       User Key  (r) = Recorded By, (t) =  Taken By, (c) = Cosigned By    Initials Name Provider Type     An Rosa PTA Physical Therapy Assistant        Mobility     Row Name 01/15/20 1023          Bed Mobility Assessment/Treatment    Supine-Sit [place] (Bed Mobility)  minimum assist (75% patient effort);verbal cues;nonverbal cues (demo/gesture)  -     Sit-Supine [place] (Bed Mobility)  not tested  -     Assistive Device (Bed Mobility)  bed rails;head of bed elevated  -     Comment (Bed Mobility)  pt requires a lot of encouragement and motivation.   -     Row Name 01/15/20 1023          Bed-Chair Transfer    Bed-Chair [place] (Transfers)  minimum assist (75% patient effort);2 person assist  -     Assistive Device (Bed-Chair Transfers)  walker, front-wheeled  -     Row Name 01/15/20 1023          Sit-Stand Transfer    Sit-Stand [place] (Transfers)  contact guard  -     Assistive Device (Sit-Stand Transfers)  walker, front-wheeled  -     Row Name 01/15/20 1023          Gait/Stairs Assessment/Training    [place] Level (Gait)  minimum assist (75% patient effort);2 person assist  -     Assistive Device (Gait)  walker, front-wheeled  -     Distance in Feet (Gait)  pt took 4-5 steps from bed to chair. pt unsafe with rwx. Pt gets walker too far out in front.   -     Deviations/Abnormal Patterns (Gait)  reese decreased;festinating/shuffling  -     Bilateral Gait Deviations  heel strike decreased;forward flexed posture  -       User Key  (r) = Recorded By, (t) = Taken By, (c) = Cosigned By    Initials Name Provider Type     An Rosa PTA Physical Therapy Assistant        Obj/Interventions     Row Name 01/15/20 1027          Therapeutic Exercise    Lower Extremity (Therapeutic Exercise)  LAQ (long arc quad), bilateral  -     Lower Extremity Range of Motion (Therapeutic Exercise)  ankle dorsiflexion/plantar flexion, bilateral  -     Exercise Type (Therapeutic Exercise)  AROM (active range of motion)   -     Position (Therapeutic Exercise)  seated  -     Sets/Reps (Therapeutic Exercise)  X5-10  -       User Key  (r) = Recorded By, (t) = Taken By, (c) = Cosigned By    Initials Name Provider Type     An Rosa PTA Physical Therapy Assistant        Goals/Plan    No documentation.       Clinical Impression     Row Name 01/15/20 1028          Pain Scale: Numbers Pre/Post-Treatment    Pain Scale: Numbers, Pretreatment  0/10 - no pain  -     Row Name 01/15/20 1028          Plan of Care Review    Plan of Care Reviewed With  patient  -     Progress  improving  -     Outcome Summary  Pt tolerated treatment with c/o fatigue. Pt requires a lot of encouragement and motivation to participate with PT. Pt is Ronni for bed mobility and STS transfers. Pt only took 4-5 steps with rwx, MinAX2. Pt required cues to keep walker close as pt gets walker too far out in front. Pt declined any further activity despite encouragement and education.  Will continue to progress pt as able.   -     Row Name 01/15/20 1028          Positioning and Restraints    Pre-Treatment Position  in bed  -EH     Post Treatment Position  chair  -     In Chair  reclined;call light within reach;encouraged to call for assist;exit alarm on;notified nsg  -       User Key  (r) = Recorded By, (t) = Taken By, (c) = Cosigned By    Initials Name Provider Type     An Rosa PTA Physical Therapy Assistant        Outcome Measures     Row Name 01/15/20 1030          How much help from another person do you currently need...    Turning from your back to your side while in flat bed without using bedrails?  3  -EH     Moving from lying on back to sitting on the side of a flat bed without bedrails?  3  -EH     Moving to and from a bed to a chair (including a wheelchair)?  2  -EH     Standing up from a chair using your arms (e.g., wheelchair, bedside chair)?  3  -EH     Climbing 3-5 steps with a railing?  1  -EH     To walk in hospital room?  2  -EH      AM-PAC 6 Clicks Score (PT)  14  -       User Key  (r) = Recorded By, (t) = Taken By, (c) = Cosigned By    Initials Name Provider Type    An Tavarez PTA Physical Therapy Assistant          PT Recommendation and Plan     Plan of Care Reviewed With: patient  Progress: improving  Outcome Summary: Pt tolerated treatment with c/o fatigue. Pt requires a lot of encouragement and motivation to participate with PT. Pt is Ronni for bed mobility and STS transfers. Pt only took 4-5 steps with rwx, MinAX2. Pt required cues to keep walker close as pt gets walker too far out in front. Pt declined any further activity despite encouragement and education.  Will continue to progress pt as able.      Time Calculation:   PT Charges     Row Name 01/15/20 1022             Time Calculation    Start Time  0942  -      Stop Time  0958  -      Time Calculation (min)  16 min  -      PT Received On  01/15/20  -      PT - Next Appointment  01/16/20  -         Time Calculation- PT    Total Timed Code Minutes- PT  16 minute(s)  -        User Key  (r) = Recorded By, (t) = Taken By, (c) = Cosigned By    Initials Name Provider Type    An Tavarez PTA Physical Therapy Assistant        Therapy Charges for Today     Code Description Service Date Service Provider Modifiers Qty    55257163151 HC PT THER PROC EA 15 MIN 1/15/2020 An Rosa PTA GP 1    07508849212 HC PT THER SUPP EA 15 MIN 1/15/2020 An Rosa PTA GP 1          PT G-Codes  Outcome Measure Options: AM-PAC 6 Clicks Daily Activity (OT)  AM-PAC 6 Clicks Score (PT): 14  AM-PAC 6 Clicks Score (OT): 15    An Rosa PTA  1/15/2020

## 2020-01-15 NOTE — PLAN OF CARE
Problem: Patient Care Overview  Goal: Plan of Care Review  1/15/2020 1341 by Manda Santos, RN  Flowsheets  Taken 1/15/2020 1341 by Manda Santos, RN  Progress: improving  Outcome Summary: Patient needs a lot of motivation to participate in care especially PT efforts.  He is flat, but copperative.  He sleeps between care.  He is stable on room air in NS[facility].  Appetite is much improved and he finishes his Glucerna supplements.  He is almost finished with his IV antibiotics.  Hopefully he will be DC'ed tomorrow to rehab where he can regain his strength and mobility.  Taken 1/15/2020 1028 by An Rosa PTA  Plan of Care Reviewed With: patient

## 2020-01-15 NOTE — PLAN OF CARE
Problem: Patient Care Overview  Goal: Plan of Care Review  Outcome: Ongoing (interventions implemented as appropriate)  Flowsheets (Taken 1/15/2020 1028)  Progress: improving  Plan of Care Reviewed With: patient  Outcome Summary: Pt tolerated treatment with c/o fatigue. Pt requires a lot of encouragement and motivation to participate with PT. Pt is Ronni for bed mobility and STS transfers. Pt only took 4-5 steps with rwx, MinAX2. Pt required cues to keep walker close as pt gets walker too far out in front. Pt declined any further activity despite encouragement and education.  Will continue to progress pt as able.

## 2020-01-16 ENCOUNTER — APPOINTMENT (OUTPATIENT)
Dept: GENERAL RADIOLOGY | Facility: HOSPITAL | Age: 80
End: 2020-01-16

## 2020-01-16 VITALS
SYSTOLIC BLOOD PRESSURE: 150 MMHG | BODY MASS INDEX: 27.63 KG/M2 | HEIGHT: 64 IN | RESPIRATION RATE: 18 BRPM | HEART RATE: 73 BPM | WEIGHT: 161.82 LBS | DIASTOLIC BLOOD PRESSURE: 68 MMHG | TEMPERATURE: 98.3 F | OXYGEN SATURATION: 97 %

## 2020-01-16 PROBLEM — R33.8 ACUTE URINARY RETENTION: Status: RESOLVED | Noted: 2020-01-09 | Resolved: 2020-01-16

## 2020-01-16 PROBLEM — G93.41 METABOLIC ENCEPHALOPATHY: Status: RESOLVED | Noted: 2020-01-09 | Resolved: 2020-01-16

## 2020-01-16 PROBLEM — E87.6 HYPOKALEMIA: Status: RESOLVED | Noted: 2019-05-13 | Resolved: 2020-01-16

## 2020-01-16 PROBLEM — B96.20 E. COLI UTI (URINARY TRACT INFECTION): Status: RESOLVED | Noted: 2020-01-09 | Resolved: 2020-01-16

## 2020-01-16 PROBLEM — N39.0 E. COLI UTI (URINARY TRACT INFECTION): Status: RESOLVED | Noted: 2020-01-09 | Resolved: 2020-01-16

## 2020-01-16 PROBLEM — A41.9 SEPSIS WITHOUT ACUTE ORGAN DYSFUNCTION (HCC): Status: RESOLVED | Noted: 2020-01-08 | Resolved: 2020-01-16

## 2020-01-16 LAB
GLUCOSE BLDC GLUCOMTR-MCNC: 115 MG/DL (ref 70–130)
GLUCOSE BLDC GLUCOMTR-MCNC: 124 MG/DL (ref 70–130)
GLUCOSE BLDC GLUCOMTR-MCNC: 197 MG/DL (ref 70–130)

## 2020-01-16 PROCEDURE — 82962 GLUCOSE BLOOD TEST: CPT

## 2020-01-16 PROCEDURE — 74018 RADEX ABDOMEN 1 VIEW: CPT

## 2020-01-16 PROCEDURE — 63710000001 INSULIN LISPRO (HUMAN) PER 5 UNITS: Performed by: INTERNAL MEDICINE

## 2020-01-16 PROCEDURE — 25010000003 CEFTRIAXONE PER 250 MG: Performed by: INTERNAL MEDICINE

## 2020-01-16 RX ORDER — BISACODYL 5 MG/1
10 TABLET, DELAYED RELEASE ORAL DAILY PRN
Qty: 30 TABLET | Refills: 0 | Status: SHIPPED | OUTPATIENT
Start: 2020-01-16

## 2020-01-16 RX ORDER — TAMSULOSIN HYDROCHLORIDE 0.4 MG/1
0.4 CAPSULE ORAL NIGHTLY
Qty: 30 CAPSULE | Refills: 0 | Status: SHIPPED | OUTPATIENT
Start: 2020-01-16

## 2020-01-16 RX ORDER — BISACODYL 5 MG/1
10 TABLET, DELAYED RELEASE ORAL DAILY PRN
Status: DISCONTINUED | OUTPATIENT
Start: 2020-01-16 | End: 2020-01-16 | Stop reason: HOSPADM

## 2020-01-16 RX ORDER — PETROLATUM 420 MG/G
OINTMENT TOPICAL EVERY 12 HOURS SCHEDULED
Qty: 70 G | Refills: 0 | Status: ON HOLD | OUTPATIENT
Start: 2020-01-16 | End: 2020-01-19

## 2020-01-16 RX ORDER — PSEUDOEPHEDRINE HCL 30 MG
100 TABLET ORAL 2 TIMES DAILY
Qty: 30 EACH | Refills: 0 | Status: SHIPPED | OUTPATIENT
Start: 2020-01-16 | End: 2020-01-24 | Stop reason: HOSPADM

## 2020-01-16 RX ORDER — BISACODYL 10 MG
10 SUPPOSITORY, RECTAL RECTAL DAILY
Status: DISCONTINUED | OUTPATIENT
Start: 2020-01-16 | End: 2020-01-16 | Stop reason: HOSPADM

## 2020-01-16 RX ORDER — CEFTRIAXONE SODIUM 2 G/50ML
2 INJECTION, SOLUTION INTRAVENOUS EVERY 24 HOURS
Qty: 100 ML | Refills: 0 | Status: SHIPPED | OUTPATIENT
Start: 2020-01-16 | End: 2020-01-16

## 2020-01-16 RX ORDER — ACETAMINOPHEN 325 MG/1
650 TABLET ORAL EVERY 4 HOURS PRN
Qty: 30 TABLET | Refills: 0 | Status: SHIPPED | OUTPATIENT
Start: 2020-01-16

## 2020-01-16 RX ORDER — NICOTINE POLACRILEX 4 MG
15 LOZENGE BUCCAL
Qty: 37.5 G | Refills: 0 | Status: SHIPPED | OUTPATIENT
Start: 2020-01-16

## 2020-01-16 RX ORDER — CEFTRIAXONE SODIUM 2 G/50ML
2 INJECTION, SOLUTION INTRAVENOUS EVERY 24 HOURS
Qty: 100 ML | Refills: 0 | Status: CANCELLED | OUTPATIENT
Start: 2020-01-16 | End: 2020-01-18

## 2020-01-16 RX ORDER — ASPIRIN 81 MG/1
81 TABLET ORAL DAILY
Qty: 30 TABLET | Refills: 0 | Status: SHIPPED | OUTPATIENT
Start: 2020-01-17

## 2020-01-16 RX ORDER — CEFTRIAXONE SODIUM 2 G/50ML
2 INJECTION, SOLUTION INTRAVENOUS EVERY 24 HOURS
Qty: 50 ML | Refills: 0 | Status: ON HOLD | OUTPATIENT
Start: 2020-01-17 | End: 2020-01-19

## 2020-01-16 RX ORDER — NYSTATIN 100000 [USP'U]/G
POWDER TOPICAL 2 TIMES DAILY
Refills: 0 | Status: ON HOLD
Start: 2020-01-16 | End: 2020-01-19

## 2020-01-16 RX ORDER — MAGNESIUM CARB/ALUMINUM HYDROX 105-160MG
296 TABLET,CHEWABLE ORAL ONCE
Status: DISCONTINUED | OUTPATIENT
Start: 2020-01-16 | End: 2020-01-16

## 2020-01-16 RX ORDER — BISACODYL 5 MG/1
10 TABLET, DELAYED RELEASE ORAL DAILY PRN
Qty: 30 TABLET | Refills: 0 | Status: CANCELLED | OUTPATIENT
Start: 2020-01-16

## 2020-01-16 RX ORDER — BISACODYL 5 MG/1
10 TABLET, DELAYED RELEASE ORAL DAILY PRN
Status: CANCELLED | OUTPATIENT
Start: 2020-01-16

## 2020-01-16 RX ORDER — POLYETHYLENE GLYCOL 3350 17 G/17G
17 POWDER, FOR SOLUTION ORAL 2 TIMES DAILY
Qty: 238 G | Refills: 0 | Status: SHIPPED | OUTPATIENT
Start: 2020-01-16

## 2020-01-16 RX ADMIN — INSULIN LISPRO 2 UNITS: 100 INJECTION, SOLUTION INTRAVENOUS; SUBCUTANEOUS at 17:22

## 2020-01-16 RX ADMIN — CEFTRIAXONE SODIUM 2 G: 2 INJECTION, SOLUTION INTRAVENOUS at 15:00

## 2020-01-16 RX ADMIN — BISACODYL 10 MG: 5 TABLET ORAL at 13:54

## 2020-01-16 RX ADMIN — TAMSULOSIN HYDROCHLORIDE 0.4 MG: 0.4 CAPSULE ORAL at 10:21

## 2020-01-16 RX ADMIN — BRIMONIDINE TARTRATE 1 DROP: 2 SOLUTION OPHTHALMIC at 10:22

## 2020-01-16 RX ADMIN — PETROLATUM 100 APPLICATION: 420 OINTMENT TOPICAL at 10:22

## 2020-01-16 RX ADMIN — ASPIRIN 81 MG: 81 TABLET, COATED ORAL at 10:21

## 2020-01-16 RX ADMIN — SODIUM CHLORIDE, PRESERVATIVE FREE 10 ML: 5 INJECTION INTRAVENOUS at 10:22

## 2020-01-16 RX ADMIN — BISACODYL 10 MG: 10 SUPPOSITORY RECTAL at 13:55

## 2020-01-16 RX ADMIN — DOCUSATE SODIUM 100 MG: 100 CAPSULE, LIQUID FILLED ORAL at 10:21

## 2020-01-16 RX ADMIN — POLYETHYLENE GLYCOL 3350 17 G: 17 POWDER, FOR SOLUTION ORAL at 10:21

## 2020-01-16 NOTE — NURSING NOTE
Patient is excoriated, spoke with Anna CHATMAN and she ordered nystatin powder.  Also, looks like antibiotic should be through 1/17/20.  He received his dose today so he will finish up the last and final dose tomorrow 1/17/20.  Per rehab facility they will accept him with a peripheral IV.

## 2020-01-16 NOTE — PLAN OF CARE
Problem: Patient Care Overview  Goal: Plan of Care Review  Outcome: Ongoing (interventions implemented as appropriate)  Flowsheets  Taken 1/16/2020 3793  Progress: no change  Outcome Summary: VSS. No c/o pain. Pt slept all night. No new changes occured. Turned Q2 hrs. Hopeful to D/C today. Continuing to monitor.  Taken 1/16/2020 3381  Plan of Care Reviewed With: patient

## 2020-01-16 NOTE — PROGRESS NOTES
Discharge Planning Assessment  [facility]     Patient Name: Raymon Solis  MRN: [id number]  Today's Date: 1/16/2020    Admit Date: 1/8/2020    Discharge Needs Assessment    No documentation.       Discharge Plan     Row Name 01/16/20 0951       Plan    Plan  [facility] has received precert and it will be good for today and tomorrow.     Plan Comments  call received from [facility] has obtained precert and will have a bed available today and tomorrow. Joanna FLETCHER        Destination      Service Provider Request Status Selected Services Address Phone Number Fax Number    [facility] Accepted N/A [address] [facility] [address] [phone]    [facility] Pending - Request Sent N/A [address] [facility] [address] [phone]    [facility] Declined N/A [address] [facility] [id number] [phone]    [facility] Declined  No MALE beds N/A [address] [facility] [address] [phone]      Durable Medical Equipment      Coordination has not been started for this encounter.      Dialysis/Infusion      Coordination has not been started for this encounter.      Home Medical Care      Coordination has not been started for this encounter.      Therapy      Coordination has not been started for this encounter.      Community Resources      Coordination has not been started for this encounter.        Expected Discharge Date and Time     Expected Discharge Date Expected Discharge Time    Jan 16, 2020         Demographic Summary    No documentation.       Functional Status    No documentation.       Psychosocial    No documentation.       Abuse/Neglect    No documentation.       Legal    No documentation.       Substance Abuse    No documentation.       Patient Forms    No documentation.           Salome Arredondo, RN

## 2020-01-16 NOTE — PROGRESS NOTES
Discharge Planning Assessment  [facility]     Patient Name: Raymon Solis  MRN: [id number]  Today's Date: 1/16/2020    Admit Date: 1/8/2020    Discharge Needs Assessment    No documentation.       Discharge Plan     Row Name 01/16/20 1654       Plan    Plan  [facility]     Final Discharge Disposition Code  06 - home with home health care    Final Note  discharged today will be going to [facility] skilled care via wheel chair transport today at 6PM. Daughter is agreeable with discharge plan. Joanna FLETCHER        Destination - Selection Complete      Service Provider Request Status Selected Services Address Phone Number Fax Number    [facility] Selected Skilled Nursing [address] [facility] [address] [phone]    [facility] Pending - Request Sent N/A [address] [facility] [address] [phone]    [facility] Declined N/A [address] [facility] [address] [phone]    [facility] Declined  No MALE beds N/A [address] [facility] [address] [phone]      Durable Medical Equipment      Coordination has not been started for this encounter.      Dialysis/Infusion      Coordination has not been started for this encounter.      Home Medical Care      Coordination has not been started for this encounter.      Therapy      Coordination has not been started for this encounter.      Community Resources      Coordination has not been started for this encounter.        Expected Discharge Date and Time     Expected Discharge Date Expected Discharge Time    Jan 16, 2020         Demographic Summary    No documentation.       Functional Status    No documentation.       Psychosocial    No documentation.       Abuse/Neglect    No documentation.       Legal    No documentation.       Substance Abuse    No documentation.       Patient Forms     No documentation.           Salome Arredondo RN

## 2020-01-16 NOTE — DISCHARGE SUMMARY
[facility]    Date of Discharge:  1/16/2020    PCP: Magali Burgess MD    Discharge Diagnosis:   Active Hospital Problems    Diagnosis  POA   • **Bacteremia due to Escherichia coli [R78.81]  Yes   • Constipation [K59.00]  Yes   • Left lower lobe pulmonary infiltrate [R91.8]  Yes   • Non-traumatic rhabdomyolysis [M62.82]  Yes   • Lesion of left native kidney [N28.9]  Yes   • History of noncompliance with medical treatment [Z91.19]  Not Applicable   • Elevated troponin [R79.89]  Yes   • Aortic stenosis [I35.0]  Yes   • Elevated alkaline phosphatase level [R74.8]  Yes   • Type 2 diabetes mellitus, without long-term current use of insulin (CMS/HCC) [E11.9]  Yes   • HTN (hypertension) [I10]  Yes      Resolved Hospital Problems    Diagnosis Date Resolved POA   • E. coli UTI (urinary tract infection) [N39.0, B96.20] 01/16/2020 Yes   • Acute urinary retention [R33.8] 01/16/2020 Yes   • Metabolic encephalopathy [G93.41] 01/16/2020 Yes   • Sepsis without acute organ dysfunction (CMS/HCC) [A41.9] 01/16/2020 Yes   • Hypokalemia [E87.6] 01/16/2020 Yes     Procedures Performed       Consults     Date and Time Order Name Status Description    1/10/2020 1545 Inpatient Urology Consult Completed     1/9/2020 0614 Inpatient Infectious Diseases Consult Completed     1/8/2020 1418 [facility] (on-call MD unless specified) Details Completed         Hospital Course  Please see history and physical for details. Patient is a 79 y.o. male admitted with confusion from home.  Initial chest imaging demonstrated left lower lobe infiltrate suggestive of possible pneumonia. He was started on Rocephin and Zithromax.  He also had an abnormal urinalysis with vague complaints of dysuria and pelvic pain.  Infectious disease was consulted for blood cultures positive for E. coli and urine was positive for GNR colonies. Antibiotics were adjusted appropriately to ceftriaxone 2 g daily and azithromycin was  stopped.  Urology was then consulted for E. coli septicemia due to urinary retention.  Urology has also been following for an abnormal CT with mildly complex renal cyst of the left kidney.  This renal mass was actually seen in June when admitted to this facility but patient never followed up with urology as instructed.  With the addition of Flomax his urinary retention has improved.  Sepsis also improved with a 10-day course of Rocephin (today is dose 9/10).  He has been afebrile with normal WBC.  Discharge is delayed while working on placement to rehab facility.  He has been constipated and KUB today shows moderate retention but no ileus or obstruction. Patient was given oral laxative and suppository with adequate bowel movement.  He will receive 1 additional dose of rocephin are at the rehab facility.  He will follow-up with urology in 3 to 4 weeks to discuss possible UroLift in attempt to stop Flomax and prevent future retention.  Patient has mild dementia and has been at his cognitive baseline.  I have reviewed discharge plans with the patient with the use of an  teleconference and discharge plans reviewed with nursing staff,  and family.  Overall he is improved and stable for discharge to [facility].        I discussed the patient's findings and my recommendations with patient, family and nursing staff.    Condition on Discharge: Improved.     Temp:  [97.8 °F (36.6 °C)-98.3 °F (36.8 °C)] 98.3 °F (36.8 °C)  Heart Rate:  [56-75] 73  Resp:  [18] 18  BP: (124-158)/(60-74) 150/68  Body mass index is 27.78 kg/m².    Physical Exam   Constitutional: He appears well-developed and well-nourished. No distress.   HENT:   Head: Normocephalic and atraumatic.   Mouth/Throat: Oropharynx is clear and moist.   Cardiovascular: Normal rate, regular rhythm, normal heart sounds and intact distal pulses.   No murmur heard.  Pulmonary/Chest: Effort normal and breath sounds normal. No respiratory  distress.   Abdominal: Soft. Bowel sounds are normal. He exhibits no distension and no mass. There is no tenderness. No hernia.   Musculoskeletal: He exhibits no edema.   Neurological: He is alert. No cranial nerve deficit.   Oriented to person and place   Skin: Skin is warm and dry.   Psychiatric: He has a normal mood and affect. His behavior is normal. Judgment and thought content normal.   Vitals reviewed.       Discharge Medications      New Medications      Instructions Start Date   acetaminophen 325 MG tablet  Commonly known as:  TYLENOL   650 mg, Oral, Every 4 Hours PRN      bisacodyl 5 MG EC tablet  Commonly known as:  DULCOLAX   10 mg, Oral, Daily PRN      cefTRIAXone 40 MG/ML IVPB  Commonly known as:  ROCEPHIN   2 g, Intravenous, Every 24 Hours      dextrose 40 % gel  Commonly known as:  GLUTOSE   15 g, Oral, Every 15 Minutes PRN      docusate sodium 100 MG capsule   100 mg, Oral, 2 Times Daily      Petrolatum 42 % ointment   Topical, Every 12 Hours Scheduled      polyethylene glycol packet  Commonly known as:  MIRALAX   17 g, Oral, 2 Times Daily         Continue These Medications      Instructions Start Date   amLODIPine 5 MG tablet  Commonly known as:  NORVASC   5 mg, Oral, Every 24 Hours Scheduled      aspirin 81 MG EC tablet   81 mg, Oral, Daily   Start Date:  January 17, 2020     brimonidine 0.2 % ophthalmic solution  Commonly known as:  ALPHAGAN   1 drop, Both Eyes, 3 Times Daily      furosemide 40 MG tablet  Commonly known as:  LASIX   20 mg, Oral, Daily      glipizide 10 MG tablet  Commonly known as:  GLUCOTROL   10 mg, Oral, 2 Times Daily Before Meals      latanoprost 0.005 % ophthalmic solution  Commonly known as:  XALATAN   1 drop, Both Eyes, Nightly      lisinopril 10 MG tablet  Commonly known as:  PRINIVIL,ZESTRIL   10 mg, Oral, Daily      metFORMIN 1000 MG tablet  Commonly known as:  GLUCOPHAGE   1,000 mg, Oral, 2 Times Daily With Meals      potassium chloride 10 MEQ CR tablet  Commonly known  as:  K-DUR   10 mEq, Oral, Daily      tamsulosin 0.4 MG capsule 24 hr capsule  Commonly known as:  FLOMAX   0.4 mg, Oral, Nightly            Diet Instructions     Diet: Cardiac, Consistent Carbohydrate, Dysphagia; Thin Liquids, No Restrictions; Mechanical Soft; Thin      Discharge Diet:   Cardiac  Consistent Carbohydrate  Dysphagia       Fluid Consistency:  Thin Liquids, No Restrictions    Pureed Options:  Mechanical Soft    Fluid Consistency:  Thin    Chopped         Activity Instructions     Activity as Tolerated           Follow-up Information     Steven Steel MD Follow up.    Specialty:  Urology  Why:  3 weeks    Contact information:  [address]  [facility] [id number] [phone]             Magali Burgess MD .    Specialty:  Internal Medicine  Contact information:  [address] [facility] [address]  [facility] [address] [phone]                 Test Results Pending at Discharge     COMPA Junior  01/16/20  2:12 PM    Discharge time spent greater than 35 minutes.

## 2020-01-16 NOTE — PLAN OF CARE
Problem: Patient Care Overview  Goal: Plan of Care Review  Outcome: Ongoing (interventions implemented as appropriate)  Flowsheets (Taken 1/16/2020 2793)  Progress: improving  Plan of Care Reviewed With: patient  Outcome Summary: Patient was sleepy this morning, but has been more active after lunch.  His appetite is improved and he is eating almost all of each meal and supplement.  He is still very weak but able to get to the bedside commode today and have a BM.  Voiding in the urinal at times but also some incontinent episodes.  VSS. Room air.  DC to rehab this evening.

## 2020-01-18 ENCOUNTER — APPOINTMENT (OUTPATIENT)
Dept: GENERAL RADIOLOGY | Facility: HOSPITAL | Age: 80
End: 2020-01-18

## 2020-01-18 ENCOUNTER — HOSPITAL ENCOUNTER (INPATIENT)
Facility: HOSPITAL | Age: 80
LOS: 5 days | Discharge: HOME HEALTH | End: 2020-01-24
Attending: EMERGENCY MEDICINE | Admitting: HOSPITALIST

## 2020-01-18 DIAGNOSIS — Z87.440 HISTORY OF UTI: ICD-10-CM

## 2020-01-18 DIAGNOSIS — R10.9 ACUTE ABDOMINAL PAIN: Primary | ICD-10-CM

## 2020-01-18 DIAGNOSIS — E16.2 HYPOGLYCEMIA: ICD-10-CM

## 2020-01-18 DIAGNOSIS — R26.89 DECREASED MOBILITY: ICD-10-CM

## 2020-01-18 DIAGNOSIS — Z87.01 HISTORY OF PNEUMONIA: ICD-10-CM

## 2020-01-18 DIAGNOSIS — K31.89 GASTRIC DISTENTION: ICD-10-CM

## 2020-01-18 DIAGNOSIS — R11.2 INTRACTABLE VOMITING WITH NAUSEA, UNSPECIFIED VOMITING TYPE: ICD-10-CM

## 2020-01-18 DIAGNOSIS — E87.20 LACTIC ACIDOSIS: ICD-10-CM

## 2020-01-18 PROCEDURE — 71045 X-RAY EXAM CHEST 1 VIEW: CPT

## 2020-01-18 PROCEDURE — 93005 ELECTROCARDIOGRAM TRACING: CPT | Performed by: EMERGENCY MEDICINE

## 2020-01-18 PROCEDURE — 80053 COMPREHEN METABOLIC PANEL: CPT | Performed by: EMERGENCY MEDICINE

## 2020-01-18 PROCEDURE — 85025 COMPLETE CBC W/AUTO DIFF WBC: CPT | Performed by: EMERGENCY MEDICINE

## 2020-01-18 PROCEDURE — 84484 ASSAY OF TROPONIN QUANT: CPT | Performed by: EMERGENCY MEDICINE

## 2020-01-18 PROCEDURE — 83605 ASSAY OF LACTIC ACID: CPT | Performed by: EMERGENCY MEDICINE

## 2020-01-18 PROCEDURE — 99284 EMERGENCY DEPT VISIT MOD MDM: CPT

## 2020-01-18 PROCEDURE — P9612 CATHETERIZE FOR URINE SPEC: HCPCS

## 2020-01-18 PROCEDURE — 93010 ELECTROCARDIOGRAM REPORT: CPT | Performed by: INTERNAL MEDICINE

## 2020-01-18 PROCEDURE — 83690 ASSAY OF LIPASE: CPT | Performed by: EMERGENCY MEDICINE

## 2020-01-18 PROCEDURE — 84145 PROCALCITONIN (PCT): CPT | Performed by: EMERGENCY MEDICINE

## 2020-01-18 RX ORDER — FENTANYL CITRATE 50 UG/ML
50 INJECTION, SOLUTION INTRAMUSCULAR; INTRAVENOUS ONCE
Status: COMPLETED | OUTPATIENT
Start: 2020-01-18 | End: 2020-01-19

## 2020-01-18 RX ORDER — ONDANSETRON 2 MG/ML
4 INJECTION INTRAMUSCULAR; INTRAVENOUS ONCE
Status: COMPLETED | OUTPATIENT
Start: 2020-01-18 | End: 2020-01-19

## 2020-01-18 RX ORDER — SODIUM CHLORIDE 0.9 % (FLUSH) 0.9 %
10 SYRINGE (ML) INJECTION AS NEEDED
Status: DISCONTINUED | OUTPATIENT
Start: 2020-01-18 | End: 2020-01-24 | Stop reason: HOSPADM

## 2020-01-18 RX ORDER — SODIUM CHLORIDE 9 MG/ML
125 INJECTION, SOLUTION INTRAVENOUS CONTINUOUS
Status: DISCONTINUED | OUTPATIENT
Start: 2020-01-18 | End: 2020-01-19

## 2020-01-19 ENCOUNTER — APPOINTMENT (OUTPATIENT)
Dept: CARDIOLOGY | Facility: HOSPITAL | Age: 80
End: 2020-01-19

## 2020-01-19 ENCOUNTER — APPOINTMENT (OUTPATIENT)
Dept: CT IMAGING | Facility: HOSPITAL | Age: 80
End: 2020-01-19

## 2020-01-19 PROBLEM — I48.92 ATRIAL FLUTTER (HCC): Status: ACTIVE | Noted: 2020-01-19

## 2020-01-19 PROBLEM — E16.2 HYPOGLYCEMIA: Status: ACTIVE | Noted: 2020-01-19

## 2020-01-19 PROBLEM — J90 PLEURAL EFFUSION: Status: ACTIVE | Noted: 2020-01-19

## 2020-01-19 PROBLEM — E87.0 HYPERNATREMIA: Status: ACTIVE | Noted: 2020-01-19

## 2020-01-19 PROBLEM — K31.89 GASTRIC DISTENTION: Status: ACTIVE | Noted: 2020-01-19

## 2020-01-19 PROBLEM — R10.9 ACUTE ABDOMINAL PAIN: Status: ACTIVE | Noted: 2020-01-19

## 2020-01-19 LAB
ALBUMIN SERPL-MCNC: 2.7 G/DL (ref 3.5–5.2)
ALBUMIN/GLOB SERPL: 0.8 G/DL
ALP SERPL-CCNC: 167 U/L (ref 39–117)
ALT SERPL W P-5'-P-CCNC: 16 U/L (ref 1–41)
ANION GAP SERPL CALCULATED.3IONS-SCNC: 10.7 MMOL/L (ref 5–15)
ANION GAP SERPL CALCULATED.3IONS-SCNC: 11.6 MMOL/L (ref 5–15)
AST SERPL-CCNC: 25 U/L (ref 1–40)
BACTERIA UR QL AUTO: ABNORMAL /HPF
BASOPHILS # BLD AUTO: 0.07 10*3/MM3 (ref 0–0.2)
BASOPHILS # BLD AUTO: 0.07 10*3/MM3 (ref 0–0.2)
BASOPHILS NFR BLD AUTO: 0.4 % (ref 0–1.5)
BASOPHILS NFR BLD AUTO: 0.5 % (ref 0–1.5)
BILIRUB SERPL-MCNC: <0.2 MG/DL (ref 0.2–1.2)
BILIRUB UR QL STRIP: NEGATIVE
BUN BLD-MCNC: 23 MG/DL (ref 8–23)
BUN BLD-MCNC: 25 MG/DL (ref 8–23)
BUN/CREAT SERPL: 25 (ref 7–25)
BUN/CREAT SERPL: 26.1 (ref 7–25)
CALCIUM SPEC-SCNC: 9.6 MG/DL (ref 8.6–10.5)
CALCIUM SPEC-SCNC: 9.6 MG/DL (ref 8.6–10.5)
CHLORIDE SERPL-SCNC: 106 MMOL/L (ref 98–107)
CHLORIDE SERPL-SCNC: 107 MMOL/L (ref 98–107)
CLARITY UR: CLEAR
CO2 SERPL-SCNC: 29.3 MMOL/L (ref 22–29)
CO2 SERPL-SCNC: 29.4 MMOL/L (ref 22–29)
COLOR UR: YELLOW
CREAT BLD-MCNC: 0.88 MG/DL (ref 0.76–1.27)
CREAT BLD-MCNC: 1 MG/DL (ref 0.76–1.27)
D-LACTATE SERPL-SCNC: 1.7 MMOL/L (ref 0.5–2)
D-LACTATE SERPL-SCNC: 2.9 MMOL/L (ref 0.5–2)
DEPRECATED RDW RBC AUTO: 41.6 FL (ref 37–54)
DEPRECATED RDW RBC AUTO: 43.4 FL (ref 37–54)
EOSINOPHIL # BLD AUTO: 0.07 10*3/MM3 (ref 0–0.4)
EOSINOPHIL # BLD AUTO: 0.1 10*3/MM3 (ref 0–0.4)
EOSINOPHIL NFR BLD AUTO: 0.4 % (ref 0.3–6.2)
EOSINOPHIL NFR BLD AUTO: 0.8 % (ref 0.3–6.2)
ERYTHROCYTE [DISTWIDTH] IN BLOOD BY AUTOMATED COUNT: 13.3 % (ref 12.3–15.4)
ERYTHROCYTE [DISTWIDTH] IN BLOOD BY AUTOMATED COUNT: 13.4 % (ref 12.3–15.4)
GFR SERPL CREATININE-BSD FRML MDRD: 101 ML/MIN/1.73
GFR SERPL CREATININE-BSD FRML MDRD: 72 ML/MIN/1.73
GFR SERPL CREATININE-BSD FRML MDRD: 84 ML/MIN/1.73
GFR SERPL CREATININE-BSD FRML MDRD: 87 ML/MIN/1.73
GLOBULIN UR ELPH-MCNC: 3.6 GM/DL
GLUCOSE BLD-MCNC: 84 MG/DL (ref 65–99)
GLUCOSE BLD-MCNC: 97 MG/DL (ref 65–99)
GLUCOSE BLDC GLUCOMTR-MCNC: 134 MG/DL (ref 70–130)
GLUCOSE BLDC GLUCOMTR-MCNC: 64 MG/DL (ref 70–130)
GLUCOSE BLDC GLUCOMTR-MCNC: 69 MG/DL (ref 70–130)
GLUCOSE BLDC GLUCOMTR-MCNC: 80 MG/DL (ref 70–130)
GLUCOSE BLDC GLUCOMTR-MCNC: 81 MG/DL (ref 70–130)
GLUCOSE BLDC GLUCOMTR-MCNC: 84 MG/DL (ref 70–130)
GLUCOSE BLDC GLUCOMTR-MCNC: 87 MG/DL (ref 70–130)
GLUCOSE BLDC GLUCOMTR-MCNC: 87 MG/DL (ref 70–130)
GLUCOSE UR STRIP-MCNC: NEGATIVE MG/DL
HCT VFR BLD AUTO: 29.8 % (ref 37.5–51)
HCT VFR BLD AUTO: 30 % (ref 37.5–51)
HGB BLD-MCNC: 10 G/DL (ref 13–17.7)
HGB BLD-MCNC: 9.9 G/DL (ref 13–17.7)
HGB UR QL STRIP.AUTO: NEGATIVE
HOLD SPECIMEN: NORMAL
HYALINE CASTS UR QL AUTO: ABNORMAL /LPF
IMM GRANULOCYTES # BLD AUTO: 0.06 10*3/MM3 (ref 0–0.05)
IMM GRANULOCYTES # BLD AUTO: 0.14 10*3/MM3 (ref 0–0.05)
IMM GRANULOCYTES NFR BLD AUTO: 0.5 % (ref 0–0.5)
IMM GRANULOCYTES NFR BLD AUTO: 0.7 % (ref 0–0.5)
KETONES UR QL STRIP: NEGATIVE
LEUKOCYTE ESTERASE UR QL STRIP.AUTO: ABNORMAL
LIPASE SERPL-CCNC: 40 U/L (ref 13–60)
LYMPHOCYTES # BLD AUTO: 1.26 10*3/MM3 (ref 0.7–3.1)
LYMPHOCYTES # BLD AUTO: 1.46 10*3/MM3 (ref 0.7–3.1)
LYMPHOCYTES NFR BLD AUTO: 11.4 % (ref 19.6–45.3)
LYMPHOCYTES NFR BLD AUTO: 6.6 % (ref 19.6–45.3)
MCH RBC QN AUTO: 29.3 PG (ref 26.6–33)
MCH RBC QN AUTO: 29.4 PG (ref 26.6–33)
MCHC RBC AUTO-ENTMCNC: 33 G/DL (ref 31.5–35.7)
MCHC RBC AUTO-ENTMCNC: 33.6 G/DL (ref 31.5–35.7)
MCV RBC AUTO: 87.6 FL (ref 79–97)
MCV RBC AUTO: 88.8 FL (ref 79–97)
MONOCYTES # BLD AUTO: 0.9 10*3/MM3 (ref 0.1–0.9)
MONOCYTES # BLD AUTO: 1.11 10*3/MM3 (ref 0.1–0.9)
MONOCYTES NFR BLD AUTO: 5.8 % (ref 5–12)
MONOCYTES NFR BLD AUTO: 7 % (ref 5–12)
NEUTROPHILS # BLD AUTO: 10.25 10*3/MM3 (ref 1.7–7)
NEUTROPHILS # BLD AUTO: 16.51 10*3/MM3 (ref 1.7–7)
NEUTROPHILS NFR BLD AUTO: 79.8 % (ref 42.7–76)
NEUTROPHILS NFR BLD AUTO: 86.1 % (ref 42.7–76)
NITRITE UR QL STRIP: NEGATIVE
NRBC BLD AUTO-RTO: 0 /100 WBC (ref 0–0.2)
NRBC BLD AUTO-RTO: 0 /100 WBC (ref 0–0.2)
PH UR STRIP.AUTO: <=5 [PH] (ref 5–8)
PLATELET # BLD AUTO: 354 10*3/MM3 (ref 140–450)
PLATELET # BLD AUTO: 364 10*3/MM3 (ref 140–450)
PMV BLD AUTO: 8.7 FL (ref 6–12)
PMV BLD AUTO: 8.9 FL (ref 6–12)
POTASSIUM BLD-SCNC: 4.4 MMOL/L (ref 3.5–5.2)
POTASSIUM BLD-SCNC: 4.5 MMOL/L (ref 3.5–5.2)
PROCALCITONIN SERPL-MCNC: 0.14 NG/ML (ref 0.1–0.25)
PROT SERPL-MCNC: 6.3 G/DL (ref 6–8.5)
PROT UR QL STRIP: NEGATIVE
RBC # BLD AUTO: 3.38 10*6/MM3 (ref 4.14–5.8)
RBC # BLD AUTO: 3.4 10*6/MM3 (ref 4.14–5.8)
RBC # UR: ABNORMAL /HPF
REF LAB TEST METHOD: ABNORMAL
SODIUM BLD-SCNC: 146 MMOL/L (ref 136–145)
SODIUM BLD-SCNC: 148 MMOL/L (ref 136–145)
SP GR UR STRIP: 1.01 (ref 1–1.03)
SQUAMOUS #/AREA URNS HPF: ABNORMAL /HPF
TROPONIN T SERPL-MCNC: 0.06 NG/ML (ref 0–0.03)
TROPONIN T SERPL-MCNC: 0.06 NG/ML (ref 0–0.03)
UROBILINOGEN UR QL STRIP: ABNORMAL
WBC NRBC COR # BLD: 12.84 10*3/MM3 (ref 3.4–10.8)
WBC NRBC COR # BLD: 19.16 10*3/MM3 (ref 3.4–10.8)
WBC UR QL AUTO: ABNORMAL /HPF

## 2020-01-19 PROCEDURE — 74177 CT ABD & PELVIS W/CONTRAST: CPT

## 2020-01-19 PROCEDURE — 25010000002 IOPAMIDOL 61 % SOLUTION: Performed by: EMERGENCY MEDICINE

## 2020-01-19 PROCEDURE — 25010000002 FENTANYL CITRATE (PF) 100 MCG/2ML SOLUTION: Performed by: EMERGENCY MEDICINE

## 2020-01-19 PROCEDURE — 25010000002 ONDANSETRON PER 1 MG: Performed by: EMERGENCY MEDICINE

## 2020-01-19 PROCEDURE — 81001 URINALYSIS AUTO W/SCOPE: CPT | Performed by: EMERGENCY MEDICINE

## 2020-01-19 PROCEDURE — 99221 1ST HOSP IP/OBS SF/LOW 40: CPT | Performed by: INTERNAL MEDICINE

## 2020-01-19 PROCEDURE — 0D9670Z DRAINAGE OF STOMACH WITH DRAINAGE DEVICE, VIA NATURAL OR ARTIFICIAL OPENING: ICD-10-PCS | Performed by: SURGERY

## 2020-01-19 PROCEDURE — 93306 TTE W/DOPPLER COMPLETE: CPT

## 2020-01-19 PROCEDURE — 84484 ASSAY OF TROPONIN QUANT: CPT | Performed by: NURSE PRACTITIONER

## 2020-01-19 PROCEDURE — 85025 COMPLETE CBC W/AUTO DIFF WBC: CPT | Performed by: NURSE PRACTITIONER

## 2020-01-19 PROCEDURE — 93010 ELECTROCARDIOGRAM REPORT: CPT | Performed by: INTERNAL MEDICINE

## 2020-01-19 PROCEDURE — 87086 URINE CULTURE/COLONY COUNT: CPT | Performed by: EMERGENCY MEDICINE

## 2020-01-19 PROCEDURE — 25010000002 ENOXAPARIN PER 10 MG: Performed by: NURSE PRACTITIONER

## 2020-01-19 PROCEDURE — 25010000002 METOCLOPRAMIDE PER 10 MG: Performed by: INTERNAL MEDICINE

## 2020-01-19 PROCEDURE — 82962 GLUCOSE BLOOD TEST: CPT

## 2020-01-19 PROCEDURE — 93005 ELECTROCARDIOGRAM TRACING: CPT | Performed by: HOSPITALIST

## 2020-01-19 PROCEDURE — 80048 BASIC METABOLIC PNL TOTAL CA: CPT | Performed by: NURSE PRACTITIONER

## 2020-01-19 PROCEDURE — 25010000002 PIPERACILLIN SOD-TAZOBACTAM PER 1 G: Performed by: NURSE PRACTITIONER

## 2020-01-19 PROCEDURE — 83605 ASSAY OF LACTIC ACID: CPT | Performed by: EMERGENCY MEDICINE

## 2020-01-19 PROCEDURE — 87040 BLOOD CULTURE FOR BACTERIA: CPT | Performed by: NURSE PRACTITIONER

## 2020-01-19 PROCEDURE — 93306 TTE W/DOPPLER COMPLETE: CPT | Performed by: INTERNAL MEDICINE

## 2020-01-19 PROCEDURE — 99221 1ST HOSP IP/OBS SF/LOW 40: CPT | Performed by: SURGERY

## 2020-01-19 RX ORDER — NICOTINE POLACRILEX 4 MG
15 LOZENGE BUCCAL
Status: DISCONTINUED | OUTPATIENT
Start: 2020-01-19 | End: 2020-01-24 | Stop reason: HOSPADM

## 2020-01-19 RX ORDER — NITROGLYCERIN 0.4 MG/1
0.4 TABLET SUBLINGUAL
Status: DISCONTINUED | OUTPATIENT
Start: 2020-01-19 | End: 2020-01-24 | Stop reason: HOSPADM

## 2020-01-19 RX ORDER — BISACODYL 5 MG/1
10 TABLET, DELAYED RELEASE ORAL DAILY PRN
Status: DISCONTINUED | OUTPATIENT
Start: 2020-01-19 | End: 2020-01-24 | Stop reason: HOSPADM

## 2020-01-19 RX ORDER — TAMSULOSIN HYDROCHLORIDE 0.4 MG/1
0.4 CAPSULE ORAL NIGHTLY
Status: DISCONTINUED | OUTPATIENT
Start: 2020-01-19 | End: 2020-01-24 | Stop reason: HOSPADM

## 2020-01-19 RX ORDER — METOCLOPRAMIDE HYDROCHLORIDE 5 MG/ML
10 INJECTION INTRAMUSCULAR; INTRAVENOUS EVERY 6 HOURS
Status: DISCONTINUED | OUTPATIENT
Start: 2020-01-19 | End: 2020-01-21

## 2020-01-19 RX ORDER — DEXTROSE MONOHYDRATE 25 G/50ML
25 INJECTION, SOLUTION INTRAVENOUS ONCE
Status: COMPLETED | OUTPATIENT
Start: 2020-01-19 | End: 2020-01-19

## 2020-01-19 RX ORDER — SODIUM CHLORIDE 0.9 % (FLUSH) 0.9 %
10 SYRINGE (ML) INJECTION AS NEEDED
Status: DISCONTINUED | OUTPATIENT
Start: 2020-01-19 | End: 2020-01-24 | Stop reason: HOSPADM

## 2020-01-19 RX ORDER — BISACODYL 10 MG
10 SUPPOSITORY, RECTAL RECTAL ONCE
Status: COMPLETED | OUTPATIENT
Start: 2020-01-19 | End: 2020-01-19

## 2020-01-19 RX ORDER — DEXTROSE MONOHYDRATE 50 MG/ML
75 INJECTION, SOLUTION INTRAVENOUS CONTINUOUS
Status: DISCONTINUED | OUTPATIENT
Start: 2020-01-19 | End: 2020-01-20

## 2020-01-19 RX ORDER — ASPIRIN 81 MG/1
81 TABLET ORAL DAILY
Status: DISCONTINUED | OUTPATIENT
Start: 2020-01-19 | End: 2020-01-24 | Stop reason: HOSPADM

## 2020-01-19 RX ORDER — ONDANSETRON 2 MG/ML
4 INJECTION INTRAMUSCULAR; INTRAVENOUS EVERY 6 HOURS PRN
Status: DISCONTINUED | OUTPATIENT
Start: 2020-01-19 | End: 2020-01-19

## 2020-01-19 RX ORDER — SODIUM CHLORIDE 0.9 % (FLUSH) 0.9 %
10 SYRINGE (ML) INJECTION EVERY 12 HOURS SCHEDULED
Status: DISCONTINUED | OUTPATIENT
Start: 2020-01-19 | End: 2020-01-24 | Stop reason: HOSPADM

## 2020-01-19 RX ORDER — WATER / MINERAL OIL / WHITE PETROLATUM 16 OZ
CREAM TOPICAL 2 TIMES DAILY
COMMUNITY

## 2020-01-19 RX ORDER — LANOLIN ALCOHOL/MO/W.PET/CERES
CREAM (GRAM) TOPICAL 2 TIMES DAILY
Status: DISCONTINUED | OUTPATIENT
Start: 2020-01-19 | End: 2020-01-24 | Stop reason: HOSPADM

## 2020-01-19 RX ORDER — BRIMONIDINE TARTRATE 2 MG/ML
1 SOLUTION/ DROPS OPHTHALMIC 3 TIMES DAILY
Status: DISCONTINUED | OUTPATIENT
Start: 2020-01-19 | End: 2020-01-24 | Stop reason: HOSPADM

## 2020-01-19 RX ORDER — ACETAMINOPHEN 160 MG/5ML
650 SOLUTION ORAL EVERY 4 HOURS PRN
Status: DISCONTINUED | OUTPATIENT
Start: 2020-01-19 | End: 2020-01-24 | Stop reason: HOSPADM

## 2020-01-19 RX ORDER — ACETAMINOPHEN 650 MG/1
650 SUPPOSITORY RECTAL EVERY 4 HOURS PRN
Status: DISCONTINUED | OUTPATIENT
Start: 2020-01-19 | End: 2020-01-24 | Stop reason: HOSPADM

## 2020-01-19 RX ORDER — ACETAMINOPHEN 325 MG/1
650 TABLET ORAL EVERY 4 HOURS PRN
Status: DISCONTINUED | OUTPATIENT
Start: 2020-01-19 | End: 2020-01-24 | Stop reason: HOSPADM

## 2020-01-19 RX ORDER — DOCUSATE SODIUM 100 MG/1
100 CAPSULE, LIQUID FILLED ORAL 2 TIMES DAILY
Status: DISCONTINUED | OUTPATIENT
Start: 2020-01-19 | End: 2020-01-24 | Stop reason: HOSPADM

## 2020-01-19 RX ORDER — PROMETHAZINE HYDROCHLORIDE 25 MG/ML
12.5 INJECTION, SOLUTION INTRAMUSCULAR; INTRAVENOUS EVERY 6 HOURS PRN
Status: DISCONTINUED | OUTPATIENT
Start: 2020-01-19 | End: 2020-01-24 | Stop reason: HOSPADM

## 2020-01-19 RX ORDER — SODIUM CHLORIDE, SODIUM LACTATE, POTASSIUM CHLORIDE, CALCIUM CHLORIDE 600; 310; 30; 20 MG/100ML; MG/100ML; MG/100ML; MG/100ML
75 INJECTION, SOLUTION INTRAVENOUS CONTINUOUS
Status: DISCONTINUED | OUTPATIENT
Start: 2020-01-19 | End: 2020-01-19

## 2020-01-19 RX ORDER — DEXTROSE AND SODIUM CHLORIDE 5; .9 G/100ML; G/100ML
125 INJECTION, SOLUTION INTRAVENOUS CONTINUOUS
Status: DISCONTINUED | OUTPATIENT
Start: 2020-01-19 | End: 2020-01-19

## 2020-01-19 RX ORDER — MORPHINE SULFATE 2 MG/ML
1 INJECTION, SOLUTION INTRAMUSCULAR; INTRAVENOUS EVERY 4 HOURS PRN
Status: DISCONTINUED | OUTPATIENT
Start: 2020-01-19 | End: 2020-01-21

## 2020-01-19 RX ORDER — HYDRALAZINE HYDROCHLORIDE 20 MG/ML
10 INJECTION INTRAMUSCULAR; INTRAVENOUS EVERY 6 HOURS PRN
Status: DISCONTINUED | OUTPATIENT
Start: 2020-01-19 | End: 2020-01-24 | Stop reason: HOSPADM

## 2020-01-19 RX ORDER — POLYETHYLENE GLYCOL 3350 17 G/17G
17 POWDER, FOR SOLUTION ORAL 2 TIMES DAILY
Status: DISCONTINUED | OUTPATIENT
Start: 2020-01-19 | End: 2020-01-24 | Stop reason: HOSPADM

## 2020-01-19 RX ORDER — DEXTROSE MONOHYDRATE 25 G/50ML
25 INJECTION, SOLUTION INTRAVENOUS
Status: DISCONTINUED | OUTPATIENT
Start: 2020-01-19 | End: 2020-01-24 | Stop reason: HOSPADM

## 2020-01-19 RX ORDER — LATANOPROST 50 UG/ML
1 SOLUTION/ DROPS OPHTHALMIC NIGHTLY
Status: DISCONTINUED | OUTPATIENT
Start: 2020-01-19 | End: 2020-01-24 | Stop reason: HOSPADM

## 2020-01-19 RX ADMIN — DEXTROSE MONOHYDRATE 75 ML/HR: 50 INJECTION, SOLUTION INTRAVENOUS at 13:45

## 2020-01-19 RX ADMIN — LATANOPROST 1 DROP: 50 SOLUTION OPHTHALMIC at 21:28

## 2020-01-19 RX ADMIN — POLYETHYLENE GLYCOL 3350 17 G: 17 POWDER, FOR SOLUTION ORAL at 20:08

## 2020-01-19 RX ADMIN — ENOXAPARIN SODIUM 40 MG: 40 INJECTION SUBCUTANEOUS at 14:21

## 2020-01-19 RX ADMIN — SODIUM CHLORIDE 125 ML/HR: 9 INJECTION, SOLUTION INTRAVENOUS at 01:19

## 2020-01-19 RX ADMIN — DEXTROSE AND SODIUM CHLORIDE 125 ML/HR: 5; 900 INJECTION, SOLUTION INTRAVENOUS at 02:30

## 2020-01-19 RX ADMIN — SODIUM CHLORIDE, POTASSIUM CHLORIDE, SODIUM LACTATE AND CALCIUM CHLORIDE 100 ML/HR: 600; 310; 30; 20 INJECTION, SOLUTION INTRAVENOUS at 06:03

## 2020-01-19 RX ADMIN — ONDANSETRON 4 MG: 2 INJECTION INTRAMUSCULAR; INTRAVENOUS at 00:11

## 2020-01-19 RX ADMIN — BISACODYL 10 MG: 10 SUPPOSITORY RECTAL at 13:17

## 2020-01-19 RX ADMIN — SODIUM CHLORIDE, PRESERVATIVE FREE 10 ML: 5 INJECTION INTRAVENOUS at 08:33

## 2020-01-19 RX ADMIN — FENTANYL CITRATE 50 MCG: 50 INJECTION, SOLUTION INTRAMUSCULAR; INTRAVENOUS at 00:11

## 2020-01-19 RX ADMIN — TAZOBACTAM SODIUM AND PIPERACILLIN SODIUM 3.38 G: 375; 3 INJECTION, SOLUTION INTRAVENOUS at 13:17

## 2020-01-19 RX ADMIN — IOPAMIDOL 85 ML: 612 INJECTION, SOLUTION INTRAVENOUS at 01:00

## 2020-01-19 RX ADMIN — DOCUSATE SODIUM 100 MG: 100 CAPSULE, LIQUID FILLED ORAL at 20:08

## 2020-01-19 RX ADMIN — METOCLOPRAMIDE 10 MG: 5 INJECTION, SOLUTION INTRAMUSCULAR; INTRAVENOUS at 22:42

## 2020-01-19 RX ADMIN — BRIMONIDINE TARTRATE 1 DROP: 2 SOLUTION OPHTHALMIC at 20:08

## 2020-01-19 RX ADMIN — METOCLOPRAMIDE 10 MG: 5 INJECTION, SOLUTION INTRAMUSCULAR; INTRAVENOUS at 17:42

## 2020-01-19 RX ADMIN — DEXTROSE MONOHYDRATE 25 G: 25 INJECTION, SOLUTION INTRAVENOUS at 02:21

## 2020-01-19 RX ADMIN — BRIMONIDINE TARTRATE 1 DROP: 2 SOLUTION OPHTHALMIC at 14:56

## 2020-01-19 RX ADMIN — Medication: at 21:29

## 2020-01-19 RX ADMIN — TAMSULOSIN HYDROCHLORIDE 0.4 MG: 0.4 CAPSULE ORAL at 20:08

## 2020-01-19 RX ADMIN — SODIUM CHLORIDE, PRESERVATIVE FREE 10 ML: 5 INJECTION INTRAVENOUS at 20:09

## 2020-01-19 RX ADMIN — SODIUM CHLORIDE 500 ML: 9 INJECTION, SOLUTION INTRAVENOUS at 00:19

## 2020-01-19 RX ADMIN — TAZOBACTAM SODIUM AND PIPERACILLIN SODIUM 3.38 G: 375; 3 INJECTION, SOLUTION INTRAVENOUS at 18:02

## 2020-01-19 RX ADMIN — ASPIRIN 81 MG: 81 TABLET, COATED ORAL at 14:56

## 2020-01-19 NOTE — ED PROVIDER NOTES
EMERGENCY DEPARTMENT ENCOUNTER    Room Number:  28/28  Date of encounter:  1/19/2020  PCP: Magali Burgess MD  Historian: patient, translation assistance by CHANCE You      HPI:  Chief Complaint: hypoglycemia, abdominal pain  A complete HPI/ROS/PMH/PSH/SH/FH are unobtainable due to: none  Context: Raymon Solis is a 79 y.o. male who presents to the ED from [facility] due to hypoglycemia first reported around 1600 today as well as abdominal pain. Per EMS, pt's BG was 23 upon arrival. EMS reports they gave D50 en route with BG improvement to 195 upon arrival in ED. Pt affirms abdominal pain that began a couple of days ago as well as generalized myalgias and weakness and frequent urination but denies difficulty breathing and cough. Pt reportedly had episode of emesis in triage. CHANCE You at bedside assisting with translation as patient does not speak English.      MEDICAL HISTORY REVIEW    Seen here 10 days ago and admitted for bacteremia due to E. Coli - discharged 2 days ago    Discharge Diagnosis: (from 01/16/20)        Active Hospital Problems     Diagnosis   POA   • **Bacteremia due to Escherichia coli [R78.81]   Yes   • Constipation [K59.00]   Yes   • Left lower lobe pulmonary infiltrate [R91.8]   Yes   • Non-traumatic rhabdomyolysis [M62.82]   Yes   • Lesion of left native kidney [N28.9]   Yes   • History of noncompliance with medical treatment [Z91.19]   Not Applicable   • Elevated troponin [R79.89]   Yes   • Aortic stenosis [I35.0]   Yes   • Elevated alkaline phosphatase level [R74.8]   Yes   • Type 2 diabetes mellitus, without long-term current use of insulin (CMS/HCC) [E11.9]   Yes   • HTN (hypertension) [I10]   Yes         PAST MEDICAL HISTORY  Active Ambulatory Problems     Diagnosis Date Noted   • Acute pyelonephritis 05/13/2019   • HTN (hypertension) 05/13/2019   • Type 2 diabetes mellitus, without long-term current use of insulin (CMS/HCC) 05/13/2019   • Elevated alkaline phosphatase level 05/14/2019   •  Aortic stenosis 05/15/2019   • Acute UTI 06/12/2019   • 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 coli UTI (urinary tract infection) 01/09/2020   • Acute urinary retention 01/09/2020   • Metabolic encephalopathy 01/09/2020     Past Medical History:   Diagnosis Date   • Diabetes mellitus (CMS/[facility])    • Hypertension          PAST SURGICAL HISTORY  Past Surgical History:   Procedure Laterality Date   • CATARACT EXTRACTION, BILATERAL           FAMILY HISTORY  No family history on file.      SOCIAL HISTORY  Social History     Socioeconomic History   • Marital status:      Spouse name: Not on file   • Number of children: Not on file   • Years of education: Not on file   • Highest education level: Not on file   Tobacco Use   • Smoking status: Never Smoker   • Smokeless tobacco: Never Used   Substance and Sexual Activity   • Alcohol use: No     Frequency: Never   • Drug use: No   • Sexual activity: Defer         ALLERGIES  Patient has no known allergies.        REVIEW OF SYSTEMS  Review of Systems   Constitutional: Negative for activity change, appetite change and fever.   HENT: Negative for congestion and sore throat.    Eyes: Negative.    Respiratory: Negative for cough and shortness of breath.    Cardiovascular: Negative for chest pain and leg swelling.   Gastrointestinal: Positive for abdominal pain. Negative for diarrhea and vomiting.   Endocrine: Negative.    Genitourinary: Positive for frequency. Negative for decreased urine volume and dysuria.    Musculoskeletal: Positive for myalgias ( generalized). Negative for neck pain.   Skin: Negative for rash and wound.   Allergic/Immunologic: Negative.    Neurological: Positive for weakness ( generalized). Negative for numbness and headaches.   Hematological: Negative.    Psychiatric/Behavioral: Negative.    All other systems reviewed and are negative.       All systems reviewed and negative except for those discussed in HPI.       PHYSICAL EXAM    I have reviewed the triage vital signs and nursing notes.    ED Triage Vitals   Temp Heart Rate Resp BP SpO2   01/18/20 2348 01/18/20 2323 01/18/20 2323 01/18/20 2323 01/18/20 2323   97.4 °F (36.3 °C) 66 20 141/72 96 %      Temp src Heart Rate Source Patient Position BP Location FiO2 (%)   01/18/20 2348 -- -- -- --   Tympanic             Physical Exam    Physical Exam   Constitutional: No distress. Not overtly toxic but generally ill-appearing  HENT:  Head: Normocephalic and atraumatic.   Oropharynx: Mucous membranes are moist.   Eyes: PERRL. EOM are normal. No scleral icterus. No conjunctival pallor.  Neck: Normal range of motion. Neck supple.   Cardiovascular: Normal rate, regular rhythm and intact distal pulses.No murmur heard.  Pulmonary/Chest: Effort normal and breath sounds normal. No respiratory distress. There are diminished breath sounds at L base, no wheezes, no rhonchi, and no rales.   Abdominal: Soft. Bowel sounds are normal. There is mild epigastric tenderness. There is no rebound and no guarding or rigidity.   Musculoskeletal: Normal range of motion. There is no pedal edema or calf tenderness.   Neurological: Alert. Normal sensation, normal strength and intact cranial nerves. No sensory deficit.   Skin: Skin is pink, warm, and dry. No rash noted. No pallor.   Psychiatric: Mood and affect normal.   Nursing note and vitals reviewed.    LAB RESULTS  Recent Results (from the past 24 hour(s))   Comprehensive Metabolic Panel    Collection Time: 01/18/20 11:50 PM    Result Value Ref Range    Glucose 97 65 - 99 mg/dL    BUN 25 (H) 8 - 23 mg/dL    Creatinine 1.00 0.76 - 1.27 mg/dL    Sodium 148 (H) 136 - 145 mmol/L    Potassium 4.5 3.5 - 5.2 mmol/L    Chloride 107 98 - 107 mmol/L    CO2 29.4 (H) 22.0 - 29.0 mmol/L    Calcium 9.6 8.6 - 10.5 mg/dL    Total Protein 6.3 6.0 - 8.5 g/dL    Albumin 2.70 (L) 3.50 - 5.20 g/dL    ALT (SGPT) 16 1 - 41 U/L    AST (SGOT) 25 1 - 40 U/L    Alkaline Phosphatase 167 (H) 39 - 117 U/L    Total Bilirubin <0.2 (L) 0.2 - 1.2 mg/dL    eGFR Non African Amer 72 >60 mL/min/1.73    eGFR  African Amer 87 >60 mL/min/1.73    Globulin 3.6 gm/dL    A/G Ratio 0.8 g/dL    BUN/Creatinine Ratio 25.0 7.0 - 25.0    Anion Gap 11.6 5.0 - 15.0 mmol/L   Lipase    Collection Time: 01/18/20 11:50 PM   Result Value Ref Range    Lipase 40 13 - 60 U/L   Troponin    Collection Time: 01/18/20 11:50 PM   Result Value Ref Range    Troponin T 0.064 (C) 0.000 - 0.030 ng/mL   CBC Auto Differential    Collection Time: 01/18/20 11:50 PM   Result Value Ref Range    WBC 12.84 (H) 3.40 - 10.80 10*3/mm3    RBC 3.38 (L) 4.14 - 5.80 10*6/mm3    Hemoglobin 9.9 (L) 13.0 - 17.7 g/dL    Hematocrit 30.0 (L) 37.5 - 51.0 %    MCV 88.8 79.0 - 97.0 fL    MCH 29.3 26.6 - 33.0 pg    MCHC 33.0 31.5 - 35.7 g/dL    RDW 13.4 12.3 - 15.4 %    RDW-SD 43.4 37.0 - 54.0 fl    MPV 8.9 6.0 - 12.0 fL    Platelets 354 140 - 450 10*3/mm3    Neutrophil % 79.8 (H) 42.7 - 76.0 %    Lymphocyte % 11.4 (L) 19.6 - 45.3 %    Monocyte % 7.0 5.0 - 12.0 %    Eosinophil % 0.8 0.3 - 6.2 %    Basophil % 0.5 0.0 - 1.5 %    Immature Grans % 0.5 0.0 - 0.5 %    Neutrophils, Absolute 10.25 (H) 1.70 - 7.00 10*3/mm3    Lymphocytes, Absolute 1.46 0.70 - 3.10 10*3/mm3    Monocytes, Absolute 0.90 0.10 - 0.90 10*3/mm3    Eosinophils, Absolute 0.10 0.00 - 0.40 10*3/mm3    Basophils, Absolute 0.07 0.00 - 0.20 10*3/mm3    Immature Grans, Absolute 0.06 (H) 0.00 - 0.05 10*3/mm3    nRBC 0.0 0.0 - 0.2 /100 WBC   Procalcitonin    Collection  Time: 01/18/20 11:50 PM   Result Value Ref Range    Procalcitonin 0.14 0.10 - 0.25 ng/mL   Lactic Acid, Plasma    Collection Time: 01/18/20 11:53 PM   Result Value Ref Range    Lactate 2.9 (C) 0.5 - 2.0 mmol/L   Urinalysis With Culture If Indicated - Urine, Catheter In/Out    Collection Time: 01/19/20 12:31 AM   Result Value Ref Range    Color, UA Yellow Yellow, Straw    Appearance, UA Clear Clear    pH, UA <=5.0 5.0 - 8.0    Specific Gravity, UA 1.014 1.005 - 1.030    Glucose, UA Negative Negative    Ketones, UA Negative Negative    Bilirubin, UA Negative Negative    Blood, UA Negative Negative    Protein, UA Negative Negative    Leuk Esterase, UA Small (1+) (A) Negative    Nitrite, UA Negative Negative    Urobilinogen, UA 0.2 E.U./dL 0.2 - 1.0 E.U./dL   Urinalysis, Microscopic Only - Urine, Catheter In/Out    Collection Time: 01/19/20 12:31 AM   Result Value Ref Range    RBC, UA 0-2 None Seen, 0-2 /HPF    WBC, UA 13-20 (A) None Seen, 0-2 /HPF    Bacteria, UA None Seen None Seen /HPF    Squamous Epithelial Cells, UA 0-2 None Seen, 0-2 /HPF    Hyaline Casts, UA 0-2 None Seen /LPF    Methodology Automated Microscopy        Ordered the above labs and independently reviewed the results.        RADIOLOGY  Ct Abdomen Pelvis With Contrast    Result Date: 1/19/2020  CT OF THE ABDOMEN AND PELVIS WITH CONTRAST  HISTORY: Abdominal pain  COMPARISON: 01/10/2020  TECHNIQUE: Axial CT imaging was obtained from the hemidiaphragms to symphysis pubis. IV contrast was administered.  FINDINGS: Images through the lung bases demonstrate bibasilar consolidation. This was also present on prior exam, but may have worsened slightly. Bilateral pleural effusions are present. Right pleural effusion appears larger. The patient is noted to have marked distention of the stomach. Exact etiology is uncertain. It may represent nonspecific gastroparesis/gastric ileus. There is also a dilated and patulous appearance to the esophagus as well. The liver  is unremarkable, other than some periportal edema. The patient does have cholelithiasis. Spleen is unremarkable. The adrenal glands are normal. Pancreas is atrophic. There is no hydronephrosis. Nonobstructing stones are seen within both kidneys. There are bilateral renal cysts. There is no evidence of mechanical small bowel obstruction. Urinary bladder appears unremarkable. Dystrophic calcifications are seen within the prostate gland. Large volume of fecal material is seen throughout the colon, suggesting constipation. The appendix is within normal limits. There is calcification of the abdominal aorta. There is diffuse body wall edema. There is also mesenteric edema. No acute osseous abnormalities are seen.       1. Marked distention of the stomach. Duodenum appears unremarkable, and there is no evidence of bowel obstruction. Findings may reflect gastroparesis/gastric ileus. There is patulous and fluid-filled appearance to the distal esophagus, favored to be secondary to stasis within the stomach. Patient may benefit from nasogastric decompression. While there is no convincing evidence of mechanical small bowel obstruction on the patient does have a large volume of fecal material throughout the colon, suggesting constipation. 2. Slight interval increase in bibasilar consolidation. This may represent atelectasis, although pneumonia is not excluded. Correlation with clinical presentation is recommended. Persistent bilateral pleural effusions are noted.  Radiation dose reduction techniques were utilized, including automated exposure control and exposure modulation based on body size.  This report was finalized on 1/19/2020 1:23 AM by Dr. Kitty Moran M.D.      Xr Chest 1 View    Result Date: 1/19/2020  PORTABLE CHEST RADIOGRAPH  HISTORY: Weakness  COMPARISON: January 2020  FINDINGS: Cardiomegaly is identified. There is vascular congestion. This is new when compared to prior exam. Worsening atelectasis is noted at  the lung bases. No pneumothorax is seen. Massive gaseous distention of bowel within the upper abdomen is seen. This is new when compared to prior exam. Dedicated imaging of the abdomen is suggested.       1. Cardiomegaly and vascular congestion, new when compared to prior exam. 2. Worsening bibasilar atelectasis. 3. Marked gaseous distention of bowel seen within the upper abdomen.  This report was finalized on 1/19/2020 12:33 AM by Dr. Kitty Moran M.D.        I ordered the above noted radiological studies. Reviewed by me and discussed with radiologist.  See dictation for official radiology interpretation.      PROCEDURES    Procedures    EKG           EKG time: 2349  Rhythm/Rate: sinus rate of 75  P waves and ND: JAE borderline 1st degree AV block  QRS, axis: Narrow QRS complex   ST and T waves:   No STEMI  QTC is within normal limits  There is some underlying artifact    Interpreted Contemporaneously by me, independently viewed  Comparison: 01/08/20 - has significant underlying artifact that limits interpretations      MEDICATIONS GIVEN IN ER    Medications   sodium chloride 0.9 % flush 10 mL (has no administration in time range)   sodium chloride 0.9 % infusion (125 mL/hr Intravenous New Bag 1/19/20 0119)   sodium chloride 0.9 % bolus 500 mL (500 mL Intravenous New Bag 1/19/20 0019)   ondansetron (ZOFRAN) injection 4 mg (4 mg Intravenous Given 1/19/20 0011)   fentaNYL citrate (PF) (SUBLIMAZE) injection 50 mcg (50 mcg Intravenous Given 1/19/20 0011)   iopamidol (ISOVUE-300) 61 % injection 100 mL (85 mL Intravenous Given 1/19/20 0100)         PROGRESS, DATA ANALYSIS, CONSULTS, AND MEDICAL DECISION MAKING      2348: IV fluids ordered for hydration. Zofran ordered as an anti-emetic    2349: Ordered CXR, labs, and CT abd for further evaluation.    0114: Per RN, pt has reportedly had multiple episodes of episodes in ED.    0155: [facility] accepts pt for admission for Dr. Mccracken    All labs have been  independently reviewed by me.  All radiology studies have been reviewed by me and discussed with radiologist dictating the report.   EKG's independently viewed and interpreted by me.  Discussion below represents my analysis of pertinent findings related to patient's condition, differential diagnosis, treatment plan and final disposition.      ED Course as of Jan 19 0154   Sun Jan 19, 2020 0102 Trending down   Troponin T(!!): 0.064 [RS]   0139 Given that the patient has a lactic acid that has gone up, has recurrent vomiting, and has had recurrent hypoglycemia, as well as, was just discharged from this facility 48 hours ago, he is considered a poor candidate for transfer at this time.    [RS]      ED Course User Index  [RS] Sabas Steel MD       AS OF 1:54 AM VITALS:    BP - 160/74  HR - 72  TEMP - 97.4 °F (36.3 °C) (Tympanic)  O2 SATS - 96%        DIAGNOSIS  Final diagnoses:   Acute abdominal pain   Lactic acidosis   Intractable vomiting with nausea, unspecified vomiting type   History of UTI   History of pneumonia   Recurrent Hypoglycemia   Gastric distention         DISPOSITION  ADMISSION    Discussed treatment plan and reason for admission with pt/family and admitting physician.  Pt/family voiced understanding of the plan for admission for further testing/treatment as needed.             Documentation assistance provided by sita Stephens for Sabas Steel MD.  Information recorded by the scribe was done at my direction and has been verified and validated by me.         Mya Stephens  01/19/20 0122       Sabas Steel MD  01/19/20 0154

## 2020-01-19 NOTE — ED NOTES
"Nursing report ED to floor  Raymon Solis  79 y.o.  male    HPI (triage note):   Chief Complaint   Patient presents with   • Hypoglycemia   • Abdominal Pain   • Nausea       Admitting doctor:   Javy Mccracken MD    Admitting diagnosis:   The primary encounter diagnosis was Acute abdominal pain. Diagnoses of Lactic acidosis, Intractable vomiting with nausea, unspecified vomiting type, History of UTI, History of pneumonia, Recurrent Hypoglycemia, and Gastric distention were also pertinent to this visit.    Code status:   Current Code Status     Date Active Code Status Order ID Comments User Context       Prior          Allergies:   Patient has no known allergies.    Weight:       01/18/20  2348   Weight: 75.8 kg (167 lb)       Most recent vitals:   Vitals:    01/18/20 2328 01/18/20 2348 01/19/20 0109 01/19/20 0116   BP:   160/74    Pulse:    72   Resp:       Temp:  97.4 °F (36.3 °C)     TempSrc:  Tympanic     SpO2:       Weight:  75.8 kg (167 lb)     Height: 165.1 cm (65\")          Active LDAs/IV Access:   Lines, Drains & Airways    Active LDAs     Name:   Placement date:   Placement time:   Site:   Days:    Peripheral IV 01/18/20 Right Forearm   01/18/20    --    Forearm   1    Peripheral IV 01/19/20 0048 Left Antecubital   01/19/20 0048    Antecubital   less than 1    NG/OG Tube Nasogastric 18 Fr Left nostril   01/19/20    0228    Left nostril   less than 1                Labs (abnormal labs have a star):   Labs Reviewed   COMPREHENSIVE METABOLIC PANEL - Abnormal; Notable for the following components:       Result Value    BUN 25 (*)     Sodium 148 (*)     CO2 29.4 (*)     Albumin 2.70 (*)     Alkaline Phosphatase 167 (*)     Total Bilirubin <0.2 (*)     All other components within normal limits    Narrative:     GFR Normal >60  Chronic Kidney Disease <60  Kidney Failure <15     URINALYSIS W/ CULTURE IF INDICATED - Abnormal; Notable for the following components:    Leuk Esterase, UA Small (1+) (*)     All " other components within normal limits   TROPONIN (IN-HOUSE) - Abnormal; Notable for the following components:    Troponin T 0.064 (*)     All other components within normal limits    Narrative:     Troponin T Reference Range:  <= 0.03 ng/mL-   Negative for AMI  >0.03 ng/mL-     Abnormal for myocardial necrosis.  Clinicians would have to utilize clinical acumen, EKG, Troponin and serial changes to determine if it is an Acute Myocardial Infarction or myocardial injury due to an underlying chronic condition.       Results may be falsely decreased if patient taking Biotin.     LACTIC ACID, PLASMA - Abnormal; Notable for the following components:    Lactate 2.9 (*)     All other components within normal limits   CBC WITH AUTO DIFFERENTIAL - Abnormal; Notable for the following components:    WBC 12.84 (*)     RBC 3.38 (*)     Hemoglobin 9.9 (*)     Hematocrit 30.0 (*)     Neutrophil % 79.8 (*)     Lymphocyte % 11.4 (*)     Neutrophils, Absolute 10.25 (*)     Immature Grans, Absolute 0.06 (*)     All other components within normal limits   URINALYSIS, MICROSCOPIC ONLY - Abnormal; Notable for the following components:    WBC, UA 13-20 (*)     All other components within normal limits   POCT GLUCOSE FINGERSTICK - Abnormal; Notable for the following components:    Glucose 64 (*)     All other components within normal limits   LIPASE - Normal   PROCALCITONIN - Normal    Narrative:     As a Marker for Sepsis (Non-Neonates):   1. <0.5 ng/mL represents a low risk of severe sepsis and/or septic shock.  1. >2 ng/mL represents a high risk of severe sepsis and/or septic shock.    As a Marker for Lower Respiratory Tract Infections that require antibiotic therapy:  PCT on Admission     Antibiotic Therapy             6-12 Hrs later  > 0.5                Strongly Recommended            >0.25 - <0.5         Recommended  0.1 - 0.25           Discouraged                   Remeasure/reassess PCT  <0.1                 Strongly Discouraged      "     Remeasure/reassess PCT      As 28 day mortality risk marker: \"Change in Procalcitonin Result\" (> 80 % or <=80 %) if Day 0 (or Day 1) and Day 4 values are available. Refer to http://www.[facility]-pct-calculator.com/   Change in PCT <=80 %   A decrease of PCT levels below or equal to 80 % defines a positive change in PCT test result representing a higher risk for 28-day all-cause mortality of patients diagnosed with severe sepsis or septic shock.  Change in PCT > 80 %   A decrease of PCT levels of more than 80 % defines a negative change in PCT result representing a lower risk for 28-day all-cause mortality of patients diagnosed with severe sepsis or septic shock.                Results may be falsely decreased if patient taking Biotin.    URINE CULTURE   LACTIC ACID REFLEX TIMER   POCT GLUCOSE FINGERSTICK   CBC AND DIFFERENTIAL    Narrative:     The following orders were created for panel order CBC & Differential.  Procedure                               Abnormality         Status                     ---------                               -----------         ------                     CBC Auto Differential[[id number]]        Abnormal            Final result                 Please view results for these tests on the individual orders.       EKG:   ECG 12 Lead   Preliminary Result   HEART RATE= 74  bpm   RR Interval= 810  ms   [place] Interval=   ms   P Horizontal Axis=   deg   P Front Axis=   deg   QRSD Interval= 98  ms   QT Interval= 409  ms   QRS Axis= 11  deg   T Wave Axis= 52  deg   - ABNORMAL ECG -   Atrial flutter with predominant 4:1 AV block   Probable anteroseptal infarct, recent   Electronically Signed By:    Date and Time of Study: 2020-01-18 23:49:20          Meds given in ED:   Medications   sodium chloride 0.9 % flush 10 mL (has no administration in time range)   sodium chloride 0.9 % infusion (125 mL/hr Intravenous New Bag 1/19/20 0119)   dextrose 5 % and sodium chloride 0.9 % infusion (125 mL/hr " Intravenous New Bag 1/19/20 0230)   sodium chloride 0.9 % bolus 500 mL (0 mL Intravenous Stopped 1/19/20 0231)   ondansetron (ZOFRAN) injection 4 mg (4 mg Intravenous Given 1/19/20 0011)   fentaNYL citrate (PF) (SUBLIMAZE) injection 50 mcg (50 mcg Intravenous Given 1/19/20 0011)   iopamidol (ISOVUE-300) 61 % injection 100 mL (85 mL Intravenous Given 1/19/20 0100)   dextrose (D50W) 25 g/ 50mL Intravenous Solution 25 g (25 g Intravenous Given 1/19/20 0221)       Imaging results:  Ct Abdomen Pelvis With Contrast    Result Date: 1/19/2020   1. Marked distention of the stomach. Duodenum appears unremarkable, and there is no evidence of bowel obstruction. Findings may reflect gastroparesis/gastric ileus. There is patulous and fluid-filled appearance to the distal esophagus, favored to be secondary to stasis within the stomach. Patient may benefit from nasogastric decompression. While there is no convincing evidence of mechanical small bowel obstruction on the patient does have a large volume of fecal material throughout the colon, suggesting constipation. 2. Slight interval increase in bibasilar consolidation. This may represent atelectasis, although pneumonia is not excluded. Correlation with clinical presentation is recommended. Persistent bilateral pleural effusions are noted.  Radiation dose reduction techniques were utilized, including automated exposure control and exposure modulation based on body size.  This report was finalized on 1/19/2020 1:23 AM by Dr. Kitty Moran M.D.      Xr Chest 1 View    Result Date: 1/19/2020   1. Cardiomegaly and vascular congestion, new when compared to prior exam. 2. Worsening bibasilar atelectasis. 3. Marked gaseous distention of bowel seen within the upper abdomen.  This report was finalized on 1/19/2020 12:33 AM by Dr. Kitty Moran M.D.        Ambulatory status:   - bedrest    Social issues:   Social History     Socioeconomic History   • Marital status:       Spouse name: Not on file   • Number of children: Not on file   • Years of education: Not on file   • Highest education level: Not on file   Tobacco Use   • Smoking status: Never Smoker   • Smokeless tobacco: Never Used   Substance and Sexual Activity   • Alcohol use: No     Frequency: Never   • Drug use: No   • Sexual activity: Nkechi Conti, RN  01/19/20 0234

## 2020-01-19 NOTE — CONSULTS
Consultation note    Referring physician: Javy Mccracken MD    Consulting Physician: Chavez Reyes MD    Reason for consultation: Gastric distention    Chief Complaint   Patient presents with   • Hypoglycemia   • Abdominal Pain   • Nausea       HPI:   The patient is a very pleasant 79 y.o. years old male that presented to the hospital with hypoglycemia.  The patient reports being at the nursing home found to be hypo-glycemic.  They were trying to get his blood sugars normalized but they could not.  At that time he was having abdominal pain.  He presented to the emergency room.  He had one episode of vomiting.  He underwent CT scan of the abdomen that showed gastric distention.  He had NG tube placed.  He reports no more abdominal pain since then.  He reports passing gas and having regular bowel movements.  He has never have problems with nausea and vomiting.  He was discharged from the hospital on 1/16/2024 bacteremia due to E. coli UTI and pneumonia.  He denies any dysuria or hematuria      Past Medical History:   Diagnosis Date   • Aortic stenosis    • Bacteremia due to Escherichia coli    • Diabetes mellitus (CMS/HCC)    • Hypertension    • UTI (urinary tract infection)    -Pneumonia  -Encephalopathy    Past Surgical History:   Procedure Laterality Date   • CATARACT EXTRACTION, BILATERAL           Current Facility-Administered Medications:   •  acetaminophen (TYLENOL) tablet 650 mg, 650 mg, Oral, Q4H PRN **OR** acetaminophen (TYLENOL) 160 MG/5ML solution 650 mg, 650 mg, Oral, Q4H PRN **OR** acetaminophen (TYLENOL) suppository 650 mg, 650 mg, Rectal, Q4H PRN, Dian Orellana APRN  •  lactated ringers infusion, 100 mL/hr, Intravenous, Continuous, Dian Orellana APRN, Last Rate: 100 mL/hr at 01/19/20 0603, 100 mL/hr at 01/19/20 0603  •  nitroglycerin (NITROSTAT) SL tablet 0.4 mg, 0.4 mg, Sublingual, Q5 Min PRN, Dian Orellana APRN  •  ondansetron (ZOFRAN) injection 4 mg, 4 mg, Intravenous,  Q6H PRN, Dian Orellana APRN  •  [COMPLETED] Insert peripheral IV, , , Once **AND** sodium chloride 0.9 % flush 10 mL, 10 mL, Intravenous, PRN, Sabas Steel MD  •  sodium chloride 0.9 % flush 10 mL, 10 mL, Intravenous, Q12H, Dian Orellana APRN, 10 mL at 01/19/20 0833  •  sodium chloride 0.9 % flush 10 mL, 10 mL, Intravenous, PRN, Dian Orellana APRN    No Known Allergies    Social History     Socioeconomic History   • Marital status:      Spouse name: Not on file   • Number of children: Not on file   • Years of education: Not on file   • Highest education level: Not on file   Tobacco Use   • Smoking status: Never Smoker   • Smokeless tobacco: Never Used   Substance and Sexual Activity   • Alcohol use: No     Frequency: Never   • Drug use: No   • Sexual activity: Defer       No family history on file.    ROS:   Constitutional: denies any weight changes, reports fatigue and weakness.  Eyes: : denies blurred or double vision  Cardiovascular: denies chest pain, palpitations, edemas.  Respiratory: denies cough, sputum, SOB.  Gastrointestinal: denies N&V, abd pain, diarrhea, constipation.  Genitourinary: denies dysuria, frequency.  Endocrine: denies cold intolerance, lethargy and flushing.  Hem: denies excessive bruising and postop bleeding.  Musculoskeletal: denies weakness, joint swelling, pain or stiffness.  Neuro: denies seizures, CVA, paresthesia, or peripheral neuropathy.   Skin: denies change in nevi, rashes, masses.    Physical Exam:   Vitals:    01/19/20 0739   BP: 151/68   Pulse: 73   Resp: 18   Temp: 97.9 °F (36.6 °C)   SpO2: 94%     GENERAL:oriented to person, place, and time and chronically ill appearing  HEENT: normochephalic, atraumatic NG in place with clear fluid  NECK: Supple there is no thyromegaly or lymphadenopathy  CHEST: clear to auscultation, no wheezes, rales or rhonchi, symmetric air entry  CARDIAC: regular rate and rhythm    ABDOMEN: soft, nontender,  nondistended, no masses or organomegaly  EXTREMITIES: no cyanosis, clubbing or edema    NEURO: alert and oriented, normal speech, cranial nerves 2-12 grossly intact, no focal deficits   SKIN: Moist, warm, no rashes.    Diagnostic workup:   CT scan of the abdomen and pelvis 1/19/2020: Gastric distention, normal duodenum, no evidence of bowel obstruction nonobstructing stones in both kidneys, bibasilar consolidations and bilateral pleural effusion    White blood cell count 19.1  Hemoglobin 10  CO2 29  Creatinine 0.8  Lactate normal    UA with white blood cell    Assessment and plan:   The patient is a very pleasant 79 y.o. years old male with abnormal CT scan of the stomach.  Had NG tube placed and he reports no abdominal pain.  There is no evidence of gastric outlet obstruction.  He probably has gastric ileus secondary to ongoing infectious process that did not seem to be coming from his abdomen.  I am not sure why there is a question about gastroparesis when he was admitted 2 days ago and he was able to tolerate diet without any problem.  His blood sugar has been controlled    -I will clamp NG tube for 6 hours and if no nausea and vomiting I will discontinue  -I do not think he has any type of surgical problem  -Antibiotics for pneumonia per primary team  -Does not need to have any other work-up for his gas-distention for now    Case was discussed with  patient.    Risk and benefits of the current recommended treatment were explained to the patient that had time to ask questions that where answered, verbalized understanding and agreed with the plan.     Chavez Reyes MD  General, Minimally Invasive and Endoscopic Surgery  [facility]    [address], [address]  [place], [place], [facility]  P: [phone]  F: [phone]

## 2020-01-19 NOTE — PLAN OF CARE
Blood glucose monitoring. NGT clamped for 6 hrs without N/V or pain. ECHO completed. Bladder scan 244. IVF and IV ABT continues.

## 2020-01-19 NOTE — PLAN OF CARE
Patient admitted from er, [facility]. Databse completed, assessment completed. Iv fluids d5ns infusing. No c/o pain. Nausea noted, prn meds given. Vomited this shift. Bmx1 incont this shift. Pericare provided. N/g tube left nare at 55cm lws. No acute distress noted will continue to monitor

## 2020-01-19 NOTE — CONSULTS
Patient Care Team:  Magali Burgess MD as PCP - General (Internal Medicine)    CHIEF COMPLAINT: Gastric Distension    HISTORY OF PRESENT ILLNESS:    78 yo DM [facility] admittedhypoglycemic after recent admission for Pneumoia. Appeared septic with WBC, Lactate and borderline Procal, but gastric distensionn on CT. Has NGT and surgery has seen. Question of Reglan as well.    As per Lisa Umanzor....Raymon Solis is a 79 y.o. male with a history of medical noncompliance, HTN, DM 2 and aortic stenosis. Patient presented with complaints of abdominal pain that started a couple of days ago and was associated with generalized weakness, urinary frequency, N/V and myalgias. He was sent to the ER from the rehab facility due to hypoglycemia. EMS reported a blood sugar of 23 upon arrival and gave D50 with glucose up to 195 upon arrival to ED. HPI was difficult to obtain due to patient refusing to use  service and vague responses.     Patient was recently admitted to the hospital for E coli bacteremia with UTI and possible pneumonia and was discharged on 1/16/20. He was treated with a 10 day course of rocephin (1 dose to be completed at nursing facility). During his admission he did have issues with constipation and urinary retention improved prior to discharge.     Patient states since being at rehab he has still felt ill with complaints of fatigue, SOA, cough, urinary frequency and weakness.  He states that he has been able to eat and drink well and possibly had a small bowel movement yesterday.  His sugars have been running low at the facility and is on glipizide and metformin. Patient currently denies any pain, but report some recent abdominal discomfort.      Labs in the ER showed elevated troponin 0.064, sodium 148, lactate 2.9, WBC 12.84 that increased to 19 on repeat this morning and pro-Phillip 0.14.  CT of abdomen showed market distention of the stomach suggestive of possible gastroparesis or gastric ileus with  fluid-filled distal esophagus, colonic constipation but no significant evidence of mechanical bowel obstruction.  NG tube was placed.  .      Past Medical History:   Diagnosis Date   • Aortic stenosis    • Bacteremia due to Escherichia coli    • Diabetes mellitus (CMS/HCC)    • Hypertension    • UTI (urinary tract infection)      Past Surgical History:   Procedure Laterality Date   • CATARACT EXTRACTION, BILATERAL       No family history on file.  Social History     Tobacco Use   • Smoking status: Never Smoker   • Smokeless tobacco: Never Used   Substance Use Topics   • Alcohol use: No     Frequency: Never   • Drug use: No     Medications Prior to Admission   Medication Sig Dispense Refill Last Dose   • acetaminophen (TYLENOL) 325 MG tablet Take 2 tablets by mouth every 4 (Four) hours as needed for mild pain or fever. 30 tablet 0    • amLODIPine (NORVASC) 5 MG tablet Take 1 tablet by mouth Daily. 30 tablet 0 1/18/2020 at Unknown time   • aspirin 81 MG EC tablet Take 1 tablet by mouth daily. 30 tablet 0 1/18/2020 at Unknown time   • bisacodyl (DULCOLAX) 5 MG EC tablet Take 2 tablets by mouth daily as needed for constipation. 30 tablet 0    • brimonidine (ALPHAGAN) 0.2 % ophthalmic solution Administer 1 drop to both eyes 3 (Three) Times a Day.   More than a month at Unknown time   • dextrose (GLUTOSE) 40 % gel Take contents of one tube by mouth every 15 (Fifteen) minutes as needed for Low Blood Sugar (Blood sugar less than 70). 37.5 g 0 1/18/2020 at Unknown time   • docusate sodium 100 MG capsule Take 1 capsule by mouth 2 (Two) times a day. 30 each 0 1/18/2020 at Unknown time   • furosemide (LASIX) 40 MG tablet Take 0.5 tablets by mouth Daily. (Patient taking differently: Take 40 mg by mouth Daily.)   1/18/2020 at Unknown time   • glipiZIDE (GLUCOTROL) 10 MG tablet Take 10 mg by mouth 2 (Two) Times a Day Before Meals.   1/18/2020 at Unknown time   • latanoprost (XALATAN) 0.005 % ophthalmic solution Administer 1 drop  "to both eyes Every Night.   Past Week at Unknown time   • lisinopril (PRINIVIL,ZESTRIL) 10 MG tablet Take 1 tablet by mouth Daily. 30 tablet 0 1/18/2020 at Unknown time   • metFORMIN (GLUCOPHAGE) 1000 MG tablet Take 1,000 mg by mouth 2 (Two) Times a Day With Meals.   1/18/2020 at Unknown time   • polyethylene glycol (MIRALAX) powder Mix 17 grams (1 capful in liquid) and take by mouth 2 (Two) times a day. 238 g 0 1/18/2020 at Unknown time   • potassium chloride (K-DUR) 10 MEQ CR tablet Take 1 tablet by mouth Daily. 30 tablet 0 1/18/2020 at Unknown time   • Skin Protectants, Misc. (EUCERIN) cream Apply  topically to the appropriate area as directed 2 (Two) Times a Day. Applied to bilateral lower extermities      • tamsulosin (FLOMAX) 0.4 MG capsule 24 hr capsule Take 1 capsule by mouth every night. 30 capsule 0 1/18/2020 at Unknown time     Allergies:  Patient has no known allergies.    REVIEW OF SYSTEMS:  Please see the above history of present illness for pertinent positives and negatives.  The remainder of the patient's systems have been reviewed and are negative.     Vital Signs  Temp:  [97.4 °F (36.3 °C)-98.3 °F (36.8 °C)] 98.3 °F (36.8 °C)  Heart Rate:  [66-90] 73  Resp:  [18-20] 18  BP: (136-160)/(66-74) 136/66    Flowsheet Rows      First Filed Value   Admission Height  165.1 cm (65\") Documented at 01/18/2020 2328   Admission Weight  75.8 kg (167 lb) Documented at 01/18/2020 2348           Physical Exam:  Physical Exam   Constitutional: Patient appears well-developed and well-nourished and in no acute distress   HEENT:   Head: Normocephalic and atraumatic.   Eyes:  Pupils are equal, round, and reactive to light. EOM are intact. Sclerae are anicteric and non-injected.  Mouth and Throat: Patient has moist mucous membranes. Oropharynx is clear of any erythema or exudate.     Neck: Neck supple. No JVD present. No thyromegaly present. No lymphadenopathy present.  Cardiovascular: Regular rate, regular rhythm, S1 " normal and S2 normal.  Exam reveals no gallop and no friction rub.  No murmur heard.  Pulmonary/Chest: Lungs are clear to auscultation bilaterally. No respiratory distress. No wheezes. No rhonchi. No rales.   Abdominal: Soft. Bowel sounds are normal. No distension and no mass. There is no hepatosplenomegaly. There is no tenderness.   Musculoskeletal: Normal Muscle tone  Extremities: No edema. Pulses are palpable in all 4 extremities.  Neurological: Patient is alert and oriented to person, place, and time. Cranial nerves II-XII are grossly intact with no focal deficits.  Skin: Skin is warm. No rash noted. Nails show no clubbing.  No cyanosis or erythema.    Debilities/Disabilities Identified: None  Emotional Behavior: Appropriate     Results Review:    I reviewed the patient's new clinical results.  Lab Results (most recent)     Procedure Component Value Units Date/Time    POC Glucose Once [[id number]]  (Normal) Collected:  01/19/20 1446    Specimen:  Blood Updated:  01/19/20 1448     Glucose 81 mg/dL     Troponin [[id number]]  (Abnormal) Collected:  01/19/20 0750    Specimen:  Blood Updated:  01/19/20 1332     Troponin T 0.056 ng/mL     Narrative:       Troponin T Reference Range:  <= 0.03 ng/mL-   Negative for AMI  >0.03 ng/mL-     Abnormal for myocardial necrosis.  Clinicians would have to utilize clinical acumen, EKG, Troponin and serial changes to determine if it is an Acute Myocardial Infarction or myocardial injury due to an underlying chronic condition.       Results may be falsely decreased if patient taking Biotin.      Blood Culture - Blood, Arm, Left [[id number]] Collected:  01/19/20 1239    Specimen:  Blood from Arm, Left Updated:  01/19/20 1309    Blood Culture - Blood, Hand, Right [[id number]] Collected:  01/19/20 1204    Specimen:  Blood from Hand, Right Updated:  01/19/20 1210    POC Glucose Once [[id number]]  (Abnormal) Collected:  01/19/20 1143    Specimen:  Blood Updated:  01/19/20 1148     Glucose 69  mg/dL     Urine Culture - Urine, Urine, Catheter In/Out [[id number]]  (Normal) Collected:  01/19/20 0031    Specimen:  Urine, Catheter In/Out Updated:  01/19/20 1048     Urine Culture Culture in progress    Basic Metabolic Panel [[id number]]  (Abnormal) Collected:  01/19/20 0750    Specimen:  Blood Updated:  01/19/20 0835     Glucose 84 mg/dL      BUN 23 mg/dL      Creatinine 0.88 mg/dL      Sodium 146 mmol/L      Potassium 4.4 mmol/L      Chloride 106 mmol/L      CO2 29.3 mmol/L      Calcium 9.6 mg/dL      eGFR  African Amer 101 mL/min/1.73      eGFR Non African Amer 84 mL/min/1.73      BUN/Creatinine Ratio 26.1     Anion Gap 10.7 mmol/L     Narrative:       GFR Normal >60  Chronic Kidney Disease <60  Kidney Failure <15      CBC Auto Differential [[id number]]  (Abnormal) Collected:  01/19/20 0750    Specimen:  Blood Updated:  01/19/20 0812     WBC 19.16 10*3/mm3      RBC 3.40 10*6/mm3      Hemoglobin 10.0 g/dL      Hematocrit 29.8 %      MCV 87.6 fL      MCH 29.4 pg      MCHC 33.6 g/dL      RDW 13.3 %      RDW-SD 41.6 fl      MPV 8.7 fL      Platelets 364 10*3/mm3      Neutrophil % 86.1 %      Lymphocyte % 6.6 %      Monocyte % 5.8 %      Eosinophil % 0.4 %      Basophil % 0.4 %      Immature Grans % 0.7 %      Neutrophils, Absolute 16.51 10*3/mm3      Lymphocytes, Absolute 1.26 10*3/mm3      Monocytes, Absolute 1.11 10*3/mm3      Eosinophils, Absolute 0.07 10*3/mm3      Basophils, Absolute 0.07 10*3/mm3      Immature Grans, Absolute 0.14 10*3/mm3      nRBC 0.0 /100 WBC     Lactic Acid, Reflex [[id number]]  (Normal) Collected:  01/19/20 0409    Specimen:  Blood Updated:  01/19/20 0431     Lactate 1.7 mmol/L     Lactic Acid, Reflex Timer (This will reflex a repeat order 3-3:15 hours after ordered.) [[id number]] Collected:  01/18/20 2353    Specimen:  Blood Updated:  01/19/20 0330     Extra Tube Hold for add-ons.     Comment: Auto resulted.       Urinalysis, Microscopic Only - Urine, Catheter In/Out [[id number]]   (Abnormal) Collected:  01/19/20 0031    Specimen:  Urine, Catheter In/Out Updated:  01/19/20 0045     RBC, UA 0-2 /HPF      WBC, UA 13-20 /HPF      Bacteria, UA None Seen /HPF      Squamous Epithelial Cells, UA 0-2 /HPF      Hyaline Casts, UA 0-2 /LPF      Methodology Automated Microscopy    Urinalysis With Culture If Indicated - Urine, Catheter In/Out [[id number]]  (Abnormal) Collected:  01/19/20 0031    Specimen:  Urine, Catheter In/Out Updated:  01/19/20 0045     Color, UA Yellow     Appearance, UA Clear     pH, UA <=5.0     Specific Gravity, UA 1.014     Glucose, UA Negative     Ketones, UA Negative     Bilirubin, UA Negative     Blood, UA Negative     Protein, UA Negative     Leuk Esterase, UA Small (1+)     Nitrite, UA Negative     Urobilinogen, UA 0.2 E.U./dL    Lactic Acid, Plasma [[id number]]  (Abnormal) Collected:  01/18/20 2353    Specimen:  Blood Updated:  01/19/20 0028     Lactate 2.9 mmol/L     Troponin [[id number]]  (Abnormal) Collected:  01/18/20 2350    Specimen:  Blood Updated:  01/19/20 0028     Troponin T 0.064 ng/mL     Narrative:       Troponin T Reference Range:  <= 0.03 ng/mL-   Negative for AMI  >0.03 ng/mL-     Abnormal for myocardial necrosis.  Clinicians would have to utilize clinical acumen, EKG, Troponin and serial changes to determine if it is an Acute Myocardial Infarction or myocardial injury due to an underlying chronic condition.       Results may be falsely decreased if patient taking Biotin.      Procalcitonin [[id number]]  (Normal) Collected:  01/18/20 2350    Specimen:  Blood Updated:  01/19/20 0026     Procalcitonin 0.14 ng/mL     Narrative:       As a Marker for Sepsis (Non-Neonates):   1. <0.5 ng/mL represents a low risk of severe sepsis and/or septic shock.  1. >2 ng/mL represents a high risk of severe sepsis and/or septic shock.    As a Marker for Lower Respiratory Tract Infections that require antibiotic therapy:  PCT on Admission     Antibiotic Therapy             6-12 Hrs  "later  > 0.5                Strongly Recommended            >0.25 - <0.5         Recommended  0.1 - 0.25           Discouraged                   Remeasure/reassess PCT  <0.1                 Strongly Discouraged          Remeasure/reassess PCT      As 28 day mortality risk marker: \"Change in Procalcitonin Result\" (> 80 % or <=80 %) if Day 0 (or Day 1) and Day 4 values are available. Refer to [email or web address]   Change in PCT <=80 %   A decrease of PCT levels below or equal to 80 % defines a positive change in PCT test result representing a higher risk for 28-day all-cause mortality of patients diagnosed with severe sepsis or septic shock.  Change in PCT > 80 %   A decrease of PCT levels of more than 80 % defines a negative change in PCT result representing a lower risk for 28-day all-cause mortality of patients diagnosed with severe sepsis or septic shock.                Results may be falsely decreased if patient taking Biotin.     Comprehensive Metabolic Panel [[id number]]  (Abnormal) Collected:  01/18/20 2350    Specimen:  Blood Updated:  01/19/20 0022     Glucose 97 mg/dL      BUN 25 mg/dL      Creatinine 1.00 mg/dL      Sodium 148 mmol/L      Potassium 4.5 mmol/L      Chloride 107 mmol/L      CO2 29.4 mmol/L      Calcium 9.6 mg/dL      Total Protein 6.3 g/dL      Albumin 2.70 g/dL      ALT (SGPT) 16 U/L      AST (SGOT) 25 U/L      Alkaline Phosphatase 167 U/L      Total Bilirubin <0.2 mg/dL      eGFR Non African Amer 72 mL/min/1.73      eGFR  African Amer 87 mL/min/1.73      Globulin 3.6 gm/dL      A/G Ratio 0.8 g/dL      BUN/Creatinine Ratio 25.0     Anion Gap 11.6 mmol/L     Narrative:       GFR Normal >60  Chronic Kidney Disease <60  Kidney Failure <15      Lipase [[id number]]  (Normal) Collected:  01/18/20 2350    Specimen:  Blood Updated:  01/19/20 0020     Lipase 40 U/L     CBC & Differential [[id number]] Collected:  01/18/20 2350    Specimen:  Blood Updated:  01/19/20 0002    Narrative:   "     The following orders were created for panel order CBC & Differential.  Procedure                               Abnormality         Status                     ---------                               -----------         ------                     CBC Auto Differential[[id number]]        Abnormal            Final result                 Please view results for these tests on the individual orders.    CBC Auto Differential [[id number]]  (Abnormal) Collected:  01/18/20 2350    Specimen:  Blood Updated:  01/19/20 0002     WBC 12.84 10*3/mm3      RBC 3.38 10*6/mm3      Hemoglobin 9.9 g/dL      Hematocrit 30.0 %      MCV 88.8 fL      MCH 29.3 pg      MCHC 33.0 g/dL      RDW 13.4 %      RDW-SD 43.4 fl      MPV 8.9 fL      Platelets 354 10*3/mm3      Neutrophil % 79.8 %      Lymphocyte % 11.4 %      Monocyte % 7.0 %      Eosinophil % 0.8 %      Basophil % 0.5 %      Immature Grans % 0.5 %      Neutrophils, Absolute 10.25 10*3/mm3      Lymphocytes, Absolute 1.46 10*3/mm3      Monocytes, Absolute 0.90 10*3/mm3      Eosinophils, Absolute 0.10 10*3/mm3      Basophils, Absolute 0.07 10*3/mm3      Immature Grans, Absolute 0.06 10*3/mm3      nRBC 0.0 /100 WBC           Imaging Results (Most Recent)     Procedure Component Value Units Date/Time    CT Abdomen Pelvis With Contrast [[id number]] Collected:  01/19/20 0114     Updated:  01/19/20 0126    Narrative:       CT OF THE ABDOMEN AND PELVIS WITH CONTRAST     HISTORY: Abdominal pain     COMPARISON: 01/10/2020     TECHNIQUE: Axial CT imaging was obtained from the hemidiaphragms to  symphysis pubis. IV contrast was administered.     FINDINGS:  Images through the lung bases demonstrate bibasilar consolidation. This  was also present on prior exam, but may have worsened slightly.  Bilateral pleural effusions are present. Right pleural effusion appears  larger. The patient is noted to have marked distention of the stomach.  Exact etiology is uncertain. It may represent  nonspecific  gastroparesis/gastric ileus. There is also a dilated and patulous  appearance to the esophagus as well. The liver is unremarkable, other  than some periportal edema. The patient does have cholelithiasis. Spleen  is unremarkable. The adrenal glands are normal. Pancreas is atrophic.  There is no hydronephrosis. Nonobstructing stones are seen within both  kidneys. There are bilateral renal cysts. There is no evidence of  mechanical small bowel obstruction. Urinary bladder appears  unremarkable. Dystrophic calcifications are seen within the prostate  gland. Large volume of fecal material is seen throughout the colon,  suggesting constipation. The appendix is within normal limits. There is  calcification of the abdominal aorta. There is diffuse body wall edema.  There is also mesenteric edema. No acute osseous abnormalities are seen.       Impression:          1. Marked distention of the stomach. Duodenum appears unremarkable, and  there is no evidence of bowel obstruction. Findings may reflect  gastroparesis/gastric ileus. There is patulous and fluid-filled  appearance to the distal esophagus, favored to be secondary to stasis  within the stomach. Patient may benefit from nasogastric decompression.  While there is no convincing evidence of mechanical small bowel  obstruction on the patient does have a large volume of fecal material  throughout the colon, suggesting constipation.  2. Slight interval increase in bibasilar consolidation. This may  represent atelectasis, although pneumonia is not excluded. Correlation  with clinical presentation is recommended. Persistent bilateral pleural  effusions are noted.     Radiation dose reduction techniques were utilized, including automated  exposure control and exposure modulation based on body size.     This report was finalized on 1/19/2020 1:23 AM by Dr. Kitty Moran M.D.       XR Chest 1 View [[id number]] Collected:  01/19/20 0030     Updated:  01/19/20  0036    Narrative:       PORTABLE CHEST RADIOGRAPH     HISTORY: Weakness     COMPARISON: January 2020     FINDINGS:  Cardiomegaly is identified. There is vascular congestion. This is new  when compared to prior exam. Worsening atelectasis is noted at the lung  bases. No pneumothorax is seen. Massive gaseous distention of bowel  within the upper abdomen is seen. This is new when compared to prior  exam. Dedicated imaging of the abdomen is suggested.       Impression:          1. Cardiomegaly and vascular congestion, new when compared to prior  exam.  2. Worsening bibasilar atelectasis.  3. Marked gaseous distention of bowel seen within the upper abdomen.     This report was finalized on 1/19/2020 12:33 AM by Dr. Kitty Moran M.D.           reviewed    ECG/EMG Results (most recent)     Procedure Component Value Units Date/Time    ECG 12 Lead [[id number]] Collected:  01/19/20 0402     Updated:  01/19/20 0626    Narrative:       HEART RATE= 72  bpm  RR Interval= 840  ms  CA Interval= 92  ms  P Horizontal Axis= -61  deg  P Front Axis= 268  deg  QRSD Interval= 95  ms  QT Interval= 573  ms  QRS Axis= 5  deg  T Wave Axis=   deg  - ABNORMAL ECG -  Low voltage, extremity leads  Prolonged QT interval  Atrial flutter  NO SIGNIFICANT CHANGE FROM PREVIOUS ECG  Electronically Signed By: Ted Randolph ([facility]) 19-Jan-2020 06:25:51  Date and Time of Study: 2020-01-19 04:02:00    ECG 12 Lead [[id number]] Collected:  01/18/20 2349     Updated:  01/19/20 0626    Narrative:       HEART RATE= 74  bpm  RR Interval= 810  ms  CA Interval=   ms  P Horizontal Axis=   deg  P Front Axis=   deg  QRSD Interval= 98  ms  QT Interval= 409  ms  QRS Axis= 11  deg  T Wave Axis= 52  deg  - ABNORMAL ECG -  Atrial flutter with predominant 4:1 AV block probab;y new vs previous ecg  Probable anteroseptal infarct, recent  Electronically Signed By: Ted Randolph ([facility]) 19-Jan-2020 06:26:17  Date and Time of Study: 2020-01-18 23:49:20         reviewed    Assessment/Plan     Bibasilar Pneumoia  Gastric Distension   Hypoglycemia  DM  Constipation    The clinical picture is of reactive ileus and marked gastric distension, Surgery managing NGT and feel short term Reglan may benefit his progress but once his Pneumonia improves all of his gi issues will follow suit. WIll have BGA check on but no indication for Endoscopy at this time.     I discussed the patients findings and my recommendations with patient and Daughter.     Jimmy Marie MD  01/19/20  3:23 PM    Time: Not recorded

## 2020-01-19 NOTE — H&P
History and Physical    Patient Name: Raymon Solis  Age/Sex: 79 y.o. male  : 1940  MRN: [id number]    Date of Admission: 2020  Date of Encounter Visit: 20  Encounter Provider: COMPA Claudio  Place of Service: [facility]  Patient Care Team:  Magali Burgess MD as PCP - General (Internal Medicine)    Subjective:     Chief Complaint:   Chief Complaint   Patient presents with   • Hypoglycemia   • Abdominal Pain   • Nausea       History of Present Illness  Raymon Solis is a 79 y.o. male with a history of medical noncompliance, HTN, DM 2 and aortic stenosis. Patient presented with complaints of abdominal pain that started a couple of days ago and was associated with generalized weakness, urinary frequency, N/V and myalgias. He was sent to the ER from the rehab facility due to hypoglycemia. EMS reported a blood sugar of 23 upon arrival and gave D50 with glucose up to 195 upon arrival to ED. HPI was difficult to obtain due to patient refusing to use  service and vague responses.    Patient was recently admitted to the hospital for E coli bacteremia with UTI and possible pneumonia and was discharged on 20. He was treated with a 10 day course of rocephin (1 dose to be completed at nursing facility). During his admission he did have issues with constipation and urinary retention improved prior to discharge.    Patient states since being at rehab he has still felt ill with complaints of fatigue, SOA, cough, urinary frequency and weakness.  He states that he has been able to eat and drink well and possibly had a small bowel movement yesterday.  His sugars have been running low at the facility and is on glipizide and metformin. Patient currently denies any pain, but report some recent abdominal discomfort.     Labs in the ER showed elevated troponin 0.064, sodium 148, lactate 2.9, WBC 12.84 that increased to 19 on repeat this morning and pro-Phillip 0.14.  CT of abdomen  showed market distention of the stomach suggestive of possible gastroparesis or gastric ileus with fluid-filled distal esophagus, colonic constipation but no significant evidence of mechanical bowel obstruction.  NG tube was placed.      Review of Systems   Constitutional: Positive for fatigue. Negative for chills and fever.   HENT: Negative for congestion and trouble swallowing.    Eyes: Negative for visual disturbance.   Respiratory: Positive for cough and shortness of breath. Negative for wheezing.    Cardiovascular: Negative for chest pain, palpitations and leg swelling.   Gastrointestinal: Positive for abdominal pain, constipation and nausea. Negative for diarrhea and vomiting.   Genitourinary: Positive for frequency. Negative for difficulty urinating and dysuria.   Musculoskeletal: Positive for myalgias. Negative for back pain.   Neurological: Positive for weakness and light-headedness. Negative for dizziness and syncope.   Psychiatric/Behavioral: Negative for confusion. The patient is not nervous/anxious.    All other systems reviewed and are negative.      Past Medical and Surgical History:  Past Medical History:   Diagnosis Date   • Aortic stenosis    • Diabetes mellitus (CMS/HCC)    • Hypertension        Past Surgical History:   Procedure Laterality Date   • CATARACT EXTRACTION, BILATERAL         Home Medications:   Medications Prior to Admission   Medication Sig Dispense Refill Last Dose   • acetaminophen (TYLENOL) 325 MG tablet Take 2 tablets by mouth every 4 (Four) hours as needed for mild pain or fever. 30 tablet 0    • amLODIPine (NORVASC) 5 MG tablet Take 1 tablet by mouth Daily. 30 tablet 0 1/18/2020 at Unknown time   • aspirin 81 MG EC tablet Take 1 tablet by mouth daily. 30 tablet 0 1/18/2020 at Unknown time   • bisacodyl (DULCOLAX) 5 MG EC tablet Take 2 tablets by mouth daily as needed for constipation. 30 tablet 0    • brimonidine (ALPHAGAN) 0.2 % ophthalmic solution Administer 1 drop to both  eyes 3 (Three) Times a Day.   More than a month at Unknown time   • dextrose (GLUTOSE) 40 % gel Take contents of one tube by mouth every 15 (Fifteen) minutes as needed for Low Blood Sugar (Blood sugar less than 70). 37.5 g 0 1/18/2020 at Unknown time   • docusate sodium 100 MG capsule Take 1 capsule by mouth 2 (Two) times a day. 30 each 0 1/18/2020 at Unknown time   • furosemide (LASIX) 40 MG tablet Take 0.5 tablets by mouth Daily. (Patient taking differently: Take 40 mg by mouth Daily.)   1/18/2020 at Unknown time   • glipiZIDE (GLUCOTROL) 10 MG tablet Take 10 mg by mouth 2 (Two) Times a Day Before Meals.   1/18/2020 at Unknown time   • latanoprost (XALATAN) 0.005 % ophthalmic solution Administer 1 drop to both eyes Every Night.   Past Week at Unknown time   • lisinopril (PRINIVIL,ZESTRIL) 10 MG tablet Take 1 tablet by mouth Daily. 30 tablet 0 1/18/2020 at Unknown time   • metFORMIN (GLUCOPHAGE) 1000 MG tablet Take 1,000 mg by mouth 2 (Two) Times a Day With Meals.   1/18/2020 at Unknown time   • polyethylene glycol (MIRALAX) powder Mix 17 grams (1 capful in liquid) and take by mouth 2 (Two) times a day. 238 g 0 1/18/2020 at Unknown time   • potassium chloride (K-DUR) 10 MEQ CR tablet Take 1 tablet by mouth Daily. 30 tablet 0 1/18/2020 at Unknown time   • Skin Protectants, Misc. (EUCERIN) cream Apply  topically to the appropriate area as directed 2 (Two) Times a Day. Applied to bilateral lower extermities      • tamsulosin (FLOMAX) 0.4 MG capsule 24 hr capsule Take 1 capsule by mouth every night. 30 capsule 0 1/18/2020 at Unknown time       Inpatient Medications:  Scheduled Meds:  sodium chloride 10 mL Intravenous Q12H     Continuous Infusions:  lactated ringers 100 mL/hr Last Rate: 100 mL/hr (01/19/20 0603)     PRN Meds:.•  acetaminophen **OR** acetaminophen **OR** acetaminophen  •  nitroglycerin  •  ondansetron  •  [COMPLETED] Insert peripheral IV **AND** sodium chloride  •  sodium chloride    Allergies:  No Known  Allergies    Past Social History:  Social History     Socioeconomic History   • Marital status:      Spouse name: Not on file   • Number of children: Not on file   • Years of education: Not on file   • Highest education level: Not on file   Tobacco Use   • Smoking status: Never Smoker   • Smokeless tobacco: Never Used   Substance and Sexual Activity   • Alcohol use: No     Frequency: Never   • Drug use: No   • Sexual activity: Defer       Past Family History:  No family history on file.    Objective:   Temp:  [97.4 °F (36.3 °C)] 97.4 °F (36.3 °C)  Heart Rate:  [66-90] 72  Resp:  [20] 20  BP: (141-160)/(72-74) 160/74   SpO2:  [96 %] 96 %  on    Device (Oxygen Therapy): room air    Intake/Output Summary (Last 24 hours) at 1/19/2020 0742  Last data filed at 1/19/2020 0739  Gross per 24 hour   Intake 600 ml   Output 1050 ml   Net -450 ml     Body mass index is 27.79 kg/m².      01/18/20  2348   Weight: 75.8 kg (167 lb)     Weight change:     Physical Exam   Constitutional: He appears well-developed and well-nourished. No distress.   NG present to low wall suction with brown output noted   HENT:   Head: Normocephalic and atraumatic.   Eyes: Pupils are equal, round, and reactive to light. Conjunctivae are normal. No scleral icterus.   Neck: Neck supple. JVD present. No tracheal deviation present.   Cardiovascular: Normal rate and regular rhythm.   Murmur (3/6 holosystolic) heard.  Pulmonary/Chest: Effort normal. He has no wheezes. He has no rales.   Diminished bases with poor inspiratory effort   Abdominal: Soft. He exhibits distension. There is no tenderness. There is no rebound. Guarding: mild, but soft.   Hypoactive bowel sounds   Musculoskeletal: He exhibits edema (trace BLE).   Neurological: He is alert.   Skin: Skin is warm and dry. He is not diaphoretic.   Psychiatric: He has a normal mood and affect. His behavior is normal.   Nursing note and vitals reviewed.       Lab Review:     Results from last 7 days    Lab Units 01/18/20  2350 01/15/20  1838 01/13/20  0430   SODIUM mmol/L 148* 134* 132*   POTASSIUM mmol/L 4.5 4.3 4.4   CHLORIDE mmol/L 107 98 100   CO2 mmol/L 29.4* 27.3 25.8   BUN mg/dL 25* 18 14   CREATININE mg/dL 1.00 0.76 0.64*   GLUCOSE mg/dL 97 153* 119*   CALCIUM mg/dL 9.6 9.1 9.2   AST (SGOT) U/L 25  --   --    ALT (SGPT) U/L 16  --   --      Estimated Creatinine Clearance: 56.9 mL/min (by C-G formula based on SCr of 1 mg/dL).      Results from last 7 days   Lab Units 01/19/20  0612 01/19/20  0244 01/19/20  0200 01/16/20  1623 01/16/20  1132 01/16/20  0627 01/15/20  2240 01/15/20  2058   GLUCOSE mg/dL 87 134* 64* 197* 124 115 137* 153*     Results from last 7 days   Lab Units 01/18/20  2350   TROPONIN T ng/mL 0.064*                   Invalid input(s):  PHOS      Results from last 7 days   Lab Units 01/18/20  2350 01/15/20  1556 01/13/20  0430   WBC 10*3/mm3 12.84* 8.25 8.27   HEMOGLOBIN g/dL 9.9* 10.1* 9.7*   HEMATOCRIT % 30.0* 30.8* 29.4*   PLATELETS 10*3/mm3 354 302 287   MCV fL 88.8 88.8 89.4   MCH pg 29.3 29.1 29.5   MCHC g/dL 33.0 32.8 33.0   RDW % 13.4 12.8 12.5   RDW-SD fl 43.4 41.1 40.2   MPV fL 8.9 9.0 9.6   NEUTROPHIL % % 79.8*  --  68.1   LYMPHOCYTE % % 11.4*  --  16.2*   MONOCYTES % % 7.0  --  9.9   EOSINOPHIL % % 0.8  --  4.7   BASOPHIL % % 0.5  --  0.5   IMM GRAN % % 0.5  --  0.6*   NEUTROS ABS 10*3/mm3 10.25*  --  5.63   LYMPHS ABS 10*3/mm3 1.46  --  1.34   MONOS ABS 10*3/mm3 0.90  --  0.82   EOS ABS 10*3/mm3 0.10  --  0.39   BASOS ABS 10*3/mm3 0.07  --  0.04   IMMATURE GRANS (ABS) 10*3/mm3 0.06*  --  0.05   NRBC /100 WBC 0.0  --  0.0           Results from last 7 days   Lab Units 01/19/20  0409 01/18/20  2353 01/18/20  2350   PROCALCITONIN ng/mL  --   --  0.14   LACTATE mmol/L 1.7 2.9*  --          Results from last 7 days   Lab Units 01/18/20  2350   LIPASE U/L 40         Results from last 7 days   Lab Units 01/19/20  0031   NITRITE UA  Negative   WBC UA /HPF 13-20*   BACTERIA UA /HPF None  Seen   SQUAM EPITHEL UA /HPF 0-2               Imaging:  Imaging Results (Last 24 Hours)     Procedure Component Value Units Date/Time    CT Abdomen Pelvis With Contrast [[id number]] Collected:  01/19/20 0114     Updated:  01/19/20 0126    Narrative:       CT OF THE ABDOMEN AND PELVIS WITH CONTRAST     HISTORY: Abdominal pain     COMPARISON: 01/10/2020     TECHNIQUE: Axial CT imaging was obtained from the hemidiaphragms to  symphysis pubis. IV contrast was administered.     FINDINGS:  Images through the lung bases demonstrate bibasilar consolidation. This  was also present on prior exam, but may have worsened slightly.  Bilateral pleural effusions are present. Right pleural effusion appears  larger. The patient is noted to have marked distention of the stomach.  Exact etiology is uncertain. It may represent nonspecific  gastroparesis/gastric ileus. There is also a dilated and patulous  appearance to the esophagus as well. The liver is unremarkable, other  than some periportal edema. The patient does have cholelithiasis. Spleen  is unremarkable. The adrenal glands are normal. Pancreas is atrophic.  There is no hydronephrosis. Nonobstructing stones are seen within both  kidneys. There are bilateral renal cysts. There is no evidence of  mechanical small bowel obstruction. Urinary bladder appears  unremarkable. Dystrophic calcifications are seen within the prostate  gland. Large volume of fecal material is seen throughout the colon,  suggesting constipation. The appendix is within normal limits. There is  calcification of the abdominal aorta. There is diffuse body wall edema.  There is also mesenteric edema. No acute osseous abnormalities are seen.       Impression:          1. Marked distention of the stomach. Duodenum appears unremarkable, and  there is no evidence of bowel obstruction. Findings may reflect  gastroparesis/gastric ileus. There is patulous and fluid-filled  appearance to the distal esophagus, favored to  be secondary to stasis  within the stomach. Patient may benefit from nasogastric decompression.  While there is no convincing evidence of mechanical small bowel  obstruction on the patient does have a large volume of fecal material  throughout the colon, suggesting constipation.  2. Slight interval increase in bibasilar consolidation. This may  represent atelectasis, although pneumonia is not excluded. Correlation  with clinical presentation is recommended. Persistent bilateral pleural  effusions are noted.     Radiation dose reduction techniques were utilized, including automated  exposure control and exposure modulation based on body size.     This report was finalized on 1/19/2020 1:23 AM by Dr. Kitty Moran M.D.       XR Chest 1 View [[id number]] Collected:  01/19/20 0030     Updated:  01/19/20 0036    Narrative:       PORTABLE CHEST RADIOGRAPH     HISTORY: Weakness     COMPARISON: January 2020     FINDINGS:  Cardiomegaly is identified. There is vascular congestion. This is new  when compared to prior exam. Worsening atelectasis is noted at the lung  bases. No pneumothorax is seen. Massive gaseous distention of bowel  within the upper abdomen is seen. This is new when compared to prior  exam. Dedicated imaging of the abdomen is suggested.       Impression:          1. Cardiomegaly and vascular congestion, new when compared to prior  exam.  2. Worsening bibasilar atelectasis.  3. Marked gaseous distention of bowel seen within the upper abdomen.     This report was finalized on 1/19/2020 12:33 AM by Dr. Kitty Moran M.D.             EKG:  ECG 12 Lead   Final Result   HEART RATE= 72  bpm   RR Interval= 840  ms   WV Interval= 92  ms   P Horizontal Axis= -61  deg   P Front Axis= 268  deg   QRSD Interval= 95  ms   QT Interval= 573  ms   QRS Axis= 5  deg   T Wave Axis=   deg   - ABNORMAL ECG -   Low voltage, extremity leads   Prolonged QT interval   Atrial flutter   NO SIGNIFICANT CHANGE FROM PREVIOUS ECG    Electronically Signed By: Ted Randolph ([facility]) 19-Jan-2020 06:25:51   Date and Time of Study: 2020-01-19 04:02:00      ECG 12 Lead   Final Result   HEART RATE= 74  bpm   RR Interval= 810  ms   MD Interval=   ms   P Horizontal Axis=   deg   P Front Axis=   deg   QRSD Interval= 98  ms   QT Interval= 409  ms   QRS Axis= 11  deg   T Wave Axis= 52  deg   - ABNORMAL ECG -   Atrial flutter with predominant 4:1 AV block probab;y new vs previous ecg   Probable anteroseptal infarct, recent   Electronically Signed By: Ted Randolph ([facility]) 19-Jan-2020 06:26:17   Date and Time of Study: 2020-01-18 23:49:20          I reviewed the patient's new clinical results and medications.  I reviewed the patient's new imaging results and agree with the interpretation.  I personally viewed and interpreted the patient's EKG/Telemetry data.      Problem List:     Active Hospital Problems    Diagnosis  POA   • Acute abdominal pain [R10.9]  Yes   • Atrial flutter (CMS/HCC) [I48.92]  Unknown   • Hypernatremia [E87.0]  Unknown   • Pleural effusion [J90]  Unknown   • Hypoglycemia [E16.2]  Unknown   • Constipation [K59.00]  Yes   • Sepsis without acute organ dysfunction (CMS/HCC) [A41.9]  Unknown   • Elevated troponin [R79.89]  Yes   • Aortic stenosis [I35.0]  Yes   • HTN (hypertension) [I10]  Yes   • Type 2 diabetes mellitus, without long-term current use of insulin (CMS/HCC) [E11.9]  Yes      Resolved Hospital Problems   No resolved problems to display.       Assessment and Plan:     Patient is a 79-year-old male with history of aortic stenosis, HTN, diabetes with recent admission for E. coli bacteremia due to UTI.  Patient was admitted for hypoglycemia and abdominal pain with possible gastroparesis or gastric ileus.       Acute abdominal pain/gastris distention/Constipation  -CT reviewed. Given significant stool burden raises concern for ileus as cause of distention.   - GI consulted per night APRN and defer adding Reglan to them  -  discussed with Dr. Lee and will consult general surgery to help with NG tube management and possible underlying ileus  -I recommended that patient have an enema, but patient refused and agreed to try suppository first.  -resume oral constipations meds once able to take PO      Sepsis without acute organ dysfunction (CMS/HCC)  -likely due to abdominal infection with possible ileus, but also cannot rule out pneumonia (likely aspiration given ?recent vomiting). Given borderline procal, lactic acidosis and increase in WBC will start antibiotics.   -continue IVF, but reduce to 75 ml/hr  - send blood cultures then start IV zosyn      Atrial flutter (CMS/HCC)/ AS/ elevated troponin  -Rate controlled flutter noted on EKG and new from prior EKG with no obvious ischemia.  QT was prolonged. Denies any chest pain. Troponin 0.064 (similar to prior earlier this month) and trended down to 0.056. Prior echo from 5/2019 showed EF 73% and moderate AS.   -Consult cardiology and avoid QT prolongation medication      Hypernatremia  -Likely from decreased oral intake.  Improved to 146 on repeat labs this morning.   -Continue IV fluids and switch to D5       Pleural effusion  -bilateral noted on CT. No hypoxia. Will monitor      Type 2 diabetes mellitus, without long-term current use of insulin/ hypoglycemia  -hypoglycemia likely related to decreased oral intake and on sulfonylurea.  -Hold oral hypoglycemics  -Monitor glucose and treat with PRN sliding scale if starts to increase  -D5 as per above      HTN (hypertension)  -BP currently stable.  Oral meds on hold due to n.p.o. Status.  -PRN hydralazine for systolic greater than 180    Admit to tele  DVT prophylaxis: lovenox 40 daily  Code status addressed: Full code  Diet: NPO  Consult CCP to help with DC planning    I discussed the patients findings and my recommendations with patient.  Attempted to use  phone services, the patient put the phone down and stated that he did  not need it he understands English well.      COMPA Claudio  [facility]  01/19/20  7:42 AM    Dictated utilizing Dragon dictation

## 2020-01-19 NOTE — ED TRIAGE NOTES
"Pt from NH via [facility] for hypoglycemia.  Per facility Pt has been hypoglycemic since 1600 today. Facility reports per their MD's orders Pt received 2 oral glucose, 1 bag of D5 and food with no improvement of BG. EMS reports BG 23 at their arrival.    EMS gave 1 amp D50 en route with  at triage arrival    Pt is [place] speaking and reports abdominal pain for \"a couple of days\" and nausea. Pt denied vomiting or diarrhea while at NH but began to throw up in triage  Assessment performed in [place] by this RN  "

## 2020-01-20 ENCOUNTER — APPOINTMENT (OUTPATIENT)
Dept: GENERAL RADIOLOGY | Facility: HOSPITAL | Age: 80
End: 2020-01-20

## 2020-01-20 PROBLEM — J69.0 ASPIRATION PNEUMONIA (HCC): Status: ACTIVE | Noted: 2020-01-20

## 2020-01-20 LAB
ALBUMIN SERPL-MCNC: 2.3 G/DL (ref 3.5–5.2)
ALBUMIN/GLOB SERPL: 0.8 G/DL
ALP SERPL-CCNC: 126 U/L (ref 39–117)
ALT SERPL W P-5'-P-CCNC: 9 U/L (ref 1–41)
ANION GAP SERPL CALCULATED.3IONS-SCNC: 8.7 MMOL/L (ref 5–15)
AORTIC DIMENSIONLESS INDEX: 0.4 (DI)
AST SERPL-CCNC: 17 U/L (ref 1–40)
BACTERIA SPEC AEROBE CULT: NO GROWTH
BH CV ECHO MEAS - ACS: 1.1 CM
BH CV ECHO MEAS - AO MAX PG (FULL): 49.4 MMHG
BH CV ECHO MEAS - AO MAX PG: 54.5 MMHG
BH CV ECHO MEAS - AO MEAN PG (FULL): 24 MMHG
BH CV ECHO MEAS - AO MEAN PG: 27 MMHG
BH CV ECHO MEAS - AO ROOT AREA (BSA CORRECTED): 1.5
BH CV ECHO MEAS - AO ROOT AREA: 5.7 CM^2
BH CV ECHO MEAS - AO ROOT DIAM: 2.7 CM
BH CV ECHO MEAS - AO V2 MAX: 369 CM/SEC
BH CV ECHO MEAS - AO V2 MEAN: 245 CM/SEC
BH CV ECHO MEAS - AO V2 VTI: 77.8 CM
BH CV ECHO MEAS - AVA(I,A): 1.2 CM^2
BH CV ECHO MEAS - AVA(I,D): 1.2 CM^2
BH CV ECHO MEAS - AVA(V,A): 1.1 CM^2
BH CV ECHO MEAS - AVA(V,D): 1.1 CM^2
BH CV ECHO MEAS - BSA(HAYCOCK): 1.9 M^2
BH CV ECHO MEAS - BSA: 1.8 M^2
BH CV ECHO MEAS - BZI_BMI: 27.9 KILOGRAMS/M^2
BH CV ECHO MEAS - BZI_METRIC_HEIGHT: 165 CM
BH CV ECHO MEAS - BZI_METRIC_WEIGHT: 76 KG
BH CV ECHO MEAS - EDV(CUBED): 97.3 ML
BH CV ECHO MEAS - EDV(MOD-SP2): 33 ML
BH CV ECHO MEAS - EDV(MOD-SP4): 50 ML
BH CV ECHO MEAS - EDV(TEICH): 97.3 ML
BH CV ECHO MEAS - EF(CUBED): 72.3 %
BH CV ECHO MEAS - EF(MOD-BP): 64 %
BH CV ECHO MEAS - EF(MOD-SP2): 66.7 %
BH CV ECHO MEAS - EF(MOD-SP4): 64 %
BH CV ECHO MEAS - EF(TEICH): 64 %
BH CV ECHO MEAS - ESV(CUBED): 27 ML
BH CV ECHO MEAS - ESV(MOD-SP2): 11 ML
BH CV ECHO MEAS - ESV(MOD-SP4): 18 ML
BH CV ECHO MEAS - ESV(TEICH): 35 ML
BH CV ECHO MEAS - FS: 34.8 %
BH CV ECHO MEAS - IVS/LVPW: 1
BH CV ECHO MEAS - IVSD: 1.1 CM
BH CV ECHO MEAS - LAT PEAK E' VEL: 8.7 CM/SEC
BH CV ECHO MEAS - LV DIASTOLIC VOL/BSA (35-75): 27.3 ML/M^2
BH CV ECHO MEAS - LV MASS(C)D: 181.2 GRAMS
BH CV ECHO MEAS - LV MASS(C)DI: 98.8 GRAMS/M^2
BH CV ECHO MEAS - LV MAX PG: 5.1 MMHG
BH CV ECHO MEAS - LV MEAN PG: 3 MMHG
BH CV ECHO MEAS - LV SYSTOLIC VOL/BSA (12-30): 9.8 ML/M^2
BH CV ECHO MEAS - LV V1 MAX: 113 CM/SEC
BH CV ECHO MEAS - LV V1 MEAN: 83.9 CM/SEC
BH CV ECHO MEAS - LV V1 VTI: 25.9 CM
BH CV ECHO MEAS - LVIDD: 4.6 CM
BH CV ECHO MEAS - LVIDS: 3 CM
BH CV ECHO MEAS - LVLD AP2: 6.8 CM
BH CV ECHO MEAS - LVLD AP4: 7.1 CM
BH CV ECHO MEAS - LVLS AP2: 6.5 CM
BH CV ECHO MEAS - LVLS AP4: 5.8 CM
BH CV ECHO MEAS - LVOT AREA (M): 3.5 CM^2
BH CV ECHO MEAS - LVOT AREA: 3.5 CM^2
BH CV ECHO MEAS - LVOT DIAM: 2.1 CM
BH CV ECHO MEAS - LVPWD: 1.1 CM
BH CV ECHO MEAS - MED PEAK E' VEL: 10.8 CM/SEC
BH CV ECHO MEAS - MV A DUR: 92 SEC
BH CV ECHO MEAS - MV A MAX VEL: 82.9 CM/SEC
BH CV ECHO MEAS - MV DEC SLOPE: 654 CM/SEC^2
BH CV ECHO MEAS - MV DEC TIME: 92 SEC
BH CV ECHO MEAS - MV E MAX VEL: 63.1 CM/SEC
BH CV ECHO MEAS - MV E/A: 0.76
BH CV ECHO MEAS - MV MAX PG: 4.7 MMHG
BH CV ECHO MEAS - MV MEAN PG: 2 MMHG
BH CV ECHO MEAS - MV P1/2T MAX VEL: 115 CM/SEC
BH CV ECHO MEAS - MV P1/2T: 51.5 MSEC
BH CV ECHO MEAS - MV V2 MAX: 108 CM/SEC
BH CV ECHO MEAS - MV V2 MEAN: 65.2 CM/SEC
BH CV ECHO MEAS - MV V2 VTI: 29 CM
BH CV ECHO MEAS - MVA P1/2T LCG: 1.9 CM^2
BH CV ECHO MEAS - MVA(P1/2T): 4.3 CM^2
BH CV ECHO MEAS - MVA(VTI): 3.1 CM^2
BH CV ECHO MEAS - PA ACC TIME: 0.13 SEC
BH CV ECHO MEAS - PA MAX PG (FULL): 1.7 MMHG
BH CV ECHO MEAS - PA MAX PG: 5.2 MMHG
BH CV ECHO MEAS - PA PR(ACCEL): 19.6 MMHG
BH CV ECHO MEAS - PA V2 MAX: 114 CM/SEC
BH CV ECHO MEAS - RV MAX PG: 3.5 MMHG
BH CV ECHO MEAS - RV MEAN PG: 2 MMHG
BH CV ECHO MEAS - RV V1 MAX: 93.1 CM/SEC
BH CV ECHO MEAS - RV V1 MEAN: 58.5 CM/SEC
BH CV ECHO MEAS - RV V1 VTI: 18.5 CM
BH CV ECHO MEAS - SI(AO): 242.9 ML/M^2
BH CV ECHO MEAS - SI(CUBED): 38.4 ML/M^2
BH CV ECHO MEAS - SI(LVOT): 48.9 ML/M^2
BH CV ECHO MEAS - SI(MOD-SP2): 12 ML/M^2
BH CV ECHO MEAS - SI(MOD-SP4): 17.4 ML/M^2
BH CV ECHO MEAS - SI(TEICH): 34 ML/M^2
BH CV ECHO MEAS - SV(AO): 445.4 ML
BH CV ECHO MEAS - SV(CUBED): 70.3 ML
BH CV ECHO MEAS - SV(LVOT): 89.7 ML
BH CV ECHO MEAS - SV(MOD-SP2): 22 ML
BH CV ECHO MEAS - SV(MOD-SP4): 32 ML
BH CV ECHO MEAS - SV(TEICH): 62.3 ML
BH CV ECHO MEAS - TAPSE (>1.6): 1.9 CM
BH CV ECHO MEAS - TR MAX VEL: 224 CM/SEC
BH CV ECHO MEASUREMENTS AVERAGE E/E' RATIO: 6.47
BH CV XLRA - RV BASE: 2.9 CM
BH CV XLRA - TDI S': 11.5 CM/SEC
BILIRUB SERPL-MCNC: 0.3 MG/DL (ref 0.2–1.2)
BUN BLD-MCNC: 17 MG/DL (ref 8–23)
BUN/CREAT SERPL: 20.5 (ref 7–25)
CALCIUM SPEC-SCNC: 8.8 MG/DL (ref 8.6–10.5)
CHLORIDE SERPL-SCNC: 102 MMOL/L (ref 98–107)
CO2 SERPL-SCNC: 28.3 MMOL/L (ref 22–29)
CREAT BLD-MCNC: 0.83 MG/DL (ref 0.76–1.27)
DEPRECATED RDW RBC AUTO: 43.5 FL (ref 37–54)
ERYTHROCYTE [DISTWIDTH] IN BLOOD BY AUTOMATED COUNT: 13.2 % (ref 12.3–15.4)
GFR SERPL CREATININE-BSD FRML MDRD: 108 ML/MIN/1.73
GFR SERPL CREATININE-BSD FRML MDRD: 89 ML/MIN/1.73
GLOBULIN UR ELPH-MCNC: 2.8 GM/DL
GLUCOSE BLD-MCNC: 102 MG/DL (ref 65–99)
GLUCOSE BLDC GLUCOMTR-MCNC: 102 MG/DL (ref 70–130)
GLUCOSE BLDC GLUCOMTR-MCNC: 113 MG/DL (ref 70–130)
GLUCOSE BLDC GLUCOMTR-MCNC: 136 MG/DL (ref 70–130)
GLUCOSE BLDC GLUCOMTR-MCNC: 96 MG/DL (ref 70–130)
HCT VFR BLD AUTO: 26.5 % (ref 37.5–51)
HGB BLD-MCNC: 8.7 G/DL (ref 13–17.7)
LEFT ATRIUM VOLUME INDEX: 27 ML/M2
MAXIMAL PREDICTED HEART RATE: 141 BPM
MCH RBC QN AUTO: 29.8 PG (ref 26.6–33)
MCHC RBC AUTO-ENTMCNC: 32.8 G/DL (ref 31.5–35.7)
MCV RBC AUTO: 90.8 FL (ref 79–97)
PLATELET # BLD AUTO: 324 10*3/MM3 (ref 140–450)
PMV BLD AUTO: 8.8 FL (ref 6–12)
POTASSIUM BLD-SCNC: 4.2 MMOL/L (ref 3.5–5.2)
PROT SERPL-MCNC: 5.1 G/DL (ref 6–8.5)
RBC # BLD AUTO: 2.92 10*6/MM3 (ref 4.14–5.8)
SODIUM BLD-SCNC: 139 MMOL/L (ref 136–145)
STRESS TARGET HR: 120 BPM
WBC NRBC COR # BLD: 9.49 10*3/MM3 (ref 3.4–10.8)

## 2020-01-20 PROCEDURE — 97162 PT EVAL MOD COMPLEX 30 MIN: CPT

## 2020-01-20 PROCEDURE — 97530 THERAPEUTIC ACTIVITIES: CPT

## 2020-01-20 PROCEDURE — 82962 GLUCOSE BLOOD TEST: CPT

## 2020-01-20 PROCEDURE — 25010000002 PIPERACILLIN SOD-TAZOBACTAM PER 1 G: Performed by: NURSE PRACTITIONER

## 2020-01-20 PROCEDURE — 85027 COMPLETE CBC AUTOMATED: CPT | Performed by: NURSE PRACTITIONER

## 2020-01-20 PROCEDURE — 80053 COMPREHEN METABOLIC PANEL: CPT | Performed by: HOSPITALIST

## 2020-01-20 PROCEDURE — 99232 SBSQ HOSP IP/OBS MODERATE 35: CPT | Performed by: INTERNAL MEDICINE

## 2020-01-20 PROCEDURE — 25010000002 METOCLOPRAMIDE PER 10 MG: Performed by: INTERNAL MEDICINE

## 2020-01-20 PROCEDURE — 99231 SBSQ HOSP IP/OBS SF/LOW 25: CPT | Performed by: SURGERY

## 2020-01-20 PROCEDURE — 25010000002 ENOXAPARIN PER 10 MG: Performed by: NURSE PRACTITIONER

## 2020-01-20 PROCEDURE — 74018 RADEX ABDOMEN 1 VIEW: CPT

## 2020-01-20 PROCEDURE — 99222 1ST HOSP IP/OBS MODERATE 55: CPT | Performed by: INTERNAL MEDICINE

## 2020-01-20 RX ORDER — DEXTROSE AND SODIUM CHLORIDE 5; .45 G/100ML; G/100ML
75 INJECTION, SOLUTION INTRAVENOUS CONTINUOUS
Status: DISCONTINUED | OUTPATIENT
Start: 2020-01-20 | End: 2020-01-23

## 2020-01-20 RX ADMIN — LATANOPROST 1 DROP: 50 SOLUTION OPHTHALMIC at 21:04

## 2020-01-20 RX ADMIN — METOCLOPRAMIDE 10 MG: 5 INJECTION, SOLUTION INTRAMUSCULAR; INTRAVENOUS at 10:19

## 2020-01-20 RX ADMIN — TAZOBACTAM SODIUM AND PIPERACILLIN SODIUM 3.38 G: 375; 3 INJECTION, SOLUTION INTRAVENOUS at 02:50

## 2020-01-20 RX ADMIN — ASPIRIN 81 MG: 81 TABLET, COATED ORAL at 08:50

## 2020-01-20 RX ADMIN — Medication: at 21:05

## 2020-01-20 RX ADMIN — BRIMONIDINE TARTRATE 1 DROP: 2 SOLUTION OPHTHALMIC at 08:50

## 2020-01-20 RX ADMIN — TAZOBACTAM SODIUM AND PIPERACILLIN SODIUM 3.38 G: 375; 3 INJECTION, SOLUTION INTRAVENOUS at 10:19

## 2020-01-20 RX ADMIN — GLYCERIN 7.5 ML: 5.4 LIQUID RECTAL at 19:30

## 2020-01-20 RX ADMIN — Medication: at 08:50

## 2020-01-20 RX ADMIN — TAMSULOSIN HYDROCHLORIDE 0.4 MG: 0.4 CAPSULE ORAL at 21:04

## 2020-01-20 RX ADMIN — DOCUSATE SODIUM 100 MG: 100 CAPSULE, LIQUID FILLED ORAL at 08:50

## 2020-01-20 RX ADMIN — BISACODYL 10 MG: 5 TABLET ORAL at 17:33

## 2020-01-20 RX ADMIN — METOCLOPRAMIDE 10 MG: 5 INJECTION, SOLUTION INTRAMUSCULAR; INTRAVENOUS at 23:14

## 2020-01-20 RX ADMIN — METOCLOPRAMIDE 10 MG: 5 INJECTION, SOLUTION INTRAMUSCULAR; INTRAVENOUS at 05:30

## 2020-01-20 RX ADMIN — BRIMONIDINE TARTRATE 1 DROP: 2 SOLUTION OPHTHALMIC at 21:04

## 2020-01-20 RX ADMIN — POLYETHYLENE GLYCOL 3350 17 G: 17 POWDER, FOR SOLUTION ORAL at 08:50

## 2020-01-20 RX ADMIN — ENOXAPARIN SODIUM 40 MG: 40 INJECTION SUBCUTANEOUS at 14:02

## 2020-01-20 RX ADMIN — DEXTROSE AND SODIUM CHLORIDE 100 ML/HR: 5; 450 INJECTION, SOLUTION INTRAVENOUS at 10:23

## 2020-01-20 RX ADMIN — DEXTROSE AND SODIUM CHLORIDE 75 ML/HR: 5; 450 INJECTION, SOLUTION INTRAVENOUS at 23:14

## 2020-01-20 RX ADMIN — POLYETHYLENE GLYCOL 3350 17 G: 17 POWDER, FOR SOLUTION ORAL at 21:04

## 2020-01-20 RX ADMIN — SODIUM CHLORIDE, PRESERVATIVE FREE 10 ML: 5 INJECTION INTRAVENOUS at 21:05

## 2020-01-20 RX ADMIN — SODIUM CHLORIDE, PRESERVATIVE FREE 10 ML: 5 INJECTION INTRAVENOUS at 08:50

## 2020-01-20 RX ADMIN — METOCLOPRAMIDE 10 MG: 5 INJECTION, SOLUTION INTRAMUSCULAR; INTRAVENOUS at 16:21

## 2020-01-20 RX ADMIN — BRIMONIDINE TARTRATE 1 DROP: 2 SOLUTION OPHTHALMIC at 16:21

## 2020-01-20 RX ADMIN — DOCUSATE SODIUM 100 MG: 100 CAPSULE, LIQUID FILLED ORAL at 21:04

## 2020-01-20 RX ADMIN — TAZOBACTAM SODIUM AND PIPERACILLIN SODIUM 3.38 G: 375; 3 INJECTION, SOLUTION INTRAVENOUS at 17:59

## 2020-01-20 NOTE — PLAN OF CARE
"  Problem: Patient Care Overview  Goal: Plan of Care Review  Outcome: Ongoing (interventions implemented as appropriate)  Flowsheets (Taken 1/20/2020 1133)  Plan of Care Reviewed With: patient  Note:   Pt is a 80 yo M admitted from SNU for blood glucose of 23 with acute abdominal pain. Pt does not like to answer questions, however based on H&P prior to last hospitalization he was living with spouse. PLOF is unknown fully but appears to be a community ambulator. Pt reported \"2\" when questioned on how many steps into his home and \"no\" when questioned on and prior difficulty with those steps. Today patient denied any pain, was Demetra for all bed mobility and CGA/Demetra for sit<>stand with no change in vitals. Pt refused additional gait training or transfer to chair. Pt has little motivation and requires extensive motivation for participation. Skilled PT needed to address above impairments. DC recs include SNU/[facility].     "

## 2020-01-20 NOTE — PROGRESS NOTES
Progress Note    Name: Raymon Solis ADMIT: 2020   : 1940  PCP: Magali Burgess MD    MRN: [id number] LOS: 1 days   AGE/SEX: 79 y.o. male  ROOM: E652/1   Date of Encounter Visit: 20    Subjective:     Interval History: cardiology note reviewed. NG tube was removed. Overall feels better.     REVIEW OF SYSTEMS:   no fever or chills.  No chest pain, palpitations..   No shortness of breath or cough.   No nausea, vomiting or diarrhea. Small BM yesterday.     Objective:   Temp:  [97.9 °F (36.6 °C)-98.3 °F (36.8 °C)] 98 °F (36.7 °C)  Heart Rate:  [68-73] 69  Resp:  [16-18] 16  BP: (123-142)/(66-72) 142/72   SpO2:  [94 %-98 %] 94 %  on    Device (Oxygen Therapy): room air    Intake/Output Summary (Last 24 hours) at 2020 1142  Last data filed at 2020 0635  Gross per 24 hour   Intake 825 ml   Output 475 ml   Net 350 ml     Body mass index is 27.92 kg/m².      20  2348 20  1707   Weight: 75.8 kg (167 lb) 76 kg (167 lb 8.8 oz)     Weight change: 0.249 kg (8.8 oz)    Physical Exam   Constitutional: He appears well-developed. No distress.   HENT:   Head: Atraumatic.   Nose: Nose normal.   Eyes: Conjunctivae are normal.   Neck: Neck supple. No tracheal deviation present.   Cardiovascular: Normal rate. An irregularly irregular rhythm present.   Murmur heard.  Pulmonary/Chest: Effort normal. He has no wheezes.   Diminished bases   Abdominal: Soft. Bowel sounds are normal. He exhibits no distension. There is tenderness (epigastric). There is no guarding.   Musculoskeletal: He exhibits edema (1+ BLE).   Neurological: He is alert.   Skin: Skin is warm and dry.   Nursing note and vitals reviewed.      Results Review:      Results from last 7 days   Lab Units 20  1023 20  0750 20  2350 01/15/20  1838   SODIUM mmol/L 139 146* 148* 134*   POTASSIUM mmol/L 4.2 4.4 4.5 4.3   CHLORIDE mmol/L 102 106 107 98   CO2 mmol/L 28.3 29.3* 29.4* 27.3   BUN mg/dL 17 23 25* 18   CREATININE  mg/dL 0.83 0.88 1.00 0.76   GLUCOSE mg/dL 102* 84 97 153*   CALCIUM mg/dL 8.8 9.6 9.6 9.1   AST (SGOT) U/L 17  --  25  --    ALT (SGPT) U/L 9  --  16  --      Estimated Creatinine Clearance: 68.6 mL/min (by C-G formula based on SCr of 0.83 mg/dL).      Results from last 7 days   Lab Units 01/20/20  1121 01/20/20  0605 01/19/20  2019 01/19/20  1758 01/19/20  1446 01/19/20  1143 01/19/20  1012 01/19/20  0612   GLUCOSE mg/dL 113 96 80 84 81 69* 87 87     Results from last 7 days   Lab Units 01/19/20  0750 01/18/20  2350   TROPONIN T ng/mL 0.056* 0.064*                   Invalid input(s):  PHOS        Invalid input(s): LDLCALC  Results from last 7 days   Lab Units 01/19/20  0750 01/18/20  2350  01/15/20  1556   WBC 10*3/mm3 19.16* 12.84*  --  8.25   HEMOGLOBIN g/dL 10.0* 9.9*  --  10.1*   HEMATOCRIT % 29.8* 30.0*  --  30.8*   PLATELETS 10*3/mm3 364 354  --  302   MCV fL 87.6 88.8  --  88.8   MCH pg 29.4 29.3  --  29.1   MCHC g/dL 33.6 33.0  --  32.8   RDW % 13.3 13.4  --  12.8   RDW-SD fl 41.6 43.4  --  41.1   MPV fL 8.7 8.9  --  9.0   NEUTROPHIL % % 86.1* 79.8*   < >  --    LYMPHOCYTE % % 6.6* 11.4*   < >  --    MONOCYTES % % 5.8 7.0   < >  --    EOSINOPHIL % % 0.4 0.8   < >  --    BASOPHIL % % 0.4 0.5   < >  --    IMM GRAN % % 0.7* 0.5   < >  --    NEUTROS ABS 10*3/mm3 16.51* 10.25*   < >  --    LYMPHS ABS 10*3/mm3 1.26 1.46   < >  --    MONOS ABS 10*3/mm3 1.11* 0.90   < >  --    EOS ABS 10*3/mm3 0.07 0.10   < >  --    BASOS ABS 10*3/mm3 0.07 0.07   < >  --    IMMATURE GRANS (ABS) 10*3/mm3 0.14* 0.06*   < >  --    NRBC /100 WBC 0.0 0.0   < >  --     < > = values in this interval not displayed.             Results from last 7 days   Lab Units 01/19/20  0409 01/18/20  2353 01/18/20  2350   PROCALCITONIN ng/mL  --   --  0.14   LACTATE mmol/L 1.7 2.9*  --          Results from last 7 days   Lab Units 01/18/20  2350   LIPASE U/L 40     Results from last 7 days   Lab Units 01/19/20  1239 01/19/20  1204 01/19/20  0031    BLOODCX  No growth at less than 24 hours No growth at less than 24 hours  --    URINECX   --   --  No growth         Results from last 7 days   Lab Units 01/19/20  0031   NITRITE UA  Negative   WBC UA /HPF 13-20*   BACTERIA UA /HPF None Seen   SQUAM EPITHEL UA /HPF 0-2   URINECX  No growth           Imaging:  Imaging Results (Last 24 Hours)     ** No results found for the last 24 hours. **          I reviewed the patient's new clinical results and medications.         Medication Review:   Scheduled Meds:  aspirin 81 mg Oral Daily   brimonidine 1 drop Both Eyes TID   docusate sodium 100 mg Oral BID   enoxaparin 40 mg Subcutaneous Q24H   hydrocerin  Topical BID   insulin lispro 0-7 Units Subcutaneous 4x Daily With Meals & Nightly   latanoprost 1 drop Both Eyes Nightly   metoclopramide 10 mg Intravenous Q6H   piperacillin-tazobactam 3.375 g Intravenous Q8H   polyethylene glycol 17 g Oral BID   sodium chloride 10 mL Intravenous Q12H   tamsulosin 0.4 mg Oral Nightly     Continuous Infusions:  dextrose 75 mL/hr Last Rate: 75 mL/hr (01/19/20 1345)   dextrose 5 % and sodium chloride 0.45 % 100 mL/hr Last Rate: 100 mL/hr (01/20/20 1023)     PRN Meds:.•  acetaminophen **OR** acetaminophen **OR** acetaminophen  •  bisacodyl  •  dextrose  •  dextrose  •  glucagon (human recombinant)  •  hydrALAZINE  •  Morphine  •  nitroglycerin  •  promethazine  •  [COMPLETED] Insert peripheral IV **AND** sodium chloride  •  sodium chloride    Problem List:     Active Hospital Problems    Diagnosis  POA   • Acute abdominal pain [R10.9]  Yes   • Atrial flutter (CMS/HCC) [I48.92]  Unknown   • Hypernatremia [E87.0]  Unknown   • Pleural effusion [J90]  Unknown   • Hypoglycemia [E16.2]  Unknown   • Gastric distention [K31.89]  Unknown   • Constipation [K59.00]  Yes   • Sepsis without acute organ dysfunction (CMS/HCC) [A41.9]  Unknown   • Elevated troponin [R79.89]  Yes   • Aortic stenosis [I35.0]  Yes   • HTN (hypertension) [I10]  Yes   • Type 2  diabetes mellitus, without long-term current use of insulin (CMS/HCC) [E11.9]  Yes      Resolved Hospital Problems   No resolved problems to display.       Assessment and Plan:     Patient is a 79-year-old male with history of aortic stenosis, HTN, diabetes with recent admission for E. coli bacteremia due to UTI.  Patient was admitted for hypoglycemia and abdominal pain with possible gastroparesis or gastric ileus.        Acute abdominal pain/gastris distention/Constipation  -NG tube removed. Appreciate GI and surgery input  -resume diet when ok with GI  - on reglan  -resume oral bowel regimen and continue PRN suppository or enema.        Sepsis without acute organ dysfunction (CMS/HCC)/ aspiration pneumonia  -likely due to abdominal infection with possible ileus vs aspiration pneumonia. NGTD on cultures  -continue IV zosyn  -continue IVF, but reduce to 75 ml/hr  - send blood cultures then start IV zosyn       Atrial flutter (CMS/HCC)/ AS/ elevated troponin  -Rate controlled. Troponin trended down and no further workup per cards  - moderate AS stable.   -may consider adding Eliquis 5 BID once GI issues improve and anemia stable.        Hypernatremia  -resolved       Pleural effusion  -bilateral noted on CT. No hypoxia. Will monitor       Type 2 diabetes mellitus, without long-term current use of insulin/ hypoglycemia  -hypoglycemia on admit likely related to decreased oral intake and on sulfonylurea.  -Hold oral hypoglycemics  -PRN SS insulin       HTN (hypertension)  -BP currently stable.  Oral meds on hold due to n.p.o. Status.  -PRN hydralazine for systolic greater than 180     VTE prophylaxis: Lovenox 40 mg SC daily  CODE status: full code  Disposition: TBD    I discussed the patients findings and my recommendations with patient.    Lisa Umanzor, APRN  01/20/20  11:42 AM

## 2020-01-20 NOTE — CONSULTS
Date of Hospital Visit: 2020  Encounter Provider: Tim Salas MD  Place of Service: [facility] CARDIOLOGY  Patient Name: Raymon Solis  :1940  Referral Provider: Javy Mccracken MD    Chief complaint: low BS    Consulted for: flutter, elevated Tn    History of Present Illness     Mr Raymon Solis is a 79 year old man with numerous chronic medical conditions who was admitted with hypoglycemia after a recent admission for UTI and bacteremia.     I saw him once before, in 2019.  His Tn was mildly elevated. I noted that his EKG showed no ischemic findings, and a wandering atrial pacemaker.  An echo the month before had revealed moderate AS.  I noted that his CK was normal and I did not recommend any further testing; I did recommend that he see me in the office but he did not keep that appointment.       He has been admitted several times since then.  This admission, he reportedly had abdominal pain, myalgias, weakness, confusion, lethargy, and hypoglycemia. It is felt that he has gastroparesis and maybe an ileus.    We were consulted as his EKG shows atrial flutter with a controlled rate.  His Tn was minimally elevated at 0.06.    He is very sleepy and hard to rouse.  When he does rouse, he can only utter simple yes/no answers.  He denies chest pain.  He is lying flat on his back in no respiratory distress.     An echo performed yesterday shows normal LV size/systolic function/wall motion and moderate AS.     Past Medical History:   Diagnosis Date   • Aortic stenosis    • Bacteremia due to Escherichia coli    • Diabetes mellitus (CMS/[facility])    • Hypertension    • UTI (urinary tract infection)        Past Surgical History:   Procedure Laterality Date   • CATARACT EXTRACTION, BILATERAL         Prior to Admission medications    Medication Sig Start Date End Date Taking? Authorizing Provider   acetaminophen (TYLENOL) 325 MG tablet Take 2 tablets by mouth every 4 (Four)  hours as needed for mild pain or fever. 1/16/20  Yes Anna Soriano APRN   amLODIPine (NORVASC) 5 MG tablet Take 1 tablet by mouth Daily. 5/16/19  Yes Vishnu Nash MD   aspirin 81 MG EC tablet Take 1 tablet by mouth daily. 1/17/20  Yes Anna Soriano APRN   bisacodyl (DULCOLAX) 5 MG EC tablet Take 2 tablets by mouth daily as needed for constipation. 1/16/20  Yes Anna Soriano APRN   brimonidine (ALPHAGAN) 0.2 % ophthalmic solution Administer 1 drop to both eyes 3 (Three) Times a Day.   Yes Khris Figueroa MD   dextrose (GLUTOSE) 40 % gel Take contents of one tube by mouth every 15 (Fifteen) minutes as needed for Low Blood Sugar (Blood sugar less than 70). 1/16/20  Yes Anna Soriano APRN   docusate sodium 100 MG capsule Take 1 capsule by mouth 2 (Two) times a day. 1/16/20  Yes Anna Soriano APRN   furosemide (LASIX) 40 MG tablet Take 0.5 tablets by mouth Daily.  Patient taking differently: Take 40 mg by mouth Daily. 5/15/19  Yes Vishnu Nash MD   glipiZIDE (GLUCOTROL) 10 MG tablet Take 10 mg by mouth 2 (Two) Times a Day Before Meals.   Yes Khris Figueroa MD   latanoprost (XALATAN) 0.005 % ophthalmic solution Administer 1 drop to both eyes Every Night.   Yes Khris Figueroa MD   lisinopril (PRINIVIL,ZESTRIL) 10 MG tablet Take 1 tablet by mouth Daily. 5/15/19  Yes Vishnu Nash MD   metFORMIN (GLUCOPHAGE) 1000 MG tablet Take 1,000 mg by mouth 2 (Two) Times a Day With Meals.   Yes Khris Figueroa MD   polyethylene glycol (MIRALAX) powder Mix 17 grams (1 capful in liquid) and take by mouth 2 (Two) times a day. 1/16/20  Yes Anna Soriano APRN   potassium chloride (K-DUR) 10 MEQ CR tablet Take 1 tablet by mouth Daily. 5/15/19  Yes Vishnu Nash MD   Skin Protectants, Misc. (EUCERIN) cream Apply  topically to the appropriate area as directed 2 (Two) Times a Day. Applied to bilateral lower extermities   Yes Provider, MD Khris  "  tamsulosin (FLOMAX) 0.4 MG capsule 24 hr capsule Take 1 capsule by mouth every night. 1/16/20  Yes Anna Soriano APRN       Social History     Socioeconomic History   • Marital status:      Spouse name: Not on file   • Number of children: Not on file   • Years of education: Not on file   • Highest education level: Not on file   Tobacco Use   • Smoking status: Never Smoker   • Smokeless tobacco: Never Used   Substance and Sexual Activity   • Alcohol use: No     Frequency: Never   • Drug use: No   • Sexual activity: Defer       No family history on file.    Review of Systems   Unable to perform ROS: Mental status change        Objective:     Vitals:    01/19/20 1400 01/19/20 1707 01/19/20 2015 01/20/20 0717   BP: 136/66 136/66 123/70 142/72   BP Location: Right arm  Left arm Right arm   Patient Position: Lying  Lying Lying   Pulse: 73 73 68 69   Resp: 18  18 16   Temp: 98.3 °F (36.8 °C)  97.9 °F (36.6 °C) 98 °F (36.7 °C)   TempSrc: Oral  Oral Oral   SpO2: 94%  98% 94%   Weight:  76 kg (167 lb 8.8 oz)     Height:  165 cm (64.96\")       Body mass index is 27.92 kg/m².  Flowsheet Rows      First Filed Value   Admission Height  165.1 cm (65\") Documented at 01/18/2020 2328   Admission Weight  75.8 kg (167 lb) Documented at 01/18/2020 2348          Physical Exam   Constitutional: He appears lethargic. He is sleeping. He has a sickly appearance.   HENT:   Head: Normocephalic.   Mouth/Throat: Mucous membranes are dry.   Eyes:   doesn't open eyes   Neck:   Lying flat   Cardiovascular: Normal rate and regular rhythm.   Murmur heard.   Systolic murmur is present with a grade of 3/6.  Pulmonary/Chest: Effort normal and breath sounds normal.   Abdominal: Soft. He exhibits no distension.   Musculoskeletal: He exhibits no edema.   Neurological: He appears lethargic.   Sleeping     Skin: Skin is warm and dry.   Psychiatric:   sleeping               Lab Review:                Results from last 7 days   Lab Units " 01/19/20  0750   SODIUM mmol/L 146*   POTASSIUM mmol/L 4.4   CHLORIDE mmol/L 106   CO2 mmol/L 29.3*   BUN mg/dL 23   CREATININE mg/dL 0.88   GLUCOSE mg/dL 84   CALCIUM mg/dL 9.6     Results from last 7 days   Lab Units 01/19/20  0750 01/18/20  2350   TROPONIN T ng/mL 0.056* 0.064*     Results from last 7 days   Lab Units 01/19/20  0750   WBC 10*3/mm3 19.16*   HEMOGLOBIN g/dL 10.0*   HEMATOCRIT % 29.8*   PLATELETS 10*3/mm3 364                                     I personally viewed and interpreted the patient's EKG/Telemetry data -- flutter    Assessment/Plan:     1.  Atrial flutter  2.  Minimally elevated Tn  3.  Moderate AS  4.  Gastroparesis/ileus  5.  Hypernatremia  6.  Dehydration  7.  DM2 with hypoglycemia    Given his generalized frailty and poor health, I do not recommend any further work-up for his minimally elevated troponin.  His EKG shows no ischemic findings and his echo shows normal left ventricular systolic function and wall motion.  He is on aspirin at home.  I would add a low-dose statin if all are in agreement.  I would not put him on a beta-blocker as his heart rate is currently in the 50s when I see him in the room.    Regarding his atrial flutter, he is rate controlled.  Assuming he has coronary artery disease, he has a chads vascular score of 5.  I would recommend apixaban 5 mg twice daily but only if that is okay with the primary team.  I do understand that his bleeding risk is not insignificant given his generalized poor health.    His aortic stenosis is stable in the moderate range.    We will see as needed.

## 2020-01-20 NOTE — PLAN OF CARE
Problem: Pain, Acute (Adult)  Goal: Acceptable Pain Control/Comfort Level  Outcome: Ongoing (interventions implemented as appropriate)  Flowsheets (Taken 1/20/2020 0316)  Acceptable Pain Control/Comfort Level: making progress toward outcome     Problem: Skin Injury Risk (Adult)  Goal: Skin Health and Integrity  Outcome: Ongoing (interventions implemented as appropriate)  Flowsheets (Taken 1/20/2020 0316)  Skin Health and Integrity: making progress toward outcome     Problem: Fall Risk (Adult)  Goal: Absence of Fall  Outcome: Ongoing (interventions implemented as appropriate)  Flowsheets (Taken 1/20/2020 0316)  Absence of Fall: making progress toward outcome     Problem: Patient Care Overview  Goal: Plan of Care Review  Outcome: Ongoing (interventions implemented as appropriate)  Flowsheets (Taken 1/20/2020 0316)  Progress: improving  Plan of Care Reviewed With: patient  Outcome Summary: Patient alert and oriented. VSS. Denies pain or nausea this shift. No vomiting. Abdomen not distended. BLE edema present- elevated on pillow. Turned q2h. IV fluids and abx continued. Small BM overnight. No acute distress. Slept comfortably. Will continue to monitor.

## 2020-01-20 NOTE — PROGRESS NOTES
Discharge Planning Assessment  [facility]     Patient Name: Raymon Solis  MRN: [id number]  Today's Date: 1/20/2020    Admit Date: 1/18/2020    Discharge Needs Assessment     Row Name 01/20/20 0918       Living Environment    Lives With  spouse    Name(s) of Who Lives With Patient  Spouse  ( Shirley Solis)    Current Living Arrangements  home/apartment/condo    Primary Care Provided by  self    Provides Primary Care For  no one    Family Caregiver if Needed  spouse;child(laquita), adult    Family Caregiver Names  Daughter  (Layla Abrams [phone])    Quality of Family Relationships  supportive;involved    Able to Return to Prior Arrangements  yes    Living Arrangement Comments  Pt lives in a single story house with spouse  (Shirley).       Resource/Environmental Concerns    Transportation Concerns  car, none       Transition Planning    Patient/Family Anticipates Transition to  inpatient rehabilitation facility    Patient/Family Anticipated Services at Transition  none    Transportation Anticipated  family or friend will provide;health plan transportation       Discharge Needs Assessment    Readmission Within the Last 30 Days  previous discharge plan unsuccessful    Concerns to be Addressed  basic needs    Equipment Currently Used at Home  glucometer;shower chair;walker, rolling    Anticipated Changes Related to Illness  none    Equipment Needed After Discharge  glucometer;shower chair;walker, rolling        Discharge Plan     Row Name 01/20/20 0920       Plan    Plan  Plan skilled care at accepting facility- pre cert needed.   NICOLE Quach RN    Provided post acute provider list?  Yes    Post Acute Provider List  Inpatient Rehab;Long Term Acute Care    Post Acute Provider Quality & Resource List  Yes    Delivered To  Support Person    Method of Delivery  In person    Patient/Family in Agreement with Plan  yes    Plan Comments  FACE SHEET VERIFIED/ IM LETTER SIGNED.   Spoke to pt's daughter  ( Layla Abrams [phone])  per phone.  Pt's PCP is Dr. Burgess.   Pt lives in a single story house with spouse  ( Shirley).   Pt was independent at home prior to recent hospitalization.  Pt has a glucometer, shower chair, and rolling walker for home use.  Pt [facility]  ([facility]  Pt is not current with HH.   [facility]   Daughter states she would like to attempt to find another facility for skilled care.  CMS COMPARE for SNF printed for daughter.   Plan skilled care at accepting facility- pre cert needed.   NICOLE Quach RN         Destination      Coordination has not been started for this encounter.      Durable Medical Equipment      Coordination has not been started for this encounter.      Dialysis/Infusion      Coordination has not been started for this encounter.      Home Medical Care      Coordination has not been started for this encounter.      Therapy      Coordination has not been started for this encounter.      Community Resources      Coordination has not been started for this encounter.          Demographic Summary     Row Name 01/20/20 0916       General Information    Admission Type  inpatient    Arrived From  emergency department    Required Notices Provided  Important Message from Medicare    Referral Source  admission list    Reason for Consult  discharge planning    Preferred Language  English     Used During This Interaction  no        Functional Status     Row Name 01/20/20 0916       Functional Status    Usual Activity Tolerance  good    Current Activity Tolerance  poor       Functional Status, IADL    Medications  completely dependent    Meal Preparation  completely dependent    Housekeeping  completely dependent    Laundry  completely dependent    Shopping  completely dependent       Mental Status    General Appearance WDL  WDL       Mental Status Summary    Recent Changes in Mental Status/Cognitive Functioning  no changes        Psychosocial    No  documentation.       Abuse/Neglect    No documentation.       Legal    No documentation.       Substance Abuse    No documentation.       Patient Forms    No documentation.           Kelly Quach RN

## 2020-01-20 NOTE — PROGRESS NOTES
Continued Stay Note  [facility]     Patient Name: Raymon Solis  MRN: [id number]  Today's Date: 1/20/2020    Admit Date: 1/18/2020    Discharge Plan     Row Name 01/20/20 0920       Plan    Plan  Plan skilled care at accepting facility- pre cert needed.   NICOLE Quach RN    Provided post acute provider list?  Yes    Post Acute Provider List  Inpatient Rehab;Long Term Acute Care    Post Acute Provider Quality & Resource List  Yes    Delivered To  Support Person    Method of Delivery  In person    Patient/Family in Agreement with Plan  yes    Plan Comments  FACE SHEET VERIFIED/ IM LETTER SIGNED.   Spoke to pt's daughter  ( Layla Abrams [phone]) per phone.  Pt's PCP is Dr. Burgess.   Pt lives in a single story house with spouse  ( Shirley).   Pt was independent at home prior to recent hospitalization.  Pt has a glucometer, shower chair, and rolling walker for home use.  Pt gets prescriptions at [facility]  ([facility]).  Pt is not current with .   [facility]   Daughter states she would like to attempt to find another facility for skilled care.  CMS COMPARE for SNF printed for daughter.   Plan skilled care at accepting facility- pre cert needed.   NICOLE Quach RN         Discharge Codes    No documentation.             Kelly Quach, RN

## 2020-01-20 NOTE — PROGRESS NOTES
"Progress Note    Chief Complaint   Patient presents with   • Hypoglycemia   • Abdominal Pain   • Nausea       S: There were no events overnight.  No abdominal pain, no nausea or vomiting after NG was removed    O:/72 (BP Location: Right arm, Patient Position: Lying)   Pulse 69   Temp 98 °F (36.7 °C) (Oral)   Resp 16   Ht 165 cm (64.96\")   Wt 76 kg (167 lb 8.8 oz)   SpO2 94%   BMI 27.92 kg/m²   Body mass index is 27.92 kg/m².    I/O last 3 completed shifts:  In: 1425 [I.V.:100; IV Piggyback:1325]  Out: 1525 [Urine:750; Emesis/NG output:775]  No intake/output data recorded.      GENERAL:oriented to person, place, and time and chronically ill appearing  HEENT: normochephalic, atraumatic, moist mucus membranes, clear sclerae   CHEST: clear to auscultation, no wheezes, rales or rhonchi, symmetric air entry  CARDIAC: regular rate and rhythm    ABDOMEN: soft, nontender, nondistended, no masses or organomegaly  EXTREMITIES: no cyanosis, clubbing or edema    SKIN: Warm and moist, no rashes    Results from last 7 days   Lab Units 01/19/20  0750   WBC 10*3/mm3 19.16*   HEMOGLOBIN g/dL 10.0*   HEMATOCRIT % 29.8*   PLATELETS 10*3/mm3 364     Results from last 7 days   Lab Units 01/20/20  1023 01/19/20  0750 01/18/20  2350   SODIUM mmol/L 139 146* 148*   POTASSIUM mmol/L 4.2 4.4 4.5   CHLORIDE mmol/L 102 106 107   CO2 mmol/L 28.3 29.3* 29.4*   BUN mg/dL 17 23 25*   CREATININE mg/dL 0.83 0.88 1.00   CALCIUM mg/dL 8.8 9.6 9.6   BILIRUBIN mg/dL 0.3  --  <0.2*   ALK PHOS U/L 126*  --  167*   ALT (SGPT) U/L 9  --  16   AST (SGOT) U/L 17  --  25   GLUCOSE mg/dL 102* 84 97       ROS:   Gastrointestinal: denies N&V, abd pain, diarrhea, constipation.  Genitourinary: denies dysuria, frequency.    DIET: N.p.o.    A/P: 79 y.o. male with multiple medical problems admitted for nausea and vomiting, was found to have gastric distention.  NG tube had minimal output.  Has no have any problems since then.  I think his gastric " distention was reactive to whatever systemic issues going on.    -Clear liquid diet and advance as tolerated    Chavez Reyes MD  General, Minimally Invasive and Endoscopic Surgery  [facility]    [address], [address]  [place], [address]  P: [phone]  F: [phone]

## 2020-01-20 NOTE — PLAN OF CARE
Problem: Pain, Acute (Adult)  Goal: Acceptable Pain Control/Comfort Level  Outcome: Ongoing (interventions implemented as appropriate)  Flowsheets (Taken 1/20/2020 1652)  Acceptable Pain Control/Comfort Level: making progress toward outcome     Problem: Skin Injury Risk (Adult)  Goal: Skin Health and Integrity  Outcome: Ongoing (interventions implemented as appropriate)  Flowsheets (Taken 1/20/2020 1652)  Skin Health and Integrity: making progress toward outcome     Problem: Fall Risk (Adult)  Goal: Absence of Fall  Outcome: Ongoing (interventions implemented as appropriate)  Flowsheets (Taken 1/20/2020 1652)  Absence of Fall: making progress toward outcome     Problem: Urinary Elimination/Continence Impairment (IRF) (Adult)  Goal: Optimal Management of Bladder Program, Effective Elimination  Outcome: Ongoing (interventions implemented as appropriate)  Goal: Eliminate/Minimize Urinary Incontinence Episodes  Outcome: Ongoing (interventions implemented as appropriate)     Problem: Patient Care Overview  Goal: Plan of Care Review  Outcome: Ongoing (interventions implemented as appropriate)  Flowsheets (Taken 1/20/2020 1652)  Plan of Care Reviewed With: patient  Outcome Summary: Patient alert and oriented. Vital signs are stable. Denies pain or nausea. No vomiting reported. Abdomen not distended. KUB done. BLE edema present. Elevated with pillows. Turn every 2 hrs. Patient was unable to urinated. In and out cath done.  Iv antibiotics and iv fluids. No s/s of acute distress. Will continue to monitor.  Goal: Individualization and Mutuality  Outcome: Ongoing (interventions implemented as appropriate)  Goal: Discharge Needs Assessment  Outcome: Ongoing (interventions implemented as appropriate)  Goal: Interprofessional Rounds/Family Conf  Outcome: Ongoing (interventions implemented as appropriate)

## 2020-01-20 NOTE — THERAPY EVALUATION
Patient Name: Raymon Solis  : 1940    MRN: [id number]                              Today's Date: 2020       Admit Date: 2020    Visit Dx:     ICD-10-CM ICD-9-CM   1. Acute abdominal pain R10.9 789.00     338.19   2. Lactic acidosis E87.2 276.2   3. Intractable vomiting with nausea, unspecified vomiting type R11.2 536.2   4. History of UTI Z87.440 V13.02   5. History of pneumonia Z87.01 V12.61   6. Recurrent Hypoglycemia E16.2 251.2   7. Gastric distention K31.89 536.8     Patient Active Problem List   Diagnosis   • Acute pyelonephritis   • HTN (hypertension)   • Type 2 diabetes mellitus, without long-term current use of insulin (CMS/HCC)   • Elevated alkaline phosphatase level   • Aortic stenosis   • Acute UTI   • Wandering atrial pacemaker   • Elevated troponin   • Sepsis without acute organ dysfunction (CMS/HCC)   • History of noncompliance with medical treatment   • Bacteremia due to Escherichia coli   • Left lower lobe pulmonary infiltrate   • Non-traumatic rhabdomyolysis   • Lesion of left native kidney   • Constipation   • Acute abdominal pain   • Atrial flutter (CMS/HCC)   • Hypernatremia   • Pleural effusion   • Hypoglycemia   • Gastric distention     Past Medical History:   Diagnosis Date   • Aortic stenosis    • Bacteremia due to Escherichia coli    • Diabetes mellitus (CMS/HCC)    • Hypertension    • UTI (urinary tract infection)      Past Surgical History:   Procedure Laterality Date   • CATARACT EXTRACTION, BILATERAL       General Information     Row Name 20 1132          PT Evaluation Time/Intention    Document Type  evaluation  -AE     Mode of Treatment  physical therapy  -AE     Row Name 20 1132          General Information    Patient Profile Reviewed?  yes  -AE     Row Name 20 1132          Relationship/Environment    Lives With  spouse  -AE     Row Name 20 1132          Resource/Environmental Concerns    Current Living Arrangements  home/apartment/condo   -AE     Row Name 01/20/20 1132          Home Main Entrance    Number of Stairs, Main Entrance  two  -AE     Stair Railings, Main Entrance  -- unsure  -AE     Row Name 01/20/20 1132          Stairs Within Home, Primary    Number of Stairs, Within Home, Primary  none  -AE     Row Name 01/20/20 1132          Cognitive Assessment/Intervention- PT/OT    Orientation Status (Cognition)  oriented to;person;place  -AE     Cognitive Assessment/Intervention Comment  unsure of orientation due to little verbal communication from patient  -AE     Row Name 01/20/20 1132          Safety Issues, Functional Mobility    Safety Issues Affecting Function (Mobility)  insight into deficits/self awareness  -AE     Impairments Affecting Function (Mobility)  balance;endurance/activity tolerance;strength;postural/trunk control  -AE       User Key  (r) = Recorded By, (t) = Taken By, (c) = Cosigned By    Initials Name Provider Type    AE Leilani Gomez, PT Physical Therapist        Mobility     Row Name 01/20/20 1132          Bed Mobility Assessment/Treatment    Bed Mobility Assessment/Treatment  bed mobility (all) activities  -AE     [place] Level (Bed Mobility)  minimum assist (75% patient effort)  -AE     Assistive Device (Bed Mobility)  bed rails  -AE     Comment (Bed Mobility)  pt was [place] for rolling and supine<>sit with use of bed rails  -AE     Row Name 01/20/20 1132          Transfer Assessment/Treatment    Comment (Transfers)  pt is CGA/Demetra for sit<>stand transfer; refused transfer to chair  -AE     Row Name 01/20/20 1132          Sit-Stand Transfer    Sit-Stand [place] (Transfers)  contact guard;minimum assist (75% patient effort)  -AE     Assistive Device (Sit-Stand Transfers)  walker, front-wheeled  -AE     Row Name 01/20/20 1132          Gait/Stairs Assessment/Training    Distance in Feet (Gait)  pt refused  -AE       User Key  (r) = Recorded By, (t) = Taken By, (c) = Cosigned By    Initials Name Provider Type    AE  Leilani Gomez, PT Physical Therapist        Obj/Interventions     Row Name 01/20/20 1133          General ROM    GENERAL ROM COMMENTS  BLE WFL  -AE     Row Name 01/20/20 1133          MMT (Manual Muscle Testing)    General MMT Comments  generalized weakness  -AE       User Key  (r) = Recorded By, (t) = Taken By, (c) = Cosigned By    Initials Name Provider Type    AE Leilani Gomez, PT Physical Therapist        Goals/Plan     Row Name 01/20/20 1134          Bed Mobility Goal 1 (PT)    Activity/Assistive Device (Bed Mobility Goal 1, PT)  bed mobility activities, all  -AE     [place] Level/Cues Needed (Bed Mobility Goal 1, PT)  supervision required  -AE     Time Frame (Bed Mobility Goal 1, PT)  1 week  -AE     Progress/Outcomes (Bed Mobility Goal 1, PT)  continuing progress toward goal  -AE     Row Name 01/20/20 1134          Transfer Goal 1 (PT)    Activity/Assistive Device (Transfer Goal 1, PT)  transfers, all  -AE     [place] Level/Cues Needed (Transfer Goal 1, PT)  contact guard assist  -AE     Time Frame (Transfer Goal 1, PT)  1 week  -AE     Progress/Outcome (Transfer Goal 1, PT)  continuing progress toward goal  -AE     Row Name 01/20/20 1134          Gait Training Goal 1 (PT)    Activity/Assistive Device (Gait Training Goal 1, PT)  gait (walking locomotion);assistive device use  -AE     [place] Level (Gait Training Goal 1, PT)  contact guard assist  -AE     Distance (Gait Goal 1, PT)  20 ft  -AE     Time Frame (Gait Training Goal 1, PT)  1 week  -AE       User Key  (r) = Recorded By, (t) = Taken By, (c) = Cosigned By    Initials Name Provider Type    AE Leilani Gomez, PT Physical Therapist        Clinical Impression     Row Name 01/20/20 1133          Pain Assessment    Additional Documentation  Pain Scale: Numbers Pre/Post-Treatment (Group)  -AE     Row Name 01/20/20 1133          Pain Scale: Numbers Pre/Post-Treatment    Pain Scale: Numbers, Pretreatment  0/10 - no pain  -AE     Pain  Scale: Numbers, Post-Treatment  0/10 - no pain  -AE     Row Name 01/20/20 1133          Plan of Care Review    Plan of Care Reviewed With  patient  -AE     Row Name 01/20/20 1133          Physical Therapy Clinical Impression    Criteria for Skilled Interventions Met (PT Clinical Impression)  yes;treatment indicated  -AE     Rehab Potential (PT Clinical Summary)  fair, will monitor progress closely  -AE     Row Name 01/20/20 1133          Positioning and Restraints    Pre-Treatment Position  in bed  -AE     Post Treatment Position  bed  -AE     In Bed  supine;call light within reach;encouraged to call for assist;exit alarm on  -AE       User Key  (r) = Recorded By, (t) = Taken By, (c) = Cosigned By    Initials Name Provider Type    Leilani Otto PT Physical Therapist        Outcome Measures     Row Name 01/20/20 1135          How much help from another person do you currently need...    Turning from your back to your side while in flat bed without using bedrails?  2  -AE     Moving from lying on back to sitting on the side of a flat bed without bedrails?  2  -AE     Moving to and from a bed to a chair (including a wheelchair)?  2  -AE     Standing up from a chair using your arms (e.g., wheelchair, bedside chair)?  3  -AE     Climbing 3-5 steps with a railing?  1  -AE     To walk in hospital room?  2  -AE     AM-PAC 6 Clicks Score (PT)  12  -AE     Row Name 01/20/20 1135          Functional Assessment    Outcome Measure Options  AM-PAC 6 Clicks Basic Mobility (PT)  -AE       User Key  (r) = Recorded By, (t) = Taken By, (c) = Cosigned By    Initials Name Provider Type    Leilani Otto PT Physical Therapist          PT Recommendation and Plan  Planned Therapy Interventions (PT Eval): balance training, bed mobility training, gait training, home exercise program, patient/family education, postural re-education, ROM (range of motion), stair training, strengthening, stretching, transfer training  Outcome  Summary/Treatment Plan (PT)  Anticipated Discharge Disposition (PT): skilled nursing facility  Plan of Care Reviewed With: patient     Time Calculation:   PT Charges     Row Name 01/20/20 1140             Time Calculation    Start Time  1045  -AE      Stop Time  1115  -AE      Time Calculation (min)  30 min  -AE      PT Received On  01/20/20  -AE      PT - Next Appointment  01/21/20  -AE      PT Goal Re-Cert Due Date  01/27/20  -AE         Time Calculation- PT    Total Timed Code Minutes- PT  25 minute(s)  -AE        User Key  (r) = Recorded By, (t) = Taken By, (c) = Cosigned By    Initials Name Provider Type    AE Leilani Gomez, PT Physical Therapist        Therapy Charges for Today     Code Description Service Date Service Provider Modifiers Qty    55464809373 HC PT EVAL MOD COMPLEXITY 2 1/20/2020 Leilani Gomez, PT GP 1    86106817990 HC PT THERAPEUTIC ACT EA 15 MIN 1/20/2020 Leilani Gomez, PT GP 1          PT G-Codes  Outcome Measure Options: AM-PAC 6 Clicks Basic Mobility (PT)  AM-PAC 6 Clicks Score (PT): 12    Leilani Gomez PT  1/20/2020

## 2020-01-20 NOTE — PROGRESS NOTES
[facility]  Inpatient Progress Note    Reason for Follow Up:  Gastric distension on imaging    Subjective     Interval History:   NG tube is out.  He denies further issues with abdominal pain, no nausea or vomiting.  However, he has been offered clear liquids but has not taken any yet.    Current Facility-Administered Medications:   •  acetaminophen (TYLENOL) tablet 650 mg, 650 mg, Oral, Q4H PRN **OR** acetaminophen (TYLENOL) 160 MG/5ML solution 650 mg, 650 mg, Oral, Q4H PRN **OR** acetaminophen (TYLENOL) suppository 650 mg, 650 mg, Rectal, Q4H PRN, Dian Orellana, APRN  •  aspirin EC tablet 81 mg, 81 mg, Oral, Daily, Lisa Umanzor APRN, 81 mg at 01/20/20 0850  •  bisacodyl (DULCOLAX) EC tablet 10 mg, 10 mg, Oral, Daily PRN, Lisa Umanzor APRN  •  brimonidine (ALPHAGAN) 0.2 % ophthalmic solution 1 drop, 1 drop, Both Eyes, TID, Lisa Umanzor APRN, 1 drop at 01/20/20 0850  •  dextrose (D50W) 25 g/ 50mL Intravenous Solution 25 g, 25 g, Intravenous, Q15 Min PRN, Lisa Umanzor APRN  •  dextrose (GLUTOSE) oral gel 15 g, 15 g, Oral, Q15 Min PRN, Lisa Umanzor APRN  •  dextrose 5 % and sodium chloride 0.45 % infusion, 75 mL/hr, Intravenous, Continuous, Lisa Umanzor APRN, Last Rate: 75 mL/hr at 01/20/20 1203, 75 mL/hr at 01/20/20 1203  •  docusate sodium (COLACE) capsule 100 mg, 100 mg, Oral, BID, Lisa Umanzor APRN, 100 mg at 01/20/20 0850  •  enoxaparin (LOVENOX) syringe 40 mg, 40 mg, Subcutaneous, Q24H, Lisa Umanzor APRN, 40 mg at 01/19/20 1421  •  glucagon (human recombinant) (GLUCAGEN DIAGNOSTIC) injection 1 mg, 1 mg, Subcutaneous, Q15 Min PRN, Lias Umanzor APRCARMEN  •  hydrALAZINE (APRESOLINE) injection 10 mg, 10 mg, Intravenous, Q6H PRN, Lisa Umanzor APRN  •  hydrocerin (EUCERIN) cream, , Topical, BID, Lisa Umanzor APRN  •  insulin lispro (humaLOG) injection 0-7 Units, 0-7 Units, Subcutaneous, 4x Daily With Meals & Nightly, Lisa Umanzor APRN  •   latanoprost (XALATAN) 0.005 % ophthalmic solution 1 drop, 1 drop, Both Eyes, Nightly, Lisa Umanzor APRN, 1 drop at 01/19/20 2128  •  metoclopramide (REGLAN) injection 10 mg, 10 mg, Intravenous, Q6H, Jimmy Marie MD, 10 mg at 01/20/20 1019  •  morphine injection 1 mg, 1 mg, Intravenous, Q4H PRN, Lisa Umanzor APRN  •  nitroglycerin (NITROSTAT) SL tablet 0.4 mg, 0.4 mg, Sublingual, Q5 Min PRN, Dian Orellana APRN  •  piperacillin-tazobactam (ZOSYN) 3.375 g in iso-osmotic dextrose 50 ml (premix), 3.375 g, Intravenous, Q8H, Lisa Umanzor APRN, 3.375 g at 01/20/20 1019  •  polyethylene glycol (MIRALAX) powder 17 g, 17 g, Oral, BID, Lisa Umanzor APRN, 17 g at 01/20/20 0850  •  promethazine (PHENERGAN) injection 12.5 mg, 12.5 mg, Intravenous, Q6H PRN, Lisa Umanzor APRN  •  [COMPLETED] Insert peripheral IV, , , Once **AND** sodium chloride 0.9 % flush 10 mL, 10 mL, Intravenous, PRN, Sabas Steel MD  •  sodium chloride 0.9 % flush 10 mL, 10 mL, Intravenous, Q12H, Dian Orellana APRN, 10 mL at 01/20/20 0850  •  sodium chloride 0.9 % flush 10 mL, 10 mL, Intravenous, PRN, Dian Orellana APRN  •  tamsulosin (FLOMAX) 24 hr capsule 0.4 mg, 0.4 mg, Oral, Nightly, Lisa Umaznor APRN, 0.4 mg at 01/19/20 2008  Review of Systems:     The following systems were reviewed and negative: Constitution, pulmonary, cardiac, gastrointestinal, genitourinary      Objective     Vital Signs  Temp:  [97.6 °F (36.4 °C)-98.3 °F (36.8 °C)] 97.6 °F (36.4 °C)  Heart Rate:  [57-73] 57  Resp:  [16-18] 16  BP: (123-153)/(66-76) 153/76  Body mass index is 27.92 kg/m².    Intake/Output Summary (Last 24 hours) at 1/20/2020 1344  Last data filed at 1/20/2020 0635  Gross per 24 hour   Intake 825 ml   Output 475 ml   Net 350 ml     No intake/output data recorded.     Physical Exam:   General: patient awake, alert and cooperative, unclear how much she is understanding   Eyes: Normal lids and lashes, no  scleral icterus   Neck: supple, normal ROM   Skin: warm and dry, not jaundiced   Cardiovascular: regular rhythm and rate, no murmurs auscultated   Pulm: clear to auscultation bilaterally, regular and unlabored   Abdomen: soft, nontender, nondistended; normal bowel sounds   Rectal: deferred   Extremities: no rash or edema   Psychiatric: Normal mood and behavior; unclear memory, unclear how much she is comprehending my conversation with him     Results Review:     I reviewed the patient's new clinical results.    Results from last 7 days   Lab Units 01/20/20  1158 01/19/20  0750 01/18/20  2350   WBC 10*3/mm3 9.49 19.16* 12.84*   HEMOGLOBIN g/dL 8.7* 10.0* 9.9*   HEMATOCRIT % 26.5* 29.8* 30.0*   PLATELETS 10*3/mm3 324 364 354     Results from last 7 days   Lab Units 01/20/20  1023 01/19/20  0750 01/18/20  2350   SODIUM mmol/L 139 146* 148*   POTASSIUM mmol/L 4.2 4.4 4.5   CHLORIDE mmol/L 102 106 107   CO2 mmol/L 28.3 29.3* 29.4*   BUN mg/dL 17 23 25*   CREATININE mg/dL 0.83 0.88 1.00   CALCIUM mg/dL 8.8 9.6 9.6   BILIRUBIN mg/dL 0.3  --  <0.2*   ALK PHOS U/L 126*  --  167*   ALT (SGPT) U/L 9  --  16   AST (SGOT) U/L 17  --  25   GLUCOSE mg/dL 102* 84 97         Lab Results   Lab Value Date/Time    LIPASE 40 01/18/2020 2350    LIPASE 21 05/13/2019 1657       Radiology:  CT Abdomen Pelvis With Contrast   Final Result       1. Marked distention of the stomach. Duodenum appears unremarkable, and   there is no evidence of bowel obstruction. Findings may reflect   gastroparesis/gastric ileus. There is patulous and fluid-filled   appearance to the distal esophagus, favored to be secondary to stasis   within the stomach. Patient may benefit from nasogastric decompression.   While there is no convincing evidence of mechanical small bowel   obstruction on the patient does have a large volume of fecal material   throughout the colon, suggesting constipation.   2. Slight interval increase in bibasilar consolidation. This may    represent atelectasis, although pneumonia is not excluded. Correlation   with clinical presentation is recommended. Persistent bilateral pleural   effusions are noted.       Radiation dose reduction techniques were utilized, including automated   exposure control and exposure modulation based on body size.       This report was finalized on 1/19/2020 1:23 AM by Dr. Kitty Moran M.D.          XR Chest 1 View   Final Result       1. Cardiomegaly and vascular congestion, new when compared to prior   exam.   2. Worsening bibasilar atelectasis.   3. Marked gaseous distention of bowel seen within the upper abdomen.       This report was finalized on 1/19/2020 12:33 AM by Dr. Kitty Moran M.D.              Assessment/Plan     Patient Active Problem List   Diagnosis   • Acute pyelonephritis   • HTN (hypertension)   • Type 2 diabetes mellitus, without long-term current use of insulin (CMS/HCC)   • Elevated alkaline phosphatase level   • Aortic stenosis   • Acute UTI   • Wandering atrial pacemaker   • Elevated troponin   • Sepsis without acute organ dysfunction (CMS/HCC)   • History of noncompliance with medical treatment   • Bacteremia due to Escherichia coli   • Left lower lobe pulmonary infiltrate   • Non-traumatic rhabdomyolysis   • Lesion of left native kidney   • Constipation   • Acute abdominal pain   • Atrial flutter (CMS/HCC)   • Hypernatremia   • Pleural effusion   • Hypoglycemia   • Gastric distention   • Aspiration pneumonia (CMS/HCC)       Impression  1. Gastric distension    2. Abnormal CT abdomen: Gastric distention, constipation    3. Abdominal pain    4. Elevated troponin, atrial flutter, aortic stenosis      Plan  Encouraged him to take some clear liquids  Continue bowel regimen  After he takes some clear liquids, would like to see what an esophagram looks like given the significant abnormal appearance of his stomach and esophagus on a recent CT scan; will look to do this tomorrow    I  discussed the patients findings and my recommendations with patient, family and nursing staff.    Colleen Maldonado MD

## 2020-01-21 ENCOUNTER — APPOINTMENT (OUTPATIENT)
Dept: GENERAL RADIOLOGY | Facility: HOSPITAL | Age: 80
End: 2020-01-21

## 2020-01-21 LAB
DEPRECATED RDW RBC AUTO: 42.2 FL (ref 37–54)
ERYTHROCYTE [DISTWIDTH] IN BLOOD BY AUTOMATED COUNT: 13.2 % (ref 12.3–15.4)
GLUCOSE BLDC GLUCOMTR-MCNC: 108 MG/DL (ref 70–130)
GLUCOSE BLDC GLUCOMTR-MCNC: 118 MG/DL (ref 70–130)
GLUCOSE BLDC GLUCOMTR-MCNC: 120 MG/DL (ref 70–130)
GLUCOSE BLDC GLUCOMTR-MCNC: 194 MG/DL (ref 70–130)
HCT VFR BLD AUTO: 27.5 % (ref 37.5–51)
HGB BLD-MCNC: 9.2 G/DL (ref 13–17.7)
MCH RBC QN AUTO: 29.7 PG (ref 26.6–33)
MCHC RBC AUTO-ENTMCNC: 33.5 G/DL (ref 31.5–35.7)
MCV RBC AUTO: 88.7 FL (ref 79–97)
PLATELET # BLD AUTO: 313 10*3/MM3 (ref 140–450)
PMV BLD AUTO: 8.9 FL (ref 6–12)
RBC # BLD AUTO: 3.1 10*6/MM3 (ref 4.14–5.8)
WBC NRBC COR # BLD: 7.83 10*3/MM3 (ref 3.4–10.8)

## 2020-01-21 PROCEDURE — 99232 SBSQ HOSP IP/OBS MODERATE 35: CPT | Performed by: INTERNAL MEDICINE

## 2020-01-21 PROCEDURE — 25010000002 METOCLOPRAMIDE PER 10 MG: Performed by: INTERNAL MEDICINE

## 2020-01-21 PROCEDURE — 71046 X-RAY EXAM CHEST 2 VIEWS: CPT

## 2020-01-21 PROCEDURE — 85027 COMPLETE CBC AUTOMATED: CPT | Performed by: NURSE PRACTITIONER

## 2020-01-21 PROCEDURE — 63710000001 INSULIN LISPRO (HUMAN) PER 5 UNITS: Performed by: NURSE PRACTITIONER

## 2020-01-21 PROCEDURE — 82962 GLUCOSE BLOOD TEST: CPT

## 2020-01-21 PROCEDURE — 25010000002 ENOXAPARIN PER 10 MG: Performed by: NURSE PRACTITIONER

## 2020-01-21 PROCEDURE — 97110 THERAPEUTIC EXERCISES: CPT

## 2020-01-21 PROCEDURE — 25010000002 PIPERACILLIN SOD-TAZOBACTAM PER 1 G: Performed by: NURSE PRACTITIONER

## 2020-01-21 PROCEDURE — 99231 SBSQ HOSP IP/OBS SF/LOW 25: CPT | Performed by: SURGERY

## 2020-01-21 RX ADMIN — DEXTROSE AND SODIUM CHLORIDE 75 ML/HR: 5; 450 INJECTION, SOLUTION INTRAVENOUS at 10:34

## 2020-01-21 RX ADMIN — SODIUM CHLORIDE, PRESERVATIVE FREE 10 ML: 5 INJECTION INTRAVENOUS at 20:14

## 2020-01-21 RX ADMIN — DOCUSATE SODIUM 100 MG: 100 CAPSULE, LIQUID FILLED ORAL at 08:29

## 2020-01-21 RX ADMIN — ASPIRIN 81 MG: 81 TABLET, COATED ORAL at 08:29

## 2020-01-21 RX ADMIN — TAMSULOSIN HYDROCHLORIDE 0.4 MG: 0.4 CAPSULE ORAL at 20:13

## 2020-01-21 RX ADMIN — TAZOBACTAM SODIUM AND PIPERACILLIN SODIUM 3.38 G: 375; 3 INJECTION, SOLUTION INTRAVENOUS at 17:50

## 2020-01-21 RX ADMIN — TAZOBACTAM SODIUM AND PIPERACILLIN SODIUM 3.38 G: 375; 3 INJECTION, SOLUTION INTRAVENOUS at 03:45

## 2020-01-21 RX ADMIN — Medication: at 20:15

## 2020-01-21 RX ADMIN — BRIMONIDINE TARTRATE 1 DROP: 2 SOLUTION OPHTHALMIC at 20:14

## 2020-01-21 RX ADMIN — METOCLOPRAMIDE 10 MG: 5 INJECTION, SOLUTION INTRAMUSCULAR; INTRAVENOUS at 04:02

## 2020-01-21 RX ADMIN — POLYETHYLENE GLYCOL 3350 17 G: 17 POWDER, FOR SOLUTION ORAL at 08:29

## 2020-01-21 RX ADMIN — LATANOPROST 1 DROP: 50 SOLUTION OPHTHALMIC at 20:14

## 2020-01-21 RX ADMIN — BRIMONIDINE TARTRATE 1 DROP: 2 SOLUTION OPHTHALMIC at 16:14

## 2020-01-21 RX ADMIN — TAZOBACTAM SODIUM AND PIPERACILLIN SODIUM 3.38 G: 375; 3 INJECTION, SOLUTION INTRAVENOUS at 10:34

## 2020-01-21 RX ADMIN — BISACODYL 10 MG: 5 TABLET ORAL at 08:30

## 2020-01-21 RX ADMIN — Medication: at 08:30

## 2020-01-21 RX ADMIN — POLYETHYLENE GLYCOL 3350 17 G: 17 POWDER, FOR SOLUTION ORAL at 20:14

## 2020-01-21 RX ADMIN — INSULIN LISPRO 2 UNITS: 100 INJECTION, SOLUTION INTRAVENOUS; SUBCUTANEOUS at 17:27

## 2020-01-21 RX ADMIN — DOCUSATE SODIUM 100 MG: 100 CAPSULE, LIQUID FILLED ORAL at 20:14

## 2020-01-21 RX ADMIN — ENOXAPARIN SODIUM 40 MG: 40 INJECTION SUBCUTANEOUS at 14:25

## 2020-01-21 RX ADMIN — METOCLOPRAMIDE 10 MG: 5 INJECTION, SOLUTION INTRAMUSCULAR; INTRAVENOUS at 16:13

## 2020-01-21 RX ADMIN — BRIMONIDINE TARTRATE 1 DROP: 2 SOLUTION OPHTHALMIC at 08:29

## 2020-01-21 RX ADMIN — SODIUM CHLORIDE, PRESERVATIVE FREE 10 ML: 5 INJECTION INTRAVENOUS at 08:29

## 2020-01-21 RX ADMIN — METOCLOPRAMIDE 10 MG: 5 INJECTION, SOLUTION INTRAMUSCULAR; INTRAVENOUS at 10:00

## 2020-01-21 NOTE — PLAN OF CARE
Problem: Patient Care Overview  Goal: Plan of Care Review  Outcome: Ongoing (interventions implemented as appropriate)  Flowsheets (Taken 1/21/2020 1170)  Progress: improving  Plan of Care Reviewed With: patient  Outcome Summary: Pt tolerated treatment with no complaints. Pt requires a lot of encouragement to participate with PT. Pt agreed to sitting up in chair today. Pt is Ronni for bed mobility with verbal cueing. Pt able to sit on EOB with CGA. Pt stood for several minutes with CGA for toileting needs and took a few steps with from bed to chair with CGA, with rwx. Pt required safety cues when transferring as pt tryring to sit before getting close enough to the chair.  Pt declined further ambulation.  Will continue to progress pt as able.

## 2020-01-21 NOTE — PROGRESS NOTES
"Progress Note    Chief Complaint   Patient presents with   • Hypoglycemia   • Abdominal Pain   • Nausea       S: There were no events overnight.  Feels weak, tolerating clears.     O:/76 (BP Location: Right arm, Patient Position: Lying)   Pulse 60   Temp 97.9 °F (36.6 °C) (Oral)   Resp 16   Ht 165 cm (64.96\")   Wt 76 kg (167 lb 8.8 oz)   SpO2 98%   BMI 27.92 kg/m²   Body mass index is 27.92 kg/m².    I/O last 3 completed shifts:  In: 2305 [P.O.:480; I.V.:900; IV Piggyback:925]  Out: 2091 [Urine:2091]  I/O this shift:  In: 1410 [P.O.:410; I.V.:1000]  Out: 420 [Urine:420]      GENERAL:alert, well appearing, and in no distress, looks chronically ill.  HEENT: normochephalic, atraumatic, moist mucus membranes, clear sclerae   CHEST: clear to auscultation, no wheezes, rales or rhonchi, symmetric air entry  CARDIAC: regular rate and rhythm    ABDOMEN: soft, nontender, nondistended, no masses or organomegaly  EXTREMITIES: no cyanosis, clubbing or edema    SKIN: Warm and moist, no rashes    Results from last 7 days   Lab Units 01/21/20  0542   WBC 10*3/mm3 7.83   HEMOGLOBIN g/dL 9.2*   HEMATOCRIT % 27.5*   PLATELETS 10*3/mm3 313     Results from last 7 days   Lab Units 01/20/20  1023 01/19/20  0750 01/18/20  2350   SODIUM mmol/L 139 146* 148*   POTASSIUM mmol/L 4.2 4.4 4.5   CHLORIDE mmol/L 102 106 107   CO2 mmol/L 28.3 29.3* 29.4*   BUN mg/dL 17 23 25*   CREATININE mg/dL 0.83 0.88 1.00   CALCIUM mg/dL 8.8 9.6 9.6   BILIRUBIN mg/dL 0.3  --  <0.2*   ALK PHOS U/L 126*  --  167*   ALT (SGPT) U/L 9  --  16   AST (SGOT) U/L 17  --  25   GLUCOSE mg/dL 102* 84 97       ROS:   Constitutional: denies any weight changes, fatigue or weakness.  Cardiovascular: denies chest pain, palpitations, edemas.  Respiratory: denies cough, sputum, SOB.  Gastrointestinal: denies N&V, abd pain, diarrhea, constipation.    DIET: CLD    Abdominal x-ray yesterday showed no abnormalities    A/P: 79 y.o. male with one episode of nausea and " vomiting and abdominal pain, found to be septic.  Had gastric distention on abdominal x-ray on admission, abdominal x-ray yesterday was normal.  I do not think he has any problem and I do not think he needs to have any further work-up unless he cannot tolerate diet.    -Advance diet as tolerated  -I will see tomorrow to make sure he is tolerating diet        Chavez Reyes MD  General, Minimally Invasive and Endoscopic Surgery  [facility]    [address], [address]  [place], [place], [address]  P: [phone]  F: [phone]

## 2020-01-21 NOTE — PROGRESS NOTES
[facility]  Inpatient Progress Note    Reason for Follow Up:  Gastric distension on imaging    Subjective     Interval History:   KUB with stool burden.  Taking some liquids.  Says pain is better.  Large BM just now.    Current Facility-Administered Medications:   •  acetaminophen (TYLENOL) tablet 650 mg, 650 mg, Oral, Q4H PRN **OR** acetaminophen (TYLENOL) 160 MG/5ML solution 650 mg, 650 mg, Oral, Q4H PRN **OR** acetaminophen (TYLENOL) suppository 650 mg, 650 mg, Rectal, Q4H PRN, Dian Orellana APRN  •  aspirin EC tablet 81 mg, 81 mg, Oral, Daily, Lisa Umanzor APRN, 81 mg at 01/21/20 0829  •  bisacodyl (DULCOLAX) EC tablet 10 mg, 10 mg, Oral, Daily PRN, Lisa Umanzor APRN, 10 mg at 01/21/20 0830  •  brimonidine (ALPHAGAN) 0.2 % ophthalmic solution 1 drop, 1 drop, Both Eyes, TID, Lisa Umanzor APRN, 1 drop at 01/21/20 0829  •  dextrose (D50W) 25 g/ 50mL Intravenous Solution 25 g, 25 g, Intravenous, Q15 Min PRN, Lisa Umanzor APRN  •  dextrose (GLUTOSE) oral gel 15 g, 15 g, Oral, Q15 Min PRN, Lisa Umanzor APRN  •  dextrose 5 % and sodium chloride 0.45 % infusion, 75 mL/hr, Intravenous, Continuous, Lisa Umanzor APRN, Last Rate: 75 mL/hr at 01/21/20 1034, 75 mL/hr at 01/21/20 1034  •  docusate sodium (COLACE) capsule 100 mg, 100 mg, Oral, BID, Lisa Umnazor APRN, 100 mg at 01/21/20 0829  •  enoxaparin (LOVENOX) syringe 40 mg, 40 mg, Subcutaneous, Q24H, Lisa Umanzor APRN, 40 mg at 01/21/20 1425  •  glucagon (human recombinant) (GLUCAGEN DIAGNOSTIC) injection 1 mg, 1 mg, Subcutaneous, Q15 Min PRN, Lisa Umanzor APRN  •  Glycerin (Laxative) enema 7.5 mL, 7.5 mL, Rectal, Daily PRN, Colleen Maldonado MD, 7.5 mL at 01/20/20 1930  •  hydrALAZINE (APRESOLINE) injection 10 mg, 10 mg, Intravenous, Q6H PRN, Lisa Umanzor APRN  •  hydrocerin (EUCERIN) cream, , Topical, BID, Lisa Umanzor APRN  •  insulin lispro (humaLOG) injection 0-7 Units, 0-7 Units,  Subcutaneous, 4x Daily With Meals & Nightly, Lisa Umanzor APRN  •  latanoprost (XALATAN) 0.005 % ophthalmic solution 1 drop, 1 drop, Both Eyes, Nightly, Lisa Umanzor APRN, 1 drop at 01/20/20 2104  •  metoclopramide (REGLAN) injection 10 mg, 10 mg, Intravenous, Q6H, Cynthia, Jimmy Archibald MD, 10 mg at 01/21/20 1000  •  nitroglycerin (NITROSTAT) SL tablet 0.4 mg, 0.4 mg, Sublingual, Q5 Min PRN, Dian Orellana APRN  •  piperacillin-tazobactam (ZOSYN) 3.375 g in iso-osmotic dextrose 50 ml (premix), 3.375 g, Intravenous, Q8H, Lisa Umanzor APRN, 3.375 g at 01/21/20 1034  •  polyethylene glycol (MIRALAX) powder 17 g, 17 g, Oral, BID, Lisa Umanzor APRN, 17 g at 01/21/20 0829  •  promethazine (PHENERGAN) injection 12.5 mg, 12.5 mg, Intravenous, Q6H PRN, Lisa Umanzor APRN  •  [COMPLETED] Insert peripheral IV, , , Once **AND** sodium chloride 0.9 % flush 10 mL, 10 mL, Intravenous, PRN, Sabas Steel MD  •  sodium chloride 0.9 % flush 10 mL, 10 mL, Intravenous, Q12H, Dian Orellana APRN, 10 mL at 01/21/20 0829  •  sodium chloride 0.9 % flush 10 mL, 10 mL, Intravenous, PRN, Dian Orellana APRN  •  tamsulosin (FLOMAX) 24 hr capsule 0.4 mg, 0.4 mg, Oral, Nightly, Lisa Umanzor APRN, 0.4 mg at 01/20/20 2104  Review of Systems:     The following systems were reviewed and negative: Constitution, pulmonary, cardiac, gastrointestinal, genitourinary      Objective     Vital Signs  Temp:  [97.6 °F (36.4 °C)-98.1 °F (36.7 °C)] 97.9 °F (36.6 °C)  Heart Rate:  [60] 60  Resp:  [16-20] 16  BP: (140-147)/(64-76) 140/76  Body mass index is 27.92 kg/m².    Intake/Output Summary (Last 24 hours) at 1/21/2020 1520  Last data filed at 1/21/2020 1440  Gross per 24 hour   Intake 2890 ml   Output 1561 ml   Net 1329 ml     I/O this shift:  In: 1410 [P.O.:410; I.V.:1000]  Out: 420 [Urine:420]     Physical Exam:   General: patient awake, alert, frail appearing   Eyes: Normal lids and lashes, no scleral  icterus   Neck: supple, normal ROM   Skin: warm and dry, not jaundiced   Cardiovascular: regular rhythm and rate, no murmurs auscultated   Pulm: clear to auscultation bilaterally, regular and unlabored   Abdomen: soft, nontender, nondistended; normal bowel sounds   Rectal: deferred   Extremities: no rash or edema   Psychiatric: Normal mood and behavior; unclear memory, unclear how much she is comprehending my conversation with him     Results Review:     I reviewed the patient's new clinical results.    Results from last 7 days   Lab Units 01/21/20  0542 01/20/20  1158 01/19/20  0750   WBC 10*3/mm3 7.83 9.49 19.16*   HEMOGLOBIN g/dL 9.2* 8.7* 10.0*   HEMATOCRIT % 27.5* 26.5* 29.8*   PLATELETS 10*3/mm3 313 324 364     Results from last 7 days   Lab Units 01/20/20  1023 01/19/20  0750 01/18/20  2350   SODIUM mmol/L 139 146* 148*   POTASSIUM mmol/L 4.2 4.4 4.5   CHLORIDE mmol/L 102 106 107   CO2 mmol/L 28.3 29.3* 29.4*   BUN mg/dL 17 23 25*   CREATININE mg/dL 0.83 0.88 1.00   CALCIUM mg/dL 8.8 9.6 9.6   BILIRUBIN mg/dL 0.3  --  <0.2*   ALK PHOS U/L 126*  --  167*   ALT (SGPT) U/L 9  --  16   AST (SGOT) U/L 17  --  25   GLUCOSE mg/dL 102* 84 97         Lab Results   Lab Value Date/Time    LIPASE 40 01/18/2020 2350    LIPASE 21 05/13/2019 1657       Radiology:  XR Abdomen KUB   Final Result      CT Abdomen Pelvis With Contrast   Final Result       1. Marked distention of the stomach. Duodenum appears unremarkable, and   there is no evidence of bowel obstruction. Findings may reflect   gastroparesis/gastric ileus. There is patulous and fluid-filled   appearance to the distal esophagus, favored to be secondary to stasis   within the stomach. Patient may benefit from nasogastric decompression.   While there is no convincing evidence of mechanical small bowel   obstruction on the patient does have a large volume of fecal material   throughout the colon, suggesting constipation.   2. Slight interval increase in bibasilar  consolidation. This may   represent atelectasis, although pneumonia is not excluded. Correlation   with clinical presentation is recommended. Persistent bilateral pleural   effusions are noted.       Radiation dose reduction techniques were utilized, including automated   exposure control and exposure modulation based on body size.       This report was finalized on 1/19/2020 1:23 AM by Dr. Kitty Moran M.D.          XR Chest 1 View   Final Result       1. Cardiomegaly and vascular congestion, new when compared to prior   exam.   2. Worsening bibasilar atelectasis.   3. Marked gaseous distention of bowel seen within the upper abdomen.       This report was finalized on 1/19/2020 12:33 AM by Dr. Kitty Moran M.D.              Assessment/Plan     Patient Active Problem List   Diagnosis   • Acute pyelonephritis   • HTN (hypertension)   • Type 2 diabetes mellitus, without long-term current use of insulin (CMS/HCC)   • Elevated alkaline phosphatase level   • Aortic stenosis   • Acute UTI   • Wandering atrial pacemaker   • Elevated troponin   • Sepsis without acute organ dysfunction (CMS/HCC)   • History of noncompliance with medical treatment   • Bacteremia due to Escherichia coli   • Left lower lobe pulmonary infiltrate   • Non-traumatic rhabdomyolysis   • Lesion of left native kidney   • Constipation   • Acute abdominal pain   • Atrial flutter (CMS/HCC)   • Hypernatremia   • Pleural effusion   • Hypoglycemia   • Gastric distention   • Aspiration pneumonia (CMS/HCC)       Impression  1. Gastric distension: Resolved on imaging    2. Abnormal CT abdomen: Gastric distention, constipation    3. Abdominal pain: Improved    4. Elevated troponin, atrial flutter, aortic stenosis    5. Stool burden on imaging: Pooping with bowel regimen      Plan  Continue bowel regimen  Advance diet as tolerated  Esophagram tomorrow to further assess abnormality seen on the CT scan  Stop Reglan; this is not ideal in his age group  and I want to make sure he has good gastric motility without this medication    I discussed the patients findings and my recommendations with patient and nursing staff.    Colleen Maldonado MD

## 2020-01-21 NOTE — PLAN OF CARE
Problem: Pain, Acute (Adult)  Goal: Acceptable Pain Control/Comfort Level  Outcome: Ongoing (interventions implemented as appropriate)  Flowsheets (Taken 1/21/2020 0419)  Acceptable Pain Control/Comfort Level: making progress toward outcome     Problem: Skin Injury Risk (Adult)  Goal: Skin Health and Integrity  Outcome: Ongoing (interventions implemented as appropriate)  Flowsheets (Taken 1/21/2020 0419)  Skin Health and Integrity: making progress toward outcome     Problem: Fall Risk (Adult)  Goal: Absence of Fall  Outcome: Ongoing (interventions implemented as appropriate)  Flowsheets (Taken 1/21/2020 0419)  Absence of Fall: making progress toward outcome     Problem: Patient Care Overview  Goal: Plan of Care Review  Outcome: Ongoing (interventions implemented as appropriate)  Flowsheets (Taken 1/21/2020 0419)  Progress: no change  Plan of Care Reviewed With: patient  Outcome Summary: Patient alert and oriented. VSS. Denies pain. Abdomen remains soft, nondistended. Bowel regimen continued. Refusing tap water enema at this time, but no BM noted during this shift. Turned q2h. No nausea. IV abx continued. Urinated on his own x 2- PVR recorded. No acute distress. Will continue to monitor,

## 2020-01-21 NOTE — PLAN OF CARE
Problem: Pain, Acute (Adult)  Goal: Acceptable Pain Control/Comfort Level  Outcome: Ongoing (interventions implemented as appropriate)  Flowsheets (Taken 1/21/2020 1716)  Acceptable Pain Control/Comfort Level: making progress toward outcome     Problem: Skin Injury Risk (Adult)  Goal: Skin Health and Integrity  Outcome: Ongoing (interventions implemented as appropriate)  Flowsheets (Taken 1/21/2020 1716)  Skin Health and Integrity: making progress toward outcome     Problem: Fall Risk (Adult)  Goal: Absence of Fall  Outcome: Ongoing (interventions implemented as appropriate)  Flowsheets (Taken 1/21/2020 1716)  Absence of Fall: making progress toward outcome     Problem: Urinary Elimination/Continence Impairment (IRF) (Adult)  Goal: Optimal Management of Bladder Program, Effective Elimination  Outcome: Ongoing (interventions implemented as appropriate)  Goal: Eliminate/Minimize Urinary Incontinence Episodes  Outcome: Ongoing (interventions implemented as appropriate)     Problem: Patient Care Overview  Goal: Plan of Care Review  Outcome: Ongoing (interventions implemented as appropriate)  Flowsheets (Taken 1/21/2020 1716)  Outcome Summary: Patient alert. Patient follows all commands. Patient tolerating diet. No complaints of pain or nausea. Patient had a large bowel movement this afternoon. MD aware. PT worked with patient. Patient up in chair. On iv antibiotic. Patient urinated twice this shift. No s/s of acute distress. Will continue to monitor.  Goal: Individualization and Mutuality  Outcome: Ongoing (interventions implemented as appropriate)  Goal: Discharge Needs Assessment  Outcome: Ongoing (interventions implemented as appropriate)  Goal: Interprofessional Rounds/Family Conf  Outcome: Ongoing (interventions implemented as appropriate)

## 2020-01-21 NOTE — THERAPY TREATMENT NOTE
Patient Name: Raymon Solis  : 1940    MRN: [id number]                              Today's Date: 2020       Admit Date: 2020    Visit Dx:     ICD-10-CM ICD-9-CM   1. Acute abdominal pain R10.9 789.00     338.19   2. Lactic acidosis E87.2 276.2   3. Intractable vomiting with nausea, unspecified vomiting type R11.2 536.2   4. History of UTI Z87.440 V13.02   5. History of pneumonia Z87.01 V12.61   6. Recurrent Hypoglycemia E16.2 251.2   7. Gastric distention K31.89 536.8     Patient Active Problem List   Diagnosis   • Acute pyelonephritis   • HTN (hypertension)   • Type 2 diabetes mellitus, without long-term current use of insulin (CMS/HCC)   • Elevated alkaline phosphatase level   • Aortic stenosis   • Acute UTI   • Wandering atrial pacemaker   • Elevated troponin   • Sepsis without acute organ dysfunction (CMS/HCC)   • History of noncompliance with medical treatment   • Bacteremia due to Escherichia coli   • Left lower lobe pulmonary infiltrate   • Non-traumatic rhabdomyolysis   • Lesion of left native kidney   • Constipation   • Acute abdominal pain   • Atrial flutter (CMS/HCC)   • Hypernatremia   • Pleural effusion   • Hypoglycemia   • Gastric distention   • Aspiration pneumonia (CMS/HCC)     Past Medical History:   Diagnosis Date   • Aortic stenosis    • Bacteremia due to Escherichia coli    • Diabetes mellitus (CMS/HCC)    • Hypertension    • UTI (urinary tract infection)      Past Surgical History:   Procedure Laterality Date   • CATARACT EXTRACTION, BILATERAL       General Information     Row Name 20 1629          PT Evaluation Time/Intention    Document Type  therapy note (daily note)  -     Mode of Treatment  physical therapy;individual therapy  -     Row Name 20 1629          Cognitive Assessment/Intervention- PT/OT    Orientation Status (Cognition)  oriented to;person;place  -     Row Name 20 1629          Safety Issues, Functional Mobility    Safety Issues  Affecting Function (Mobility)  insight into deficits/self awareness  -     Impairments Affecting Function (Mobility)  endurance/activity tolerance;balance;strength  -       User Key  (r) = Recorded By, (t) = Taken By, (c) = Cosigned By    Initials Name Provider Type     An Rosa PTA Physical Therapy Assistant        Mobility     Row Name 01/21/20 1630          Bed Mobility Assessment/Treatment    Supine-Sit [place] (Bed Mobility)  minimum assist (75% patient effort)  -     Assistive Device (Bed Mobility)  bed rails;head of bed elevated  -     Row Name 01/21/20 1630          Sit-Stand Transfer    Sit-Stand [place] (Transfers)  contact guard;verbal cues  -     Assistive Device (Sit-Stand Transfers)  walker, front-wheeled  -     Row Name 01/21/20 1630          Gait/Stairs Assessment/Training    [place] Level (Gait)  contact guard;1 person to manage equipment  -     Assistive Device (Gait)  walker, front-wheeled  -     Distance in Feet (Gait)  pt took a few steps from the bed to chair.  Pt agreeable to ambulate to chair but no further  -     Deviations/Abnormal Patterns (Gait)  stride length decreased  -     Bilateral Gait Deviations  forward flexed posture  -       User Key  (r) = Recorded By, (t) = Taken By, (c) = Cosigned By    Initials Name Provider Type     An Rosa PTA Physical Therapy Assistant        Obj/Interventions     Row Name 01/21/20 1632          Static Sitting Balance    Level of [place] (Unsupported Sitting, Static Balance)  contact guard assist  -     Sitting Position (Unsupported Sitting, Static Balance)  sitting on edge of bed  -     Time Able to Maintain Position (Unsupported Sitting, Static Balance)  1 to 2 minutes  -     Row Name 01/21/20 1632          Static Standing Balance    Level of [place] (Supported Standing, Static Balance)  contact guard assist  -     Time Able to Maintain Position (Supported Standing, Static Balance)   2 to 3 minutes  -     Assistive Device Utilized (Supported Standing, Static Balance)  walker, rolling  -EH     Comment (Supported Standing, Static Balance)  pt stood for toileting needs.   -       User Key  (r) = Recorded By, (t) = Taken By, (c) = Cosigned By    Initials Name Provider Type     An Rosa PTA Physical Therapy Assistant        Goals/Plan    No documentation.       Clinical Impression     Row Name 01/21/20 1633          Plan of Care Review    Plan of Care Reviewed With  patient  -EH     Progress  improving  -     Outcome Summary  Pt tolerated treatment with no complaints. Pt requires a lot of encouragement to participate with PT. Pt agreed to sitting up in chair today. Pt is Ronni for bed mobility with verbal cueing. Pt able to sit on EOB with CGA. Pt stood for several minutes with CGA for toileting needs and took a few steps with from bed to chair with CGA, with rwx. Pt required safety cues when transferring as pt tryring to sit before getting close enough to the chair.  Pt declined further ambulation.  Will continue to progress pt as able.   -     Row Name 01/21/20 1633          Positioning and Restraints    Pre-Treatment Position  in bed  -EH     Post Treatment Position  chair  -EH     In Chair  reclined;call light within reach;notified nsg;encouraged to call for assist  -EH       User Key  (r) = Recorded By, (t) = Taken By, (c) = Cosigned By    Initials Name Provider Type     An Rosa PTA Physical Therapy Assistant        Outcome Measures     Row Name 01/21/20 9236          How much help from another person do you currently need...    Turning from your back to your side while in flat bed without using bedrails?  3  -EH     Moving from lying on back to sitting on the side of a flat bed without bedrails?  3  -EH     Moving to and from a bed to a chair (including a wheelchair)?  3  -EH     Standing up from a chair using your arms (e.g., wheelchair, bedside chair)?  3  -EH      Climbing 3-5 steps with a railing?  1  -     To walk in hospital room?  2  -EH     AM-PAC 6 Clicks Score (PT)  15  -       User Key  (r) = Recorded By, (t) = Taken By, (c) = Cosigned By    Initials Name Provider Type     An Rosa PTA Physical Therapy Assistant          PT Recommendation and Plan     Plan of Care Reviewed With: patient  Progress: improving  Outcome Summary: Pt tolerated treatment with no complaints. Pt requires a lot of encouragement to participate with PT. Pt agreed to sitting up in chair today. Pt is Ronni for bed mobility with verbal cueing. Pt able to sit on EOB with CGA. Pt stood for several minutes with CGA for toileting needs and took a few steps with from bed to chair with CGA, with rwx. Pt required safety cues when transferring as pt tryring to sit before getting close enough to the chair.  Pt declined further ambulation.  Will continue to progress pt as able.      Time Calculation:   PT Charges     Row Name 01/21/20 1629             Time Calculation    Start Time  1559  -      Stop Time  1612  -      Time Calculation (min)  13 min  -      PT Received On  01/21/20  -      PT - Next Appointment  01/22/20  -         Time Calculation- PT    Total Timed Code Minutes- PT  13 minute(s)  -        User Key  (r) = Recorded By, (t) = Taken By, (c) = Cosigned By    Initials Name Provider Type     An Rosa PTA Physical Therapy Assistant        Therapy Charges for Today     Code Description Service Date Service Provider Modifiers Qty    86400015237 HC PT THER PROC EA 15 MIN 1/21/2020 An Rosa PTA GP 1    98026621512 HC PT THER SUPP EA 15 MIN 1/21/2020 An Rosa PTA GP 1          PT G-Codes  Outcome Measure Options: AM-PAC 6 Clicks Basic Mobility (PT)  AM-PAC 6 Clicks Score (PT): 15    An Rosa PTA  1/21/2020

## 2020-01-21 NOTE — PROGRESS NOTES
Progress Note    Name: Raymon Solis ADMIT: 2020   : 1940  PCP: Magali Burgess MD    MRN: [id number] LOS: 2 days   AGE/SEX: 79 y.o. male  ROOM: E652/1   Date of Encounter Visit: 20    Subjective:     Interval History: cardiology note reviewed. NG tube was removed. Overall feels better.     REVIEW OF SYSTEMS:   no fever or chills.  No chest pain, palpitations..   No shortness of breath or cough.   No nausea, vomiting or diarrhea. Small BM yesterday.     Objective:   Temp:  [97.6 °F (36.4 °C)-98.1 °F (36.7 °C)] 97.9 °F (36.6 °C)  Heart Rate:  [60] 60  Resp:  [16-20] 16  BP: (140-147)/(64-76) 140/76   SpO2:  [98 %] 98 %  on    Device (Oxygen Therapy): room air    Intake/Output Summary (Last 24 hours) at 2020 1802  Last data filed at 2020 1750  Gross per 24 hour   Intake 2650 ml   Output 1986 ml   Net 664 ml     Body mass index is 27.92 kg/m².      20  2348 20  1707   Weight: 75.8 kg (167 lb) 76 kg (167 lb 8.8 oz)     Weight change:     Physical Exam   Constitutional: He appears well-developed. No distress.   HENT:   Head: Atraumatic.   Nose: Nose normal.   Eyes: Conjunctivae are normal.   Neck: Neck supple. No tracheal deviation present.   Cardiovascular: Normal rate. An irregularly irregular rhythm present.   Murmur heard.  Pulmonary/Chest: Effort normal. He has no wheezes.   Diminished bases   Abdominal: Soft. Bowel sounds are normal. He exhibits no distension. There is tenderness (epigastric). There is no guarding.   Musculoskeletal: He exhibits edema (1+ BLE).   Neurological: He is alert.   Skin: Skin is warm and dry.   Nursing note and vitals reviewed.      Results Review:      Results from last 7 days   Lab Units 20  1023 20  0750 20  2350 01/15/20  1838   SODIUM mmol/L 139 146* 148* 134*   POTASSIUM mmol/L 4.2 4.4 4.5 4.3   CHLORIDE mmol/L 102 106 107 98   CO2 mmol/L 28.3 29.3* 29.4* 27.3   BUN mg/dL 17 23 25* 18   CREATININE mg/dL 0.83 0.88 1.00  0.76   GLUCOSE mg/dL 102* 84 97 153*   CALCIUM mg/dL 8.8 9.6 9.6 9.1   AST (SGOT) U/L 17  --  25  --    ALT (SGPT) U/L 9  --  16  --      Estimated Creatinine Clearance: 68.6 mL/min (by C-G formula based on SCr of 0.83 mg/dL).      Results from last 7 days   Lab Units 01/21/20  1711 01/21/20  1048 01/21/20  0600 01/20/20  2019 01/20/20  1646 01/20/20  1121 01/20/20  0605 01/19/20 2019   GLUCOSE mg/dL 194* 118 108 102 136* 113 96 80     Results from last 7 days   Lab Units 01/19/20  0750 01/18/20  2350   TROPONIN T ng/mL 0.056* 0.064*                   Invalid input(s):  PHOS        Invalid input(s): LDLCALC  Results from last 7 days   Lab Units 01/21/20  0542 01/20/20  1158 01/19/20  0750 01/18/20  2350  01/15/20  1556   WBC 10*3/mm3 7.83 9.49 19.16* 12.84*  --  8.25   HEMOGLOBIN g/dL 9.2* 8.7* 10.0* 9.9*  --  10.1*   HEMATOCRIT % 27.5* 26.5* 29.8* 30.0*  --  30.8*   PLATELETS 10*3/mm3 313 324 364 354  --  302   MCV fL 88.7 90.8 87.6 88.8  --  88.8   MCH pg 29.7 29.8 29.4 29.3  --  29.1   MCHC g/dL 33.5 32.8 33.6 33.0  --  32.8   RDW % 13.2 13.2 13.3 13.4  --  12.8   RDW-SD fl 42.2 43.5 41.6 43.4  --  41.1   MPV fL 8.9 8.8 8.7 8.9  --  9.0   NEUTROPHIL % %  --   --  86.1* 79.8*   < >  --    LYMPHOCYTE % %  --   --  6.6* 11.4*   < >  --    MONOCYTES % %  --   --  5.8 7.0   < >  --    EOSINOPHIL % %  --   --  0.4 0.8   < >  --    BASOPHIL % %  --   --  0.4 0.5   < >  --    IMM GRAN % %  --   --  0.7* 0.5   < >  --    NEUTROS ABS 10*3/mm3  --   --  16.51* 10.25*   < >  --    LYMPHS ABS 10*3/mm3  --   --  1.26 1.46   < >  --    MONOS ABS 10*3/mm3  --   --  1.11* 0.90   < >  --    EOS ABS 10*3/mm3  --   --  0.07 0.10   < >  --    BASOS ABS 10*3/mm3  --   --  0.07 0.07   < >  --    IMMATURE GRANS (ABS) 10*3/mm3  --   --  0.14* 0.06*   < >  --    NRBC /100 WBC  --   --  0.0 0.0   < >  --     < > = values in this interval not displayed.             Results from last 7 days   Lab Units 01/19/20  0409 01/18/20  5548  01/18/20  2350   PROCALCITONIN ng/mL  --   --  0.14   LACTATE mmol/L 1.7 2.9*  --          Results from last 7 days   Lab Units 01/18/20  2350   LIPASE U/L 40     Results from last 7 days   Lab Units 01/19/20  1239 01/19/20  1204 01/19/20  0031   BLOODCX  No growth at 2 days No growth at 2 days  --    URINECX   --   --  No growth         Results from last 7 days   Lab Units 01/19/20  0031   NITRITE UA  Negative   WBC UA /HPF 13-20*   BACTERIA UA /HPF None Seen   SQUAM EPITHEL UA /HPF 0-2   URINECX  No growth           Imaging:  Imaging Results (Last 24 Hours)     ** No results found for the last 24 hours. **          I reviewed the patient's new clinical results and medications.         Medication Review:   Scheduled Meds:    aspirin 81 mg Oral Daily   brimonidine 1 drop Both Eyes TID   docusate sodium 100 mg Oral BID   enoxaparin 40 mg Subcutaneous Q24H   hydrocerin  Topical BID   insulin lispro 0-7 Units Subcutaneous 4x Daily With Meals & Nightly   latanoprost 1 drop Both Eyes Nightly   piperacillin-tazobactam 3.375 g Intravenous Q8H   polyethylene glycol 17 g Oral BID   sodium chloride 10 mL Intravenous Q12H   tamsulosin 0.4 mg Oral Nightly     Continuous Infusions:    dextrose 5 % and sodium chloride 0.45 % 75 mL/hr Last Rate: 75 mL/hr (01/21/20 1034)     PRN Meds:.•  acetaminophen **OR** acetaminophen **OR** acetaminophen  •  bisacodyl  •  dextrose  •  dextrose  •  glucagon (human recombinant)  •  Glycerin (Laxative)  •  hydrALAZINE  •  nitroglycerin  •  promethazine  •  [COMPLETED] Insert peripheral IV **AND** sodium chloride  •  sodium chloride    Problem List:     Active Hospital Problems    Diagnosis  POA   • Aspiration pneumonia (CMS/HCC) [J69.0]  Unknown   • Acute abdominal pain [R10.9]  Yes   • Atrial flutter (CMS/HCC) [I48.92]  Unknown   • Hypernatremia [E87.0]  Unknown   • Pleural effusion [J90]  Unknown   • Hypoglycemia [E16.2]  Unknown   • Gastric distention [K31.89]  Unknown   • Constipation  [K59.00]  Yes   • Sepsis without acute organ dysfunction (CMS/HCC) [A41.9]  Unknown   • Elevated troponin [R79.89]  Yes   • Aortic stenosis [I35.0]  Yes   • HTN (hypertension) [I10]  Yes   • Type 2 diabetes mellitus, without long-term current use of insulin (CMS/HCC) [E11.9]  Yes      Resolved Hospital Problems   No resolved problems to display.       Assessment and Plan:     Patient is a 79-year-old male with history of aortic stenosis, HTN, diabetes with recent admission for E. coli bacteremia due to UTI.  Patient was admitted for hypoglycemia and abdominal pain with possible gastroparesis or gastric ileus.        Acute abdominal pain/gastris distention/Constipation  -NG tube removed. Appreciate GI and surgery input  -full liquids  -MiraLAX  -resume oral bowel regimen and continue PRN suppository or enema.        Sepsis without acute organ dysfunction (CMS/HCC)/ aspiration pneumonia  -likely due to abdominal infection with possible ileus vs aspiration pneumonia. NGTD on cultures  -continue IV zosyn  -continue IVF, but reduce to 75 ml/hr  - send blood cultures then start IV zosyn       Atrial flutter (CMS/HCC)/ AS/ elevated troponin  -Rate controlled. Troponin trended down and no further workup per cards  - moderate AS stable.   -may consider adding Eliquis 5 BID once GI issues improve and anemia stable. Aspirin and lovenox      Hypernatremia  -resolved, on d5 1/2 ns       Pleural effusion  -bilateral noted on CT. No hypoxia. Will monitor       Type 2 diabetes mellitus, without long-term current use of insulin/ hypoglycemia  -hypoglycemia on admit likely related to decreased oral intake and on sulfonylurea.  -Hold oral hypoglycemics  -PRN SS insulin       HTN (hypertension)  -BP currently stable.    -PRN hydralazine for systolic greater than 180     VTE prophylaxis: Lovenox 40 mg SC daily  CODE status: full code  Disposition: TBD    I discussed the patients findings and my recommendations with patient.    Jimmy  GIANA Lee MD  01/21/20  6:02 PM

## 2020-01-21 NOTE — NURSING NOTE
"Patient denies abdominal pain. Abdomen soft, non-distended. KUB showing a lot of stool. Dayshift RN gave dulcolax and Glycerin enema given early in shift with no results. Miralax and colace given tonight. Explained and offered tap water enema to patient. He is refusing at this time as he is in no discomfort and \"wants to give the medicine time to work.\" Will continue to monitor.   "

## 2020-01-22 ENCOUNTER — APPOINTMENT (OUTPATIENT)
Dept: GENERAL RADIOLOGY | Facility: HOSPITAL | Age: 80
End: 2020-01-22

## 2020-01-22 LAB
ANION GAP SERPL CALCULATED.3IONS-SCNC: 11.9 MMOL/L (ref 5–15)
BUN BLD-MCNC: 12 MG/DL (ref 8–23)
BUN/CREAT SERPL: 14.8 (ref 7–25)
CALCIUM SPEC-SCNC: 9 MG/DL (ref 8.6–10.5)
CHLORIDE SERPL-SCNC: 100 MMOL/L (ref 98–107)
CO2 SERPL-SCNC: 25.1 MMOL/L (ref 22–29)
CREAT BLD-MCNC: 0.81 MG/DL (ref 0.76–1.27)
DEPRECATED RDW RBC AUTO: 42.3 FL (ref 37–54)
ERYTHROCYTE [DISTWIDTH] IN BLOOD BY AUTOMATED COUNT: 13.4 % (ref 12.3–15.4)
GFR SERPL CREATININE-BSD FRML MDRD: 111 ML/MIN/1.73
GFR SERPL CREATININE-BSD FRML MDRD: 92 ML/MIN/1.73
GLUCOSE BLD-MCNC: 96 MG/DL (ref 65–99)
GLUCOSE BLDC GLUCOMTR-MCNC: 128 MG/DL (ref 70–130)
GLUCOSE BLDC GLUCOMTR-MCNC: 151 MG/DL (ref 70–130)
GLUCOSE BLDC GLUCOMTR-MCNC: 164 MG/DL (ref 70–130)
GLUCOSE BLDC GLUCOMTR-MCNC: 96 MG/DL (ref 70–130)
HCT VFR BLD AUTO: 26.7 % (ref 37.5–51)
HGB BLD-MCNC: 9 G/DL (ref 13–17.7)
MCH RBC QN AUTO: 29.5 PG (ref 26.6–33)
MCHC RBC AUTO-ENTMCNC: 33.7 G/DL (ref 31.5–35.7)
MCV RBC AUTO: 87.5 FL (ref 79–97)
MRSA DNA SPEC QL NAA+PROBE: NORMAL
PLATELET # BLD AUTO: 319 10*3/MM3 (ref 140–450)
PMV BLD AUTO: 8.8 FL (ref 6–12)
POTASSIUM BLD-SCNC: 3.6 MMOL/L (ref 3.5–5.2)
PROCALCITONIN SERPL-MCNC: 0.11 NG/ML (ref 0.1–0.25)
RBC # BLD AUTO: 3.05 10*6/MM3 (ref 4.14–5.8)
SODIUM BLD-SCNC: 137 MMOL/L (ref 136–145)
WBC NRBC COR # BLD: 7.34 10*3/MM3 (ref 3.4–10.8)

## 2020-01-22 PROCEDURE — 82962 GLUCOSE BLOOD TEST: CPT

## 2020-01-22 PROCEDURE — 85027 COMPLETE CBC AUTOMATED: CPT | Performed by: NURSE PRACTITIONER

## 2020-01-22 PROCEDURE — 74220 X-RAY XM ESOPHAGUS 1CNTRST: CPT

## 2020-01-22 PROCEDURE — 87641 MR-STAPH DNA AMP PROBE: CPT | Performed by: HOSPITALIST

## 2020-01-22 PROCEDURE — 84145 PROCALCITONIN (PCT): CPT | Performed by: HOSPITALIST

## 2020-01-22 PROCEDURE — 25010000002 PIPERACILLIN SOD-TAZOBACTAM PER 1 G: Performed by: NURSE PRACTITIONER

## 2020-01-22 PROCEDURE — 80048 BASIC METABOLIC PNL TOTAL CA: CPT | Performed by: HOSPITALIST

## 2020-01-22 PROCEDURE — 99232 SBSQ HOSP IP/OBS MODERATE 35: CPT | Performed by: INTERNAL MEDICINE

## 2020-01-22 PROCEDURE — 25010000002 ENOXAPARIN PER 10 MG: Performed by: NURSE PRACTITIONER

## 2020-01-22 PROCEDURE — 63710000001 INSULIN LISPRO (HUMAN) PER 5 UNITS: Performed by: NURSE PRACTITIONER

## 2020-01-22 PROCEDURE — 25010000002 PIPERACILLIN SOD-TAZOBACTAM PER 1 G: Performed by: HOSPITALIST

## 2020-01-22 RX ADMIN — BRIMONIDINE TARTRATE 1 DROP: 2 SOLUTION OPHTHALMIC at 21:51

## 2020-01-22 RX ADMIN — DOCUSATE SODIUM 100 MG: 100 CAPSULE, LIQUID FILLED ORAL at 08:13

## 2020-01-22 RX ADMIN — DEXTROSE AND SODIUM CHLORIDE 75 ML/HR: 5; 450 INJECTION, SOLUTION INTRAVENOUS at 12:14

## 2020-01-22 RX ADMIN — TAZOBACTAM SODIUM AND PIPERACILLIN SODIUM 3.38 G: 375; 3 INJECTION, SOLUTION INTRAVENOUS at 03:35

## 2020-01-22 RX ADMIN — BRIMONIDINE TARTRATE 1 DROP: 2 SOLUTION OPHTHALMIC at 15:25

## 2020-01-22 RX ADMIN — TAZOBACTAM SODIUM AND PIPERACILLIN SODIUM 3.38 G: 375; 3 INJECTION, SOLUTION INTRAVENOUS at 19:03

## 2020-01-22 RX ADMIN — ENOXAPARIN SODIUM 40 MG: 40 INJECTION SUBCUTANEOUS at 15:25

## 2020-01-22 RX ADMIN — POLYETHYLENE GLYCOL 3350 17 G: 17 POWDER, FOR SOLUTION ORAL at 08:09

## 2020-01-22 RX ADMIN — Medication: at 08:10

## 2020-01-22 RX ADMIN — DOCUSATE SODIUM 100 MG: 100 CAPSULE, LIQUID FILLED ORAL at 21:50

## 2020-01-22 RX ADMIN — INSULIN LISPRO 2 UNITS: 100 INJECTION, SOLUTION INTRAVENOUS; SUBCUTANEOUS at 17:26

## 2020-01-22 RX ADMIN — BRIMONIDINE TARTRATE 1 DROP: 2 SOLUTION OPHTHALMIC at 08:09

## 2020-01-22 RX ADMIN — SODIUM CHLORIDE, PRESERVATIVE FREE 10 ML: 5 INJECTION INTRAVENOUS at 08:09

## 2020-01-22 RX ADMIN — BARIUM SULFATE 183 ML: 960 POWDER, FOR SUSPENSION ORAL at 14:15

## 2020-01-22 RX ADMIN — BISACODYL 10 MG: 5 TABLET ORAL at 08:09

## 2020-01-22 RX ADMIN — INSULIN LISPRO 2 UNITS: 100 INJECTION, SOLUTION INTRAVENOUS; SUBCUTANEOUS at 21:50

## 2020-01-22 RX ADMIN — LATANOPROST 1 DROP: 50 SOLUTION OPHTHALMIC at 21:51

## 2020-01-22 RX ADMIN — Medication: at 21:56

## 2020-01-22 RX ADMIN — SODIUM CHLORIDE, PRESERVATIVE FREE 10 ML: 5 INJECTION INTRAVENOUS at 21:51

## 2020-01-22 RX ADMIN — TAZOBACTAM SODIUM AND PIPERACILLIN SODIUM 3.38 G: 375; 3 INJECTION, SOLUTION INTRAVENOUS at 10:04

## 2020-01-22 RX ADMIN — TAMSULOSIN HYDROCHLORIDE 0.4 MG: 0.4 CAPSULE ORAL at 21:49

## 2020-01-22 RX ADMIN — ASPIRIN 81 MG: 81 TABLET, COATED ORAL at 08:09

## 2020-01-22 RX ADMIN — POLYETHYLENE GLYCOL 3350 17 G: 17 POWDER, FOR SOLUTION ORAL at 21:50

## 2020-01-22 NOTE — PLAN OF CARE
Problem: Pain, Acute (Adult)  Goal: Acceptable Pain Control/Comfort Level  Outcome: Ongoing (interventions implemented as appropriate)  Flowsheets (Taken 1/22/2020 0415)  Acceptable Pain Control/Comfort Level: making progress toward outcome     Problem: Skin Injury Risk (Adult)  Goal: Skin Health and Integrity  Outcome: Ongoing (interventions implemented as appropriate)  Flowsheets (Taken 1/22/2020 0415)  Skin Health and Integrity: making progress toward outcome     Problem: Fall Risk (Adult)  Goal: Absence of Fall  Outcome: Ongoing (interventions implemented as appropriate)  Flowsheets (Taken 1/22/2020 0415)  Absence of Fall: making progress toward outcome     Problem: Patient Care Overview  Goal: Plan of Care Review  Outcome: Ongoing (interventions implemented as appropriate)  Flowsheets (Taken 1/22/2020 0415)  Progress: improving  Plan of Care Reviewed With: patient  Outcome Summary: Patient is alert and oriented. VSS. Denies pain. Abdomen soft and tender. Multiple BMs this shift. The last one was large and loose. Using the urinal this shift when prompted. Turned q2h. IV abx continued. No acute distress. Esophagram planned for this AM. Will continue to monitor.

## 2020-01-22 NOTE — PROGRESS NOTES
Progress Note    Name: Raymon Solis ADMIT: 2020   : 1940  PCP: Magali Burgess MD    MRN: [id number] LOS: 3 days   AGE/SEX: 79 y.o. male  ROOM: E652/1   Date of Encounter Visit: 20    Subjective:     Interval History: sitting up in the chair, walked to the stretcher  Ate a regular diet today    REVIEW OF SYSTEMS:   Denies any nausea vomiting or diarrhea, denies any chest pain or shortness of air    Objective:   Temp:  [97.7 °F (36.5 °C)-98.7 °F (37.1 °C)] 97.7 °F (36.5 °C)  Heart Rate:  [64-92] 92  Resp:  [16] 16  BP: (136-173)/(78-94) 136/94   SpO2:  [96 %-98 %] 97 %  on    Device (Oxygen Therapy): room air    Intake/Output Summary (Last 24 hours) at 2020 1830  Last data filed at 2020 1741  Gross per 24 hour   Intake 2415 ml   Output 1650 ml   Net 765 ml     Body mass index is 27.92 kg/m².      20  2348 20  1707   Weight: 75.8 kg (167 lb) 76 kg (167 lb 8.8 oz)     Weight change:     Physical Exam   Constitutional: He appears well-developed. No distress.   HENT:   Head: Atraumatic.   Nose: Nose normal.   Eyes: Conjunctivae are normal.   Neck: Neck supple. No tracheal deviation present.   Cardiovascular: Normal rate. An irregularly irregular rhythm present.   Murmur heard.  Pulmonary/Chest: Effort normal. He has no wheezes.   Diminished bases   Abdominal: Soft. Bowel sounds are normal. He exhibits no distension. There is tenderness (epigastric). There is no guarding.   Musculoskeletal: He exhibits edema (1+ BLE).   Neurological: He is alert.   Skin: Skin is warm and dry.   Nursing note and vitals reviewed.      Results Review:      Results from last 7 days   Lab Units 20  0551 20  1023 20  0750 20  2350 01/15/20  1838   SODIUM mmol/L 137 139 146* 148* 134*   POTASSIUM mmol/L 3.6 4.2 4.4 4.5 4.3   CHLORIDE mmol/L 100 102 106 107 98   CO2 mmol/L 25.1 28.3 29.3* 29.4* 27.3   BUN mg/dL 12 17 23 25* 18   CREATININE mg/dL 0.81 0.83 0.88 1.00 0.76    GLUCOSE mg/dL 96 102* 84 97 153*   CALCIUM mg/dL 9.0 8.8 9.6 9.6 9.1   AST (SGOT) U/L  --  17  --  25  --    ALT (SGPT) U/L  --  9  --  16  --      Estimated Creatinine Clearance: 70.3 mL/min (by C-G formula based on SCr of 0.81 mg/dL).      Results from last 7 days   Lab Units 01/22/20  1657 01/22/20  1101 01/22/20  0635 01/21/20  2027 01/21/20  1711 01/21/20  1048 01/21/20  0600 01/20/20  2019   GLUCOSE mg/dL 164* 128 96 120 194* 118 108 102     Results from last 7 days   Lab Units 01/19/20  0750 01/18/20  2350   TROPONIN T ng/mL 0.056* 0.064*                   Invalid input(s):  PHOS        Invalid input(s): LDLCALC  Results from last 7 days   Lab Units 01/22/20  0551 01/21/20  0542 01/20/20  1158 01/19/20  0750 01/18/20  2350   WBC 10*3/mm3 7.34 7.83 9.49 19.16* 12.84*   HEMOGLOBIN g/dL 9.0* 9.2* 8.7* 10.0* 9.9*   HEMATOCRIT % 26.7* 27.5* 26.5* 29.8* 30.0*   PLATELETS 10*3/mm3 319 313 324 364 354   MCV fL 87.5 88.7 90.8 87.6 88.8   MCH pg 29.5 29.7 29.8 29.4 29.3   MCHC g/dL 33.7 33.5 32.8 33.6 33.0   RDW % 13.4 13.2 13.2 13.3 13.4   RDW-SD fl 42.3 42.2 43.5 41.6 43.4   MPV fL 8.8 8.9 8.8 8.7 8.9   NEUTROPHIL % %  --   --   --  86.1* 79.8*   LYMPHOCYTE % %  --   --   --  6.6* 11.4*   MONOCYTES % %  --   --   --  5.8 7.0   EOSINOPHIL % %  --   --   --  0.4 0.8   BASOPHIL % %  --   --   --  0.4 0.5   IMM GRAN % %  --   --   --  0.7* 0.5   NEUTROS ABS 10*3/mm3  --   --   --  16.51* 10.25*   LYMPHS ABS 10*3/mm3  --   --   --  1.26 1.46   MONOS ABS 10*3/mm3  --   --   --  1.11* 0.90   EOS ABS 10*3/mm3  --   --   --  0.07 0.10   BASOS ABS 10*3/mm3  --   --   --  0.07 0.07   IMMATURE GRANS (ABS) 10*3/mm3  --   --   --  0.14* 0.06*   NRBC /100 WBC  --   --   --  0.0 0.0             Results from last 7 days   Lab Units 01/22/20  0551 01/19/20  0409 01/18/20  9834 01/18/20  2350   PROCALCITONIN ng/mL 0.11  --   --  0.14   LACTATE mmol/L  --  1.7 2.9*  --          Results from last 7 days   Lab Units 01/18/20  2919    LIPASE U/L 40     Results from last 7 days   Lab Units 01/19/20  1239 01/19/20  1204 01/19/20  0031   BLOODCX  No growth at 3 days No growth at 3 days  --    URINECX   --   --  No growth         Results from last 7 days   Lab Units 01/19/20  0031   NITRITE UA  Negative   WBC UA /HPF 13-20*   BACTERIA UA /HPF None Seen   SQUAM EPITHEL UA /HPF 0-2   URINECX  No growth           Imaging:  Imaging Results (Last 24 Hours)     Procedure Component Value Units Date/Time    FL Esophagram Complete [[id number]] Collected:  01/22/20 1615     Updated:  01/22/20 1636    Narrative:       BARIUM SWALLOW     HISTORY: Fluid-filled, distended esophagus was seen on abdomen and  pelvis CT a few days ago.     FLUOROSCOPY TIME: Thirty-eight seconds.  Eleven sets of images.     The patient was not able to stand for this exam. The study was performed  with the patient in an RPO position and the fluoroscopy table tilted up  to about 30 degrees.     The patient drank thin barium without difficulty. The barium outlines a  normal caliber esophagus throughout its length. Motility is normal. No  ulceration, stricture, or mass is identified. There is no abnormal  dilatation or other deformity along the distal esophagus. Contrast  passes into the stomach normally.       Impression:       Normal single contrast barium swallow.     This report was finalized on 1/22/2020 4:32 PM by Dr. Gallo Reese M.D.       XR Chest PA & Lateral [[id number]] Collected:  01/21/20 1953     Updated:  01/21/20 1956    Narrative:       TWO-VIEW CHEST     HISTORY: Pneumonia.     FINDINGS: There is prominent pneumonia involving a large portion of the  right lower lobe showing considerable worsening from 3 days ago. There  is some minimal atelectasis at the left base that is unchanged. The  heart remains borderline enlarged.     This report was finalized on 1/21/2020 7:53 PM by Dr. Jose Rodrigues M.D.             I reviewed the patient's new clinical results and  medications.         Medication Review:   Scheduled Meds:    aspirin 81 mg Oral Daily   brimonidine 1 drop Both Eyes TID   docusate sodium 100 mg Oral BID   enoxaparin 40 mg Subcutaneous Q24H   hydrocerin  Topical BID   insulin lispro 0-7 Units Subcutaneous 4x Daily With Meals & Nightly   latanoprost 1 drop Both Eyes Nightly   piperacillin-tazobactam 3.375 g Intravenous Q8H   polyethylene glycol 17 g Oral BID   sodium chloride 10 mL Intravenous Q12H   tamsulosin 0.4 mg Oral Nightly     Continuous Infusions:    dextrose 5 % and sodium chloride 0.45 % 75 mL/hr Last Rate: 75 mL/hr (01/22/20 1214)     PRN Meds:.•  acetaminophen **OR** acetaminophen **OR** acetaminophen  •  bisacodyl  •  dextrose  •  dextrose  •  glucagon (human recombinant)  •  Glycerin (Laxative)  •  hydrALAZINE  •  nitroglycerin  •  promethazine  •  [COMPLETED] Insert peripheral IV **AND** sodium chloride  •  sodium chloride    Problem List:     Active Hospital Problems    Diagnosis  POA   • Aspiration pneumonia (CMS/HCC) [J69.0]  Unknown   • Acute abdominal pain [R10.9]  Yes   • Atrial flutter (CMS/HCC) [I48.92]  Unknown   • Hypernatremia [E87.0]  Unknown   • Pleural effusion [J90]  Unknown   • Hypoglycemia [E16.2]  Unknown   • Gastric distention [K31.89]  Unknown   • Constipation [K59.00]  Yes   • Sepsis without acute organ dysfunction (CMS/HCC) [A41.9]  Unknown   • Elevated troponin [R79.89]  Yes   • Aortic stenosis [I35.0]  Yes   • HTN (hypertension) [I10]  Yes   • Type 2 diabetes mellitus, without long-term current use of insulin (CMS/HCC) [E11.9]  Yes      Resolved Hospital Problems   No resolved problems to display.       Assessment and Plan:     Patient is a 79-year-old male with history of aortic stenosis, HTN, diabetes with recent admission for E. coli bacteremia due to UTI.  Patient was admitted for hypoglycemia and abdominal pain with possible gastroparesis or gastric ileus.        Acute abdominal pain/gastris distention/Constipation  -NG  tube removed. Appreciate GI and surgery input  -tolerating diet  -MiraLAX  -resume oral bowel regimen and continue PRN suppository or enema.    -Esophagram normal      Sepsis without acute organ dysfunction (CMS/HCC)/ aspiration pneumonia  -likely due to abdominal infection with possible ileus vs aspiration pneumonia. NGTD on cultures  -continue IV zosyn  -X-ray is worse we will ask pulmonary to see the patient consultation and attempt sputum cx, nasal swab for MRSA screen  -Likely aspiration event just prior to admission       Atrial flutter (CMS/HCC)/ AS/ elevated troponin  -Rate controlled. Troponin trended down and no further workup per cards  - moderate AS stable.   -Cardiology recommends full dose anticoagulation if okay with primary team on 1/20.  Aspirin and lovenox for now and possible anticoagulation on discharge  -Flutter on monitor today      Hypernatremia  -resolved, on d5 1/2 ns       Pleural effusion  -bilateral noted on CT. No hypoxia. Will monitor       Type 2 diabetes mellitus, without long-term current use of insulin/ hypoglycemia  -hypoglycemia on admit likely related to decreased oral intake and on sulfonylurea.  -Hold oral hypoglycemics  -PRN SS insulin       HTN (hypertension)  -BP currently stable.    -PRN hydralazine for systolic greater than 180     VTE prophylaxis: Lovenox 40 mg SC daily  CODE status: full code  Disposition: TBD    Discussed with daughter    >35 minutes spent, > 1/2 time spent counseling and coordination of care   On pneumonia    Jimmy Lee MD  01/22/20  6:30 PM

## 2020-01-22 NOTE — CONSULTS
Patient Identification:  Raymon Solis  79 y.o.  male  1940  [id number]          LOS 3    Requesting physician: Dr Lee     Reason for Consultation:  PNA    History of Present Illness:   Thank you for this pulmonary consult.  79-year-old male presented with abdominal pain on the 19th and admitted by the hospitalist service with suspected gastroparesis/ileus.  Also had signs of aspiration pneumonia and associated sepsis.  Started on antibiotics with Zosyn and IV fluids for sepsis treatment.  We have been consulted to help with management of the pneumonia.  Was recently in the hospital with E. coli bacteremia and treated with a 10-day course of Rocephin.  Has a cough which is mostly nonproductive.  Chest x-ray shows infiltrate with worsening opacity right lower lobe compared to previous x-ray 3 days ago.    Past Medical History:  Past Medical History:   Diagnosis Date   • Aortic stenosis    • Bacteremia due to Escherichia coli    • Diabetes mellitus (CMS/HCC)    • Hypertension    • UTI (urinary tract infection)        Past Surgical History:  Past Surgical History:   Procedure Laterality Date   • CATARACT EXTRACTION, BILATERAL          Home Meds:  Medications Prior to Admission   Medication Sig Dispense Refill Last Dose   • acetaminophen (TYLENOL) 325 MG tablet Take 2 tablets by mouth every 4 (Four) hours as needed for mild pain or fever. 30 tablet 0    • amLODIPine (NORVASC) 5 MG tablet Take 1 tablet by mouth Daily. 30 tablet 0 1/18/2020 at Unknown time   • aspirin 81 MG EC tablet Take 1 tablet by mouth daily. 30 tablet 0 1/18/2020 at Unknown time   • bisacodyl (DULCOLAX) 5 MG EC tablet Take 2 tablets by mouth daily as needed for constipation. 30 tablet 0    • brimonidine (ALPHAGAN) 0.2 % ophthalmic solution Administer 1 drop to both eyes 3 (Three) Times a Day.   More than a month at Unknown time   • dextrose (GLUTOSE) 40 % gel Take contents of one tube by mouth every 15 (Fifteen) minutes as needed for  Low Blood Sugar (Blood sugar less than 70). 37.5 g 0 1/18/2020 at Unknown time   • docusate sodium 100 MG capsule Take 1 capsule by mouth 2 (Two) times a day. 30 each 0 1/18/2020 at Unknown time   • furosemide (LASIX) 40 MG tablet Take 0.5 tablets by mouth Daily. (Patient taking differently: Take 40 mg by mouth Daily.)   1/18/2020 at Unknown time   • glipiZIDE (GLUCOTROL) 10 MG tablet Take 10 mg by mouth 2 (Two) Times a Day Before Meals.   1/18/2020 at Unknown time   • latanoprost (XALATAN) 0.005 % ophthalmic solution Administer 1 drop to both eyes Every Night.   Past Week at Unknown time   • lisinopril (PRINIVIL,ZESTRIL) 10 MG tablet Take 1 tablet by mouth Daily. 30 tablet 0 1/18/2020 at Unknown time   • metFORMIN (GLUCOPHAGE) 1000 MG tablet Take 1,000 mg by mouth 2 (Two) Times a Day With Meals.   1/18/2020 at Unknown time   • polyethylene glycol (MIRALAX) powder Mix 17 grams (1 capful in liquid) and take by mouth 2 (Two) times a day. 238 g 0 1/18/2020 at Unknown time   • potassium chloride (K-DUR) 10 MEQ CR tablet Take 1 tablet by mouth Daily. 30 tablet 0 1/18/2020 at Unknown time   • Skin Protectants, Misc. (EUCERIN) cream Apply  topically to the appropriate area as directed 2 (Two) Times a Day. Applied to bilateral lower extermities      • tamsulosin (FLOMAX) 0.4 MG capsule 24 hr capsule Take 1 capsule by mouth every night. 30 capsule 0 1/18/2020 at Unknown time         Allergies:  No Known Allergies    Social History:   Social History     Socioeconomic History   • Marital status:      Spouse name: Not on file   • Number of children: Not on file   • Years of education: Not on file   • Highest education level: Not on file   Tobacco Use   • Smoking status: Never Smoker   • Smokeless tobacco: Never Used   Substance and Sexual Activity   • Alcohol use: No     Frequency: Never   • Drug use: No   • Sexual activity: Defer       Family History:  History reviewed. No pertinent family history.    Review of  "Systems:  Denies fevers or chills  Denies nausea or vomiting  No new vision or hearing changes  No chest pain  Shortness of breath and cough  Crampy abdominal pain no diarrhea, hematemesis or hematochezia, no dysuria or frequency  No musculoskeletal complaints  No heat or cold intolerance  No skin rashes  No dizziness or confusion.  No seizure activity  No new anxiety or depression  12 system review of systems performed and all else negative    Objective:    PHYSICAL EXAM:    /94 (BP Location: Right arm, Patient Position: Sitting)   Pulse 92   Temp 97.7 °F (36.5 °C) (Oral)   Resp 16   Ht 165 cm (64.96\")   Wt 76 kg (167 lb 8.8 oz)   SpO2 97%   BMI 27.92 kg/m²  Body mass index is 27.92 kg/m². 97% 76 kg (167 lb 8.8 oz)    GENERAL APPEARANCE:   · Well developed  · Well nourished  · No acute distress   EYES:    · PERRL                                                                           · Conjunctivae normal  · Sclerae nonicteric.  HENT:   · Atraumatic, normocephalic  · External ears and nose normal  · Moist mucous membranes and no ulcers  NECK:  · Thyroid not enlarged  · Trachea midline   RESPIRATORY:    · Nonlabored breathing   · Normal breath sounds  · No rales. No wheezing  · No dullness  CARDIOVASCULAR:    · RRR  · Normal S1, S2  · No murmur  · Lower extremity edema: none    GI:   · Bowel sounds normal  · Abdomen soft , nondistended, nontender  · No abdominal masses  MUSCULOSKELETAL:  · Normal movement of extremities  · No tenderness, no deformities  · No clubbing or cyanosis   Skin:    · No visible rashes  · No palpable nodules  PSYCHIATRIC:  · Speech and behavior appropriate  · Normal mood and affect  · Oriented to person, place and time  NEUROLOGIC:  Moving all extremities.  Sensation intact.    Lab Review:      Lab Results   Component Value Date    WBC 7.34 01/22/2020    HGB 9.0 (L) 01/22/2020    HCT 26.7 (L) 01/22/2020    MCV 87.5 01/22/2020     01/22/2020            Lab Results "   Component Value Date    CREATININE 0.81 01/22/2020    BUN 12 01/22/2020     01/22/2020    K 3.6 01/22/2020     01/22/2020    CO2 25.1 01/22/2020        Lab Results   Component Value Date    CKTOTAL 38 01/10/2020    CKMB 1.38 06/13/2019    TROPONINI 0.018 11/06/2019    TROPONINT 0.056 (C) 01/19/2020                Imaging reviewed  chest X-ray           Microbiology reviewed: No growth to date        Assessment:  Aspiration pneumonia/pneumonitis  Abdominal pain/gastritis  Sepsis  Atrial flutter  Aortic stenosis  Elevated troponin  Type 2 diabetes    Recommendations:  This may be more pneumonitis from aspiration than pneumonia.  Afebrile and does not have significantly elevated white count.  Procalcitonin is also not significantly elevated.  Can continue Zosyn for potential bacterial component.  Encourage pulmonary toilet.  Add Acapella valve to help with clearance.  Constipation appears to be improving.  Control glucose.      Thank you for allowing me to participate in the care of this patient.  I will continue to follow along with you.      Tyson Perdue MD  [facility], [facility]  Pulmonary and Critical Care Medicine    1/22/2020  6:48 PM

## 2020-01-22 NOTE — PROGRESS NOTES
[facility]  Inpatient Progress Note    Reason for Follow Up:  Gastric distension on imaging    Subjective     Interval History:   Patient eating a little bit.  Had his esophagram, results are pending.  Stooling well.  No abdominal pain    Current Facility-Administered Medications:   •  acetaminophen (TYLENOL) tablet 650 mg, 650 mg, Oral, Q4H PRN **OR** acetaminophen (TYLENOL) 160 MG/5ML solution 650 mg, 650 mg, Oral, Q4H PRN **OR** acetaminophen (TYLENOL) suppository 650 mg, 650 mg, Rectal, Q4H PRN, Dian Orellana APRN  •  aspirin EC tablet 81 mg, 81 mg, Oral, Daily, Lisa Umanzor APRN, 81 mg at 01/22/20 0809  •  bisacodyl (DULCOLAX) EC tablet 10 mg, 10 mg, Oral, Daily PRN, Lisa Umanzor APRN, 10 mg at 01/22/20 0809  •  brimonidine (ALPHAGAN) 0.2 % ophthalmic solution 1 drop, 1 drop, Both Eyes, TID, Lisa Umanzor APRN, 1 drop at 01/22/20 1525  •  dextrose (D50W) 25 g/ 50mL Intravenous Solution 25 g, 25 g, Intravenous, Q15 Min PRN, Lisa Umanzor APRN  •  dextrose (GLUTOSE) oral gel 15 g, 15 g, Oral, Q15 Min PRN, Lisa mUanzor APRN  •  dextrose 5 % and sodium chloride 0.45 % infusion, 75 mL/hr, Intravenous, Continuous, Lisa Umanzor APRN, Last Rate: 75 mL/hr at 01/22/20 1214, 75 mL/hr at 01/22/20 1214  •  docusate sodium (COLACE) capsule 100 mg, 100 mg, Oral, BID, Lisa Umanzor APRN, 100 mg at 01/22/20 0813  •  enoxaparin (LOVENOX) syringe 40 mg, 40 mg, Subcutaneous, Q24H, Lisa Umanzor APRN, 40 mg at 01/22/20 1525  •  glucagon (human recombinant) (GLUCAGEN DIAGNOSTIC) injection 1 mg, 1 mg, Subcutaneous, Q15 Min PRN, Lisa Umanzor APRN  •  Glycerin (Laxative) enema 7.5 mL, 7.5 mL, Rectal, Daily PRN, Colleen Maldonado MD, 7.5 mL at 01/20/20 1930  •  hydrALAZINE (APRESOLINE) injection 10 mg, 10 mg, Intravenous, Q6H PRN, Lisa Umanzor APRN  •  hydrocerin (EUCERIN) cream, , Topical, BID, Lisa Umanzor APRN  •  insulin lispro (humaLOG) injection 0-7 Units, 0-7  Units, Subcutaneous, 4x Daily With Meals & Nightly, Lisa Umanzor APRN, 2 Units at 01/21/20 1727  •  latanoprost (XALATAN) 0.005 % ophthalmic solution 1 drop, 1 drop, Both Eyes, Nightly, Lisa Umanzor APRN, 1 drop at 01/21/20 2014  •  nitroglycerin (NITROSTAT) SL tablet 0.4 mg, 0.4 mg, Sublingual, Q5 Min PRN, Dian Orellana APRN  •  piperacillin-tazobactam (ZOSYN) 3.375 g in iso-osmotic dextrose 50 ml (premix), 3.375 g, Intravenous, Q8H, Lisa Umanzor APRN, 3.375 g at 01/22/20 1004  •  polyethylene glycol (MIRALAX) powder 17 g, 17 g, Oral, BID, Lisa Umanzor APRN, 17 g at 01/22/20 0809  •  promethazine (PHENERGAN) injection 12.5 mg, 12.5 mg, Intravenous, Q6H PRN, Lisa Umanzor APRN  •  [COMPLETED] Insert peripheral IV, , , Once **AND** sodium chloride 0.9 % flush 10 mL, 10 mL, Intravenous, PRN, Sabas Steel MD  •  sodium chloride 0.9 % flush 10 mL, 10 mL, Intravenous, Q12H, Dian Orellana APRN, 10 mL at 01/22/20 0809  •  sodium chloride 0.9 % flush 10 mL, 10 mL, Intravenous, PRN, Dian Orellana APRN  •  tamsulosin (FLOMAX) 24 hr capsule 0.4 mg, 0.4 mg, Oral, Nightly, Lisa Umanzor APRN, 0.4 mg at 01/21/20 2013  Review of Systems:     The following systems were reviewed and negative: Constitution, pulmonary, cardiac, gastrointestinal, genitourinary      Objective     Vital Signs  Temp:  [97.7 °F (36.5 °C)-98.7 °F (37.1 °C)] 97.7 °F (36.5 °C)  Heart Rate:  [64-92] 92  Resp:  [16] 16  BP: (136-173)/(78-94) 136/94  Body mass index is 27.92 kg/m².    Intake/Output Summary (Last 24 hours) at 1/22/2020 1527  Last data filed at 1/22/2020 1320  Gross per 24 hour   Intake 2705 ml   Output 1725 ml   Net 980 ml     I/O this shift:  In: 1300 [P.O.:300; I.V.:950; IV Piggyback:50]  Out: 400 [Urine:400]     Physical Exam:   General: patient awake, alert, frail appearing   Eyes: Normal lids and lashes, no scleral icterus   Neck: supple, normal ROM   Skin: warm and dry, not  jaundiced   Cardiovascular: regular rhythm and rate, no murmurs auscultated   Pulm: clear to auscultation bilaterally, regular and unlabored   Abdomen: soft, nontender, nondistended; normal bowel sounds   Rectal: deferred   Extremities: no rash or edema   Psychiatric: Normal mood and behavior; unclear memory, unclear how much she is comprehending my conversation with him     Results Review:     I reviewed the patient's new clinical results.    Results from last 7 days   Lab Units 01/22/20  0551 01/21/20  0542 01/20/20  1158   WBC 10*3/mm3 7.34 7.83 9.49   HEMOGLOBIN g/dL 9.0* 9.2* 8.7*   HEMATOCRIT % 26.7* 27.5* 26.5*   PLATELETS 10*3/mm3 319 313 324     Results from last 7 days   Lab Units 01/22/20  0551 01/20/20  1023 01/19/20  0750 01/18/20  2350   SODIUM mmol/L 137 139 146* 148*   POTASSIUM mmol/L 3.6 4.2 4.4 4.5   CHLORIDE mmol/L 100 102 106 107   CO2 mmol/L 25.1 28.3 29.3* 29.4*   BUN mg/dL 12 17 23 25*   CREATININE mg/dL 0.81 0.83 0.88 1.00   CALCIUM mg/dL 9.0 8.8 9.6 9.6   BILIRUBIN mg/dL  --  0.3  --  <0.2*   ALK PHOS U/L  --  126*  --  167*   ALT (SGPT) U/L  --  9  --  16   AST (SGOT) U/L  --  17  --  25   GLUCOSE mg/dL 96 102* 84 97         Lab Results   Lab Value Date/Time    LIPASE 40 01/18/2020 2350    LIPASE 21 05/13/2019 1657       Radiology:  XR Chest PA & Lateral   Final Result      XR Abdomen KUB   Final Result      CT Abdomen Pelvis With Contrast   Final Result       1. Marked distention of the stomach. Duodenum appears unremarkable, and   there is no evidence of bowel obstruction. Findings may reflect   gastroparesis/gastric ileus. There is patulous and fluid-filled   appearance to the distal esophagus, favored to be secondary to stasis   within the stomach. Patient may benefit from nasogastric decompression.   While there is no convincing evidence of mechanical small bowel   obstruction on the patient does have a large volume of fecal material   throughout the colon, suggesting constipation.    2. Slight interval increase in bibasilar consolidation. This may   represent atelectasis, although pneumonia is not excluded. Correlation   with clinical presentation is recommended. Persistent bilateral pleural   effusions are noted.       Radiation dose reduction techniques were utilized, including automated   exposure control and exposure modulation based on body size.       This report was finalized on 1/19/2020 1:23 AM by Dr. Kitty Moran M.D.          XR Chest 1 View   Final Result       1. Cardiomegaly and vascular congestion, new when compared to prior   exam.   2. Worsening bibasilar atelectasis.   3. Marked gaseous distention of bowel seen within the upper abdomen.       This report was finalized on 1/19/2020 12:33 AM by Dr. Kitty Moran M.D.          FL Esophagram Complete    (Results Pending)       Assessment/Plan     Patient Active Problem List   Diagnosis   • Acute pyelonephritis   • HTN (hypertension)   • Type 2 diabetes mellitus, without long-term current use of insulin (CMS/HCC)   • Elevated alkaline phosphatase level   • Aortic stenosis   • Acute UTI   • Wandering atrial pacemaker   • Elevated troponin   • Sepsis without acute organ dysfunction (CMS/HCC)   • History of noncompliance with medical treatment   • Bacteremia due to Escherichia coli   • Left lower lobe pulmonary infiltrate   • Non-traumatic rhabdomyolysis   • Lesion of left native kidney   • Constipation   • Acute abdominal pain   • Atrial flutter (CMS/HCC)   • Hypernatremia   • Pleural effusion   • Hypoglycemia   • Gastric distention   • Aspiration pneumonia (CMS/HCC)       Impression  1. Gastric distension: Resolved on imaging    2. Abnormal CT abdomen: Gastric distention, constipation    3. Abdominal pain: Improved    4. Elevated troponin, atrial flutter, aortic stenosis    5. Stool burden on imaging: Pooping with bowel regimen      Plan  Continue diet  Continue bowel regimen  Follow-up esophagram results.  If this  does not show any evidence of obstruction, then would not pursue more invasive work-up of his presenting symptoms and abnormal CT imaging    I discussed the patients findings and my recommendations with patient and nursing staff.    Colleen Maldonado MD

## 2020-01-22 NOTE — PROGRESS NOTES
Continued Stay Note  [facility]     Patient Name: Raymon Solis  MRN: [id number]  Today's Date: 1/22/2020    Admit Date: 1/18/2020    Discharge Plan     Row Name 01/22/20 1209       Plan    Plan  Plan skilled  care at accepting facility - pre cert needed.   NICOLE Quach RN    Patient/Family in Agreement with Plan  yes    Plan Comments  Spoke pt's daughter  (Layla Abrams [phone]) who wants to tours some facilities before making a decision.  Informed Layla that a decision needs to be made fairly quickly to start pre cert.   Plan skilled care at accepting facility- pre cert needed.   NICOLE Quach RN        Discharge Codes    No documentation.             Kelly Quach, RN

## 2020-01-22 NOTE — PLAN OF CARE
Patient alert and oriented, denies pain. Patient up in chair for lunch today. Will encourage patient to sit in chair for dinner. Patient on room air, A Flutter on monitor. No acute distress noted, will continue to monitor.

## 2020-01-22 NOTE — NURSING NOTE
IV in left arm accidentally got pulled per patient. No redness or swelling noted to site, catheter intact. Placed call to IV team for new placement. No acute distress noted, will continue to monitor.

## 2020-01-22 NOTE — NURSING NOTE
Transporter here to take patient to radiology. Patient able to ambulate from bed to stretcher with standby assist x 2. Patient tolerated well. No acute distress noted.

## 2020-01-23 LAB
ANION GAP SERPL CALCULATED.3IONS-SCNC: 9.2 MMOL/L (ref 5–15)
BUN BLD-MCNC: 13 MG/DL (ref 8–23)
BUN/CREAT SERPL: 14.1 (ref 7–25)
CALCIUM SPEC-SCNC: 8.9 MG/DL (ref 8.6–10.5)
CHLORIDE SERPL-SCNC: 106 MMOL/L (ref 98–107)
CO2 SERPL-SCNC: 24.8 MMOL/L (ref 22–29)
CREAT BLD-MCNC: 0.92 MG/DL (ref 0.76–1.27)
DEPRECATED RDW RBC AUTO: 43.3 FL (ref 37–54)
ERYTHROCYTE [DISTWIDTH] IN BLOOD BY AUTOMATED COUNT: 13.3 % (ref 12.3–15.4)
GFR SERPL CREATININE-BSD FRML MDRD: 79 ML/MIN/1.73
GFR SERPL CREATININE-BSD FRML MDRD: 96 ML/MIN/1.73
GLUCOSE BLD-MCNC: 89 MG/DL (ref 65–99)
GLUCOSE BLDC GLUCOMTR-MCNC: 102 MG/DL (ref 70–130)
GLUCOSE BLDC GLUCOMTR-MCNC: 126 MG/DL (ref 70–130)
GLUCOSE BLDC GLUCOMTR-MCNC: 150 MG/DL (ref 70–130)
GLUCOSE BLDC GLUCOMTR-MCNC: 162 MG/DL (ref 70–130)
HCT VFR BLD AUTO: 24.9 % (ref 37.5–51)
HGB BLD-MCNC: 8.1 G/DL (ref 13–17.7)
MCH RBC QN AUTO: 29.3 PG (ref 26.6–33)
MCHC RBC AUTO-ENTMCNC: 32.5 G/DL (ref 31.5–35.7)
MCV RBC AUTO: 90.2 FL (ref 79–97)
PLATELET # BLD AUTO: 318 10*3/MM3 (ref 140–450)
PMV BLD AUTO: 9.1 FL (ref 6–12)
POTASSIUM BLD-SCNC: 3.9 MMOL/L (ref 3.5–5.2)
RBC # BLD AUTO: 2.76 10*6/MM3 (ref 4.14–5.8)
SODIUM BLD-SCNC: 140 MMOL/L (ref 136–145)
WBC NRBC COR # BLD: 7.52 10*3/MM3 (ref 3.4–10.8)

## 2020-01-23 PROCEDURE — 99231 SBSQ HOSP IP/OBS SF/LOW 25: CPT | Performed by: INTERNAL MEDICINE

## 2020-01-23 PROCEDURE — 63710000001 INSULIN LISPRO (HUMAN) PER 5 UNITS: Performed by: NURSE PRACTITIONER

## 2020-01-23 PROCEDURE — 99231 SBSQ HOSP IP/OBS SF/LOW 25: CPT | Performed by: SURGERY

## 2020-01-23 PROCEDURE — 85027 COMPLETE CBC AUTOMATED: CPT | Performed by: NURSE PRACTITIONER

## 2020-01-23 PROCEDURE — 80048 BASIC METABOLIC PNL TOTAL CA: CPT | Performed by: HOSPITALIST

## 2020-01-23 PROCEDURE — 25010000002 ENOXAPARIN PER 10 MG: Performed by: NURSE PRACTITIONER

## 2020-01-23 PROCEDURE — 25010000002 PIPERACILLIN SOD-TAZOBACTAM PER 1 G: Performed by: HOSPITALIST

## 2020-01-23 PROCEDURE — 97110 THERAPEUTIC EXERCISES: CPT

## 2020-01-23 PROCEDURE — 82962 GLUCOSE BLOOD TEST: CPT

## 2020-01-23 RX ADMIN — BRIMONIDINE TARTRATE 1 DROP: 2 SOLUTION OPHTHALMIC at 16:18

## 2020-01-23 RX ADMIN — POLYETHYLENE GLYCOL 3350 17 G: 17 POWDER, FOR SOLUTION ORAL at 22:37

## 2020-01-23 RX ADMIN — LATANOPROST 1 DROP: 50 SOLUTION OPHTHALMIC at 22:38

## 2020-01-23 RX ADMIN — TAZOBACTAM SODIUM AND PIPERACILLIN SODIUM 3.38 G: 375; 3 INJECTION, SOLUTION INTRAVENOUS at 08:36

## 2020-01-23 RX ADMIN — DEXTROSE AND SODIUM CHLORIDE 75 ML/HR: 5; 450 INJECTION, SOLUTION INTRAVENOUS at 14:16

## 2020-01-23 RX ADMIN — DOCUSATE SODIUM 100 MG: 100 CAPSULE, LIQUID FILLED ORAL at 08:28

## 2020-01-23 RX ADMIN — TAMSULOSIN HYDROCHLORIDE 0.4 MG: 0.4 CAPSULE ORAL at 22:37

## 2020-01-23 RX ADMIN — SODIUM CHLORIDE, PRESERVATIVE FREE 10 ML: 5 INJECTION INTRAVENOUS at 22:38

## 2020-01-23 RX ADMIN — INSULIN LISPRO 2 UNITS: 100 INJECTION, SOLUTION INTRAVENOUS; SUBCUTANEOUS at 17:55

## 2020-01-23 RX ADMIN — BRIMONIDINE TARTRATE 1 DROP: 2 SOLUTION OPHTHALMIC at 08:28

## 2020-01-23 RX ADMIN — Medication: at 08:29

## 2020-01-23 RX ADMIN — BRIMONIDINE TARTRATE 1 DROP: 2 SOLUTION OPHTHALMIC at 22:38

## 2020-01-23 RX ADMIN — Medication: at 22:39

## 2020-01-23 RX ADMIN — TAZOBACTAM SODIUM AND PIPERACILLIN SODIUM 3.38 G: 375; 3 INJECTION, SOLUTION INTRAVENOUS at 03:30

## 2020-01-23 RX ADMIN — ENOXAPARIN SODIUM 40 MG: 40 INJECTION SUBCUTANEOUS at 14:08

## 2020-01-23 RX ADMIN — POLYETHYLENE GLYCOL 3350 17 G: 17 POWDER, FOR SOLUTION ORAL at 08:28

## 2020-01-23 RX ADMIN — INSULIN LISPRO 2 UNITS: 100 INJECTION, SOLUTION INTRAVENOUS; SUBCUTANEOUS at 22:37

## 2020-01-23 RX ADMIN — DEXTROSE AND SODIUM CHLORIDE 75 ML/HR: 5; 450 INJECTION, SOLUTION INTRAVENOUS at 01:36

## 2020-01-23 RX ADMIN — SODIUM CHLORIDE, PRESERVATIVE FREE 10 ML: 5 INJECTION INTRAVENOUS at 08:29

## 2020-01-23 RX ADMIN — DOCUSATE SODIUM 100 MG: 100 CAPSULE, LIQUID FILLED ORAL at 22:38

## 2020-01-23 RX ADMIN — ASPIRIN 81 MG: 81 TABLET, COATED ORAL at 08:28

## 2020-01-23 RX ADMIN — TAZOBACTAM SODIUM AND PIPERACILLIN SODIUM 3.38 G: 375; 3 INJECTION, SOLUTION INTRAVENOUS at 17:55

## 2020-01-23 NOTE — PROGRESS NOTES
[facility]  Inpatient Progress Note    Reason for Follow Up:  Gastric distention    Subjective     Interval History:   He is eating breakfast without issue.  Discussed his esophagram which was normal.    Current Facility-Administered Medications:   •  acetaminophen (TYLENOL) tablet 650 mg, 650 mg, Oral, Q4H PRN **OR** acetaminophen (TYLENOL) 160 MG/5ML solution 650 mg, 650 mg, Oral, Q4H PRN **OR** acetaminophen (TYLENOL) suppository 650 mg, 650 mg, Rectal, Q4H PRN, Dian Orellana APRN  •  aspirin EC tablet 81 mg, 81 mg, Oral, Daily, Lisa Umanzor APRN, 81 mg at 01/23/20 0828  •  bisacodyl (DULCOLAX) EC tablet 10 mg, 10 mg, Oral, Daily PRN, Lisa Umanzor APRN, 10 mg at 01/22/20 0809  •  brimonidine (ALPHAGAN) 0.2 % ophthalmic solution 1 drop, 1 drop, Both Eyes, TID, Lisa Umanzor APRN, 1 drop at 01/23/20 0828  •  dextrose (D50W) 25 g/ 50mL Intravenous Solution 25 g, 25 g, Intravenous, Q15 Min PRN, Lisa Umanzor APRN  •  dextrose (GLUTOSE) oral gel 15 g, 15 g, Oral, Q15 Min PRN, Lisa Umanzor APRN  •  dextrose 5 % and sodium chloride 0.45 % infusion, 75 mL/hr, Intravenous, Continuous, Lisa Umanzor APRN, Last Rate: 75 mL/hr at 01/23/20 0136, 75 mL/hr at 01/23/20 0136  •  docusate sodium (COLACE) capsule 100 mg, 100 mg, Oral, BID, Lisa Umanzor APRN, 100 mg at 01/23/20 0828  •  enoxaparin (LOVENOX) syringe 40 mg, 40 mg, Subcutaneous, Q24H, Lisa Umanzor APRN, 40 mg at 01/22/20 1525  •  glucagon (human recombinant) (GLUCAGEN DIAGNOSTIC) injection 1 mg, 1 mg, Subcutaneous, Q15 Min PRN, Lisa Umanzor APRN  •  Glycerin (Laxative) enema 7.5 mL, 7.5 mL, Rectal, Daily PRN, Colleen Maldonado MD, 7.5 mL at 01/20/20 1930  •  hydrALAZINE (APRESOLINE) injection 10 mg, 10 mg, Intravenous, Q6H PRN, Lisa Umanzor APRN  •  hydrocerin (EUCERIN) cream, , Topical, BID, Lisa Umanzor APRN  •  insulin lispro (humaLOG) injection 0-7 Units, 0-7 Units, Subcutaneous, 4x Daily With  Meals & Nightly, Lisa Umanzor APRN, 2 Units at 01/22/20 2150  •  latanoprost (XALATAN) 0.005 % ophthalmic solution 1 drop, 1 drop, Both Eyes, Nightly, Lisa Umanzor APRN, 1 drop at 01/22/20 2151  •  nitroglycerin (NITROSTAT) SL tablet 0.4 mg, 0.4 mg, Sublingual, Q5 Min PRN, Dian Orellana APRN  •  piperacillin-tazobactam (ZOSYN) 3.375 g in iso-osmotic dextrose 50 ml (premix), 3.375 g, Intravenous, Q8H, Jimmy Lee MD, 3.375 g at 01/23/20 0836  •  polyethylene glycol (MIRALAX) powder 17 g, 17 g, Oral, BID, Lisa Umanzor APRN, 17 g at 01/23/20 0828  •  promethazine (PHENERGAN) injection 12.5 mg, 12.5 mg, Intravenous, Q6H PRN, Lisa Umanzor APRN  •  [COMPLETED] Insert peripheral IV, , , Once **AND** sodium chloride 0.9 % flush 10 mL, 10 mL, Intravenous, PRN, Sabas Steel MD  •  sodium chloride 0.9 % flush 10 mL, 10 mL, Intravenous, Q12H, Dian Orellana APRN, 10 mL at 01/23/20 0829  •  sodium chloride 0.9 % flush 10 mL, 10 mL, Intravenous, PRN, Dian Orellana APRN  •  tamsulosin (FLOMAX) 24 hr capsule 0.4 mg, 0.4 mg, Oral, Nightly, Lisa Umanzor APRN, 0.4 mg at 01/22/20 2149  Review of Systems:    The following systems were reviewed and negative;  gastrointestinal    Objective     Vital Signs  Temp:  [97.2 °F (36.2 °C)-98.1 °F (36.7 °C)] 97.2 °F (36.2 °C)  Heart Rate:  [57-92] 57  Resp:  [16] 16  BP: (124-160)/(56-94) 160/72  Body mass index is 27.92 kg/m².    Intake/Output Summary (Last 24 hours) at 1/23/2020 0906  Last data filed at 1/23/2020 0905  Gross per 24 hour   Intake 2580 ml   Output 1300 ml   Net 1280 ml     I/O this shift:  In: 240 [P.O.:240]  Out: -      Physical Exam:   General: patient awake, alert and cooperative   Abdomen: soft, nontender, nondistended; normal bowel sounds   Rectal: deferred   Extremities: no rash or edema   Psychiatric: Normal mood and behavior; memory intact     Results Review:     I reviewed the patient's new clinical results.  I reviewed  the patient's new imaging results and agree with the interpretation.    Results from last 7 days   Lab Units 01/23/20  0529 01/22/20  0551 01/21/20  0542   WBC 10*3/mm3 7.52 7.34 7.83   HEMOGLOBIN g/dL 8.1* 9.0* 9.2*   HEMATOCRIT % 24.9* 26.7* 27.5*   PLATELETS 10*3/mm3 318 319 313     Results from last 7 days   Lab Units 01/23/20  0529 01/22/20  0551 01/20/20  1023  01/18/20  2350   SODIUM mmol/L 140 137 139   < > 148*   POTASSIUM mmol/L 3.9 3.6 4.2   < > 4.5   CHLORIDE mmol/L 106 100 102   < > 107   CO2 mmol/L 24.8 25.1 28.3   < > 29.4*   BUN mg/dL 13 12 17   < > 25*   CREATININE mg/dL 0.92 0.81 0.83   < > 1.00   CALCIUM mg/dL 8.9 9.0 8.8   < > 9.6   BILIRUBIN mg/dL  --   --  0.3  --  <0.2*   ALK PHOS U/L  --   --  126*  --  167*   ALT (SGPT) U/L  --   --  9  --  16   AST (SGOT) U/L  --   --  17  --  25   GLUCOSE mg/dL 89 96 102*   < > 97    < > = values in this interval not displayed.         Lab Results   Lab Value Date/Time    LIPASE 40 01/18/2020 2350    LIPASE 21 05/13/2019 1657       Assessment/Plan   Assessment:   1.  Gastric distention - resolved on f/u imaging  2.  Dilated esophagus - normal esophagram  3.  Constipation - improving with bowel regimen    All problems new to me    Plan:   Continue current bowel regimen  No further workup necessary at this time for his transient gastric distention    GI will sign off      I discussed the patients findings and my recommendations with patient.         Omi Blackmon M.D.  [facility]  [address] [place] [address]  Office: [phone]

## 2020-01-23 NOTE — PLAN OF CARE
Problem: Pain, Acute (Adult)  Goal: Acceptable Pain Control/Comfort Level  Outcome: Ongoing (interventions implemented as appropriate)     Problem: Skin Injury Risk (Adult)  Goal: Skin Health and Integrity  Outcome: Ongoing (interventions implemented as appropriate)  Flowsheets (Taken 1/23/2020 1843)  Skin Health and Integrity: making progress toward outcome     Problem: Fall Risk (Adult)  Goal: Absence of Fall  Outcome: Ongoing (interventions implemented as appropriate)  Flowsheets (Taken 1/23/2020 1843)  Absence of Fall: making progress toward outcome     Problem: Urinary Elimination/Continence Impairment (IRF) (Adult)  Goal: Optimal Management of Bladder Program, Effective Elimination  Outcome: Ongoing (interventions implemented as appropriate)  Goal: Eliminate/Minimize Urinary Incontinence Episodes  Outcome: Ongoing (interventions implemented as appropriate)     Problem: Patient Care Overview  Goal: Plan of Care Review  Outcome: Ongoing (interventions implemented as appropriate)  Flowsheets (Taken 1/23/2020 1843)  Progress: improving  Plan of Care Reviewed With: patient  Outcome Summary: Patient alert. Denies pain. Patient has been incontinent of urine. Vital signs are stable. On iv antibiotic. No s/s of acute distress. Tolerating diet well. Will continue to monitor.  Goal: Individualization and Mutuality  Outcome: Ongoing (interventions implemented as appropriate)  Goal: Discharge Needs Assessment  Outcome: Ongoing (interventions implemented as appropriate)  Goal: Interprofessional Rounds/Family Conf  Outcome: Ongoing (interventions implemented as appropriate)

## 2020-01-23 NOTE — PROGRESS NOTES
Continued Stay Note  [facility]     Patient Name: Raymon Solis  MRN: [id number]  Today's Date: 1/23/2020    Admit Date: 1/18/2020    Discharge Plan     Row Name 01/23/20 1449       Plan    Plan  Plan home with [facility].   NICOLE Quach RN    Patient/Family in Agreement with Plan  yes    Plan Comments  Spoke with pt's daughter and informed her in network skilled facilities are [facility], [facility], [facility] and [facility].   Pt's daughter does not want skilled care at any of these facilities.  Pt 's daughter requesting HH referral .   First choice for HH is [facility].  Natalia  ( [phone]) called and [facility] is not in network with pt's insurance.   [facility] is in network.   Joan  ( [phone]) called to follow.  Plan home with [facility].   NICOLE Quach RN    Row Name 01/23/20 2025       Plan    Plan  Skilled care at accepting facility per-cert needed     Plan Comments  Call received from Tomer/Priya [phone] patient can return pending bed availability. Call placed to daughter Layla Abrams [phone]. Joanna FLETCHER        Discharge Codes    No documentation.             Kelly Quach, RN

## 2020-01-23 NOTE — PROGRESS NOTES
Discharge Planning Assessment  [facility]     Patient Name: Raymon Solis  MRN: [id number]  Today's Date: 1/23/2020    Admit Date: 1/18/2020    Discharge Needs Assessment    No documentation.       Discharge Plan     Row Name 01/23/20 1428       Plan    Plan  Skilled care at accepting facility per-cert needed     Plan Comments  Call recieved from Julia [phone] patient can return pending bed availability. Call placed to pita Abrams [phone]. Joanna FLETCHER        Destination      Coordination has not been started for this encounter.      Durable Medical Equipment      Coordination has not been started for this encounter.      Dialysis/Infusion      Coordination has not been started for this encounter.      Home Medical Care      Coordination has not been started for this encounter.      Therapy      Coordination has not been started for this encounter.      Community Resources      Coordination has not been started for this encounter.          Demographic Summary    No documentation.       Functional Status    No documentation.       Psychosocial    No documentation.       Abuse/Neglect    No documentation.       Legal    No documentation.       Substance Abuse    No documentation.       Patient Forms    No documentation.           Salome Arredondo, RN

## 2020-01-23 NOTE — THERAPY TREATMENT NOTE
Patient Name: Raymon Solis  : 1940    MRN: [id number]                              Today's Date: 2020       Admit Date: 2020    Visit Dx:     ICD-10-CM ICD-9-CM   1. Acute abdominal pain R10.9 789.00     338.19   2. Lactic acidosis E87.2 276.2   3. Intractable vomiting with nausea, unspecified vomiting type R11.2 536.2   4. History of UTI Z87.440 V13.02   5. History of pneumonia Z87.01 V12.61   6. Recurrent Hypoglycemia E16.2 251.2   7. Gastric distention K31.89 536.8     Patient Active Problem List   Diagnosis   • Acute pyelonephritis   • HTN (hypertension)   • Type 2 diabetes mellitus, without long-term current use of insulin (CMS/HCC)   • Elevated alkaline phosphatase level   • Aortic stenosis   • Acute UTI   • Wandering atrial pacemaker   • Elevated troponin   • Sepsis without acute organ dysfunction (CMS/HCC)   • History of noncompliance with medical treatment   • Bacteremia due to Escherichia coli   • Left lower lobe pulmonary infiltrate   • Non-traumatic rhabdomyolysis   • Lesion of left native kidney   • Constipation   • Acute abdominal pain   • Atrial flutter (CMS/HCC)   • Hypernatremia   • Pleural effusion   • Hypoglycemia   • Gastric distention   • Aspiration pneumonia (CMS/HCC)     Past Medical History:   Diagnosis Date   • Aortic stenosis    • Bacteremia due to Escherichia coli    • Diabetes mellitus (CMS/HCC)    • Hypertension    • UTI (urinary tract infection)      Past Surgical History:   Procedure Laterality Date   • CATARACT EXTRACTION, BILATERAL       General Information     Row Name 20 1207          PT Evaluation Time/Intention    Document Type  therapy note (daily note)  -     Mode of Treatment  physical therapy;individual therapy  -     Row Name 20 1200          Cognitive Assessment/Intervention- PT/OT    Orientation Status (Cognition)  oriented to;person;place  -     Row Name 20 1209          Safety Issues, Functional Mobility    Safety Issues  Affecting Function (Mobility)  insight into deficits/self awareness  -     Impairments Affecting Function (Mobility)  endurance/activity tolerance;strength;balance  -       User Key  (r) = Recorded By, (t) = Taken By, (c) = Cosigned By    Initials Name Provider Type     An Rosa PTA Physical Therapy Assistant        Mobility     Row Name 01/23/20 1208          Bed Mobility Assessment/Treatment    Supine-Sit [place] (Bed Mobility)  minimum assist (75% patient effort);verbal cues  -     Assistive Device (Bed Mobility)  bed rails;head of bed elevated  -     Row Name 01/23/20 1208          Sit-Stand Transfer    Sit-Stand [place] (Transfers)  contact guard;verbal cues  -     Assistive Device (Sit-Stand Transfers)  walker, front-wheeled  -     Row Name 01/23/20 1208          Gait/Stairs Assessment/Training    [place] Level (Gait)  contact guard  -     Assistive Device (Gait)  walker, front-wheeled  -     Distance in Feet (Gait)  pt took few steps from bed to chair.  pt declines to ambulate further.  -       User Key  (r) = Recorded By, (t) = Taken By, (c) = Cosigned By    Initials Name Provider Type     An Rosa PTA Physical Therapy Assistant        Obj/Interventions     Row Name 01/23/20 1209          Therapeutic Exercise    Lower Extremity (Therapeutic Exercise)  heel slides, bilateral;LAQ (long arc quad), bilateral;marching while seated  -     Lower Extremity Range of Motion (Therapeutic Exercise)  hip abduction/adduction, bilateral  -     Exercise Type (Therapeutic Exercise)  AROM (active range of motion)  -     Position (Therapeutic Exercise)  seated reclined  -     Sets/Reps (Therapeutic Exercise)  X5-10 reps  -     Row Name 01/23/20 1209          Static Sitting Balance    Level of [place] (Unsupported Sitting, Static Balance)  contact guard assist  -     Sitting Position (Unsupported Sitting, Static Balance)  sitting on edge of bed  -     Time Able  to Maintain Position (Unsupported Sitting, Static Balance)  1 to 2 minutes  -       User Key  (r) = Recorded By, (t) = Taken By, (c) = Cosigned By    Initials Name Provider Type     An Rosa PTA Physical Therapy Assistant        Goals/Plan    No documentation.       Clinical Impression     Row Name 01/23/20 1209          Pain Scale: Numbers Pre/Post-Treatment    Pain Scale: Numbers, Pretreatment  0/10 - no pain  -     Row Name 01/23/20 1209          Plan of Care Review    Plan of Care Reviewed With  patient  -     Progress  improving  -     Outcome Summary  Pt tolerated treatment with no complaints. Pt still requires a lot of encouragement to participate with PT. Pt is Ronni supine to sit. Pt took a few steps from the bed to chair with CGA. Pt declines to ambulate further. Pt performed a few bilateral LE exercises while seated in chair. Will continue to progress pt as able.   -     Row Name 01/23/20 1209          Positioning and Restraints    Pre-Treatment Position  in bed  -     Post Treatment Position  chair  -     In Chair  reclined;call light within reach;encouraged to call for assist;exit alarm on  -       User Key  (r) = Recorded By, (t) = Taken By, (c) = Cosigned By    Initials Name Provider Type     An Rosa PTA Physical Therapy Assistant        Outcome Measures     Row Name 01/23/20 1214          How much help from another person do you currently need...    Turning from your back to your side while in flat bed without using bedrails?  3  -EH     Moving from lying on back to sitting on the side of a flat bed without bedrails?  3  -EH     Moving to and from a bed to a chair (including a wheelchair)?  3  -EH     Standing up from a chair using your arms (e.g., wheelchair, bedside chair)?  3  -EH     Climbing 3-5 steps with a railing?  1  -EH     To walk in hospital room?  2  -EH     AM-PAC 6 Clicks Score (PT)  15  -       User Key  (r) = Recorded By, (t) = Taken By, (c) =  Cosigned By    Initials Name Provider Type     An Rosa PTA Physical Therapy Assistant          PT Recommendation and Plan     Plan of Care Reviewed With: patient  Progress: improving  Outcome Summary: Pt tolerated treatment with no complaints. Pt still requires a lot of encouragement to participate with PT. Pt is Ronni supine to sit. Pt took a few steps from the bed to chair with CGA. Pt declines to ambulate further. Pt performed a few bilateral LE exercises while seated in chair. Will continue to progress pt as able.      Time Calculation:   PT Charges     Row Name 01/23/20 1206             Time Calculation    Start Time  1108  -      Stop Time  1121  -      Time Calculation (min)  13 min  -      PT Received On  01/23/20  -      PT - Next Appointment  01/24/20  -         Time Calculation- PT    Total Timed Code Minutes- PT  13 minute(s)  -        User Key  (r) = Recorded By, (t) = Taken By, (c) = Cosigned By    Initials Name Provider Type     An Rosa PTA Physical Therapy Assistant        Therapy Charges for Today     Code Description Service Date Service Provider Modifiers Qty    08298799137 HC PT THER PROC EA 15 MIN 1/23/2020 An Rosa PTA GP 1          PT G-Codes  Outcome Measure Options: AM-PAC 6 Clicks Basic Mobility (PT)  AM-PAC 6 Clicks Score (PT): 15    An Rosa PTA  1/23/2020

## 2020-01-23 NOTE — PLAN OF CARE
Problem: Patient Care Overview  Goal: Plan of Care Review  Outcome: Ongoing (interventions implemented as appropriate)  Flowsheets (Taken 1/23/2020 1202)  Progress: improving  Plan of Care Reviewed With: patient  Outcome Summary: Pt tolerated treatment with no complaints. Pt still requires a lot of encouragement to participate with PT. Pt is Ronni supine to sit. Pt took a few steps from the bed to chair with CGA. Pt declines to ambulate further. Pt performed a few bilateral LE exercises while seated in chair. Will continue to progress pt as able.

## 2020-01-23 NOTE — PLAN OF CARE
Problem: Pain, Acute (Adult)  Goal: Acceptable Pain Control/Comfort Level  Outcome: Ongoing (interventions implemented as appropriate)     Problem: Skin Injury Risk (Adult)  Goal: Skin Health and Integrity  Outcome: Ongoing (interventions implemented as appropriate)     Problem: Fall Risk (Adult)  Goal: Absence of Fall  Outcome: Ongoing (interventions implemented as appropriate)     Problem: Urinary Elimination/Continence Impairment (IRF) (Adult)  Goal: Optimal Management of Bladder Program, Effective Elimination  Outcome: Ongoing (interventions implemented as appropriate)     Problem: Patient Care Overview  Goal: Plan of Care Review  Outcome: Ongoing (interventions implemented as appropriate)     Problem: Patient Care Overview  Goal: Individualization and Mutuality  Outcome: Ongoing (interventions implemented as appropriate)   Pt rested well thru the night, turned and repositioned, requires pillows  to maintain position.  No s/sx of pain or discomfort. Ivf and iv a/b infusing with no problems observed. Voiding adequately per urinal. NAD, VSS,  Aflutter on monitor.

## 2020-01-23 NOTE — DISCHARGE PLACEMENT REQUEST
"Salbador Solis (79 y.o. Male)     Date of Birth Social Security Number Address Home Phone MRN    1940  [address] [address]  [id number]    [employer] Marital Status          Latter day        Admission Date Admission Type Admitting Provider Attending Provider Department, Room/Bed    1/18/20 Emergency Javy Mccracken MD Lykins, Matthew D, MD [address], [address]1    Discharge Date Discharge Disposition Discharge Destination                       Attending Provider:  Bradley Pearce MD    Allergies:  No Known Allergies    Isolation:  None   Infection:  None   Code Status:  CPR    Ht:  165 cm (64.96\")   Wt:  76 kg (167 lb 8.8 oz)    Admission Cmt:  None   Principal Problem:  None                Active Insurance as of 1/18/2020     Primary Coverage     Payor Plan Insurance Group Employer/Plan Group    HUMANA MEDICARE REPLACEMENT HUMANA [facility] HMO [facility] MEDICARE REPLACEMENT NON PAR [id number]     Payor Plan Address Payor Plan Phone Number Payor Plan Fax Number Effective Dates       1/1/2018 - None Entered    Subscriber Name Subscriber Birth Date Member ID       SALBADOR SOLIS 1940 [id number]                 Emergency Contacts      (Rel.) Home Phone Work Phone Mobile Phone    MARTIN GOLDEN (Daughter) -- -- [phone]              "

## 2020-01-23 NOTE — PROGRESS NOTES
[facility] INPATIENT PROGRESS NOTE         [address]    2020      PATIENT IDENTIFICATION:  Name: Raymon Solis ADMIT: 2020   : 1940  PCP: Magali Burgess MD    MRN: [id number] LOS: 4 days   AGE/SEX: 79 y.o. male  ROOM: [facility]                     LOS 4    Reason for visit: PNA      SUBJECTIVE:      Opens eyes to voice.  No coughing throughout the time of my visit.  Diminished breath sounds on auscultation.    Objective   OBJECTIVE:    Vital Sign Min/Max for last 24 hours  Temp  Min: 97.2 °F (36.2 °C)  Max: 98.1 °F (36.7 °C)   BP  Min: 124/60  Max: 160/72   Pulse  Min: 57  Max: 92   Resp  Min: 16  Max: 16   SpO2  Min: 97 %  Max: 98 %   No data recorded   No data recorded                         Body mass index is 27.92 kg/m².    Intake/Output Summary (Last 24 hours) at 2020 0908  Last data filed at 2020 0905  Gross per 24 hour   Intake 2460 ml   Output 1300 ml   Net 1160 ml         Exam:  GEN:  No distress, appears stated age  EYES:   PERRL, anicteric sclera  ENT:    External ears/nose normal, OP clear  NECK:  No adenopathy, midline trachea  LUNGS: Normal chest on inspection, palpation and diminished BS on auscultation  CV:  Normal S1S2, without murmur  ABD:  Non tender, non distended, no hepatosplenomegaly, +BS  EXT:  No edema, cyanosis or clubbing    Scheduled meds:    aspirin 81 mg Oral Daily   brimonidine 1 drop Both Eyes TID   docusate sodium 100 mg Oral BID   enoxaparin 40 mg Subcutaneous Q24H   hydrocerin  Topical BID   insulin lispro 0-7 Units Subcutaneous 4x Daily With Meals & Nightly   latanoprost 1 drop Both Eyes Nightly   piperacillin-tazobactam 3.375 g Intravenous Q8H   polyethylene glycol 17 g Oral BID   sodium chloride 10 mL Intravenous Q12H   tamsulosin 0.4 mg Oral Nightly     IV meds:                        dextrose 5 % and sodium chloride 0.45 % 75 mL/hr Last Rate: 75 mL/hr (20 0136)     Data Review:  Results from last 7 days   Lab Units  01/23/20  0529 01/22/20  0551 01/20/20  1023 01/19/20  0750 01/18/20  2350   SODIUM mmol/L 140 137 139 146* 148*   POTASSIUM mmol/L 3.9 3.6 4.2 4.4 4.5   CHLORIDE mmol/L 106 100 102 106 107   CO2 mmol/L 24.8 25.1 28.3 29.3* 29.4*   BUN mg/dL 13 12 17 23 25*   CREATININE mg/dL 0.92 0.81 0.83 0.88 1.00   GLUCOSE mg/dL 89 96 102* 84 97   CALCIUM mg/dL 8.9 9.0 8.8 9.6 9.6         Estimated Creatinine Clearance: 61.9 mL/min (by C-G formula based on SCr of 0.92 mg/dL).  Results from last 7 days   Lab Units 01/23/20  0529 01/22/20  0551 01/21/20  0542 01/20/20  1158 01/19/20  0750   WBC 10*3/mm3 7.52 7.34 7.83 9.49 19.16*   HEMOGLOBIN g/dL 8.1* 9.0* 9.2* 8.7* 10.0*   PLATELETS 10*3/mm3 318 319 313 324 364         Results from last 7 days   Lab Units 01/20/20  1023 01/18/20  2350   ALT (SGPT) U/L 9 16   AST (SGOT) U/L 17 25         Results from last 7 days   Lab Units 01/22/20  0551 01/19/20  0409 01/18/20  2353 01/18/20  2350   PROCALCITONIN ng/mL 0.11  --   --  0.14   LACTATE mmol/L  --  1.7 2.9*  --              )Assessment   ASSESSMENT:      Active Hospital Problems    Diagnosis  POA   • Aspiration pneumonia (CMS/HCC) [J69.0]  Unknown   • Acute abdominal pain [R10.9]  Yes   • Atrial flutter (CMS/HCC) [I48.92]  Unknown   • Hypernatremia [E87.0]  Unknown   • Pleural effusion [J90]  Unknown   • Hypoglycemia [E16.2]  Unknown   • Gastric distention [K31.89]  Unknown   • Constipation [K59.00]  Yes   • Sepsis without acute organ dysfunction (CMS/HCC) [A41.9]  Unknown   • Elevated troponin [R79.89]  Yes   • Aortic stenosis [I35.0]  Yes   • HTN (hypertension) [I10]  Yes   • Type 2 diabetes mellitus, without long-term current use of insulin (CMS/[facility]) [E11.9]  Yes      Resolved Hospital Problems   No resolved problems to display.     Aspiration pneumonia/pneumonitis  Abdominal pain/gastritis  Sepsis  Atrial flutter  Aortic stenosis  Elevated troponin  Type 2 diabetes      PLAN:  This may be more pneumonitis from aspiration than  pneumonia.  Afebrile and does not have significantly elevated white count.  Procalcitonin is also not significantly elevated.  Can continue Zosyn for potential bacterial component.  Possibly short course.  Encourage pulmonary toilet.  Add Acapella valve to help with clearance.  Constipation appears to be improving.  Control glucose.    Tyson Perdue MD  Pulmonary and Critical Care Medicine  [facility], [facility]  1/23/2020    9:08 AM

## 2020-01-23 NOTE — PROGRESS NOTES
CC: Gastric distention    S: No events overnight.  Tolerating diet without any problem.  No dysphagia, no abdominal pain    O:   Vitals:    01/22/20 1320 01/22/20 2008 01/22/20 2349 01/23/20 0723   BP: 136/94 128/56 124/60 160/72   BP Location: Right arm Right arm Right arm Right arm   Patient Position: Sitting Lying Lying Lying   Pulse: 92 73 58 57   Resp: 16 16 16 16   Temp: 97.7 °F (36.5 °C) 98.1 °F (36.7 °C) 98 °F (36.7 °C) 97.2 °F (36.2 °C)   TempSrc: Oral Oral Oral Oral   SpO2: 97% 98% 98%    Weight:       Height:         Alert and oriented x3, no acute distress  Abdomen soft, nontender nondistended    Labs stable    Esophagram from yesterday was reviewed by me.  There is normal esophagus with normal emptying into the stomach.  There is no gastric distention    Assessment and plan:    79-year-old male admitted with recurrent pneumonia, was found to have gastric distention likely from systemic response.  He has not shown any signs of GI problems while in the hospital.  He has been tolerating diet without any problem    -Continue regular diet as tolerated  -No acute surgical problems  -We will sign off, call with questions

## 2020-01-24 VITALS
RESPIRATION RATE: 16 BRPM | WEIGHT: 167.55 LBS | OXYGEN SATURATION: 98 % | TEMPERATURE: 97.8 F | BODY MASS INDEX: 27.92 KG/M2 | HEIGHT: 65 IN | HEART RATE: 56 BPM | DIASTOLIC BLOOD PRESSURE: 74 MMHG | SYSTOLIC BLOOD PRESSURE: 133 MMHG

## 2020-01-24 PROBLEM — A41.9 SEPSIS WITHOUT ACUTE ORGAN DYSFUNCTION (HCC): Status: RESOLVED | Noted: 2020-01-08 | Resolved: 2020-01-24

## 2020-01-24 PROBLEM — J69.0 ASPIRATION PNEUMONIA (HCC): Status: RESOLVED | Noted: 2020-01-20 | Resolved: 2020-01-24

## 2020-01-24 PROBLEM — E16.2 HYPOGLYCEMIA: Status: RESOLVED | Noted: 2020-01-19 | Resolved: 2020-01-24

## 2020-01-24 PROBLEM — E87.0 HYPERNATREMIA: Status: RESOLVED | Noted: 2020-01-19 | Resolved: 2020-01-24

## 2020-01-24 PROBLEM — K31.89 GASTRIC DISTENTION: Status: RESOLVED | Noted: 2020-01-19 | Resolved: 2020-01-24

## 2020-01-24 LAB
ANION GAP SERPL CALCULATED.3IONS-SCNC: 9.4 MMOL/L (ref 5–15)
BACTERIA SPEC AEROBE CULT: NORMAL
BACTERIA SPEC AEROBE CULT: NORMAL
BUN BLD-MCNC: 14 MG/DL (ref 8–23)
BUN/CREAT SERPL: 13.9 (ref 7–25)
CALCIUM SPEC-SCNC: 8.9 MG/DL (ref 8.6–10.5)
CHLORIDE SERPL-SCNC: 108 MMOL/L (ref 98–107)
CO2 SERPL-SCNC: 25.6 MMOL/L (ref 22–29)
CREAT BLD-MCNC: 1.01 MG/DL (ref 0.76–1.27)
DEPRECATED RDW RBC AUTO: 43.2 FL (ref 37–54)
ERYTHROCYTE [DISTWIDTH] IN BLOOD BY AUTOMATED COUNT: 13.6 % (ref 12.3–15.4)
GFR SERPL CREATININE-BSD FRML MDRD: 71 ML/MIN/1.73
GFR SERPL CREATININE-BSD FRML MDRD: 86 ML/MIN/1.73
GLUCOSE BLD-MCNC: 78 MG/DL (ref 65–99)
GLUCOSE BLDC GLUCOMTR-MCNC: 129 MG/DL (ref 70–130)
GLUCOSE BLDC GLUCOMTR-MCNC: 130 MG/DL (ref 70–130)
GLUCOSE BLDC GLUCOMTR-MCNC: 171 MG/DL (ref 70–130)
GLUCOSE BLDC GLUCOMTR-MCNC: 88 MG/DL (ref 70–130)
HCT VFR BLD AUTO: 26.4 % (ref 37.5–51)
HGB BLD-MCNC: 8.7 G/DL (ref 13–17.7)
MCH RBC QN AUTO: 29.3 PG (ref 26.6–33)
MCHC RBC AUTO-ENTMCNC: 33 G/DL (ref 31.5–35.7)
MCV RBC AUTO: 88.9 FL (ref 79–97)
PLATELET # BLD AUTO: 266 10*3/MM3 (ref 140–450)
PMV BLD AUTO: 8.9 FL (ref 6–12)
POTASSIUM BLD-SCNC: 4 MMOL/L (ref 3.5–5.2)
RBC # BLD AUTO: 2.97 10*6/MM3 (ref 4.14–5.8)
SODIUM BLD-SCNC: 143 MMOL/L (ref 136–145)
WBC NRBC COR # BLD: 7.15 10*3/MM3 (ref 3.4–10.8)

## 2020-01-24 PROCEDURE — 25010000002 PIPERACILLIN SOD-TAZOBACTAM PER 1 G: Performed by: HOSPITALIST

## 2020-01-24 PROCEDURE — 80048 BASIC METABOLIC PNL TOTAL CA: CPT | Performed by: HOSPITALIST

## 2020-01-24 PROCEDURE — 85027 COMPLETE CBC AUTOMATED: CPT | Performed by: NURSE PRACTITIONER

## 2020-01-24 PROCEDURE — 25010000002 ENOXAPARIN PER 10 MG: Performed by: NURSE PRACTITIONER

## 2020-01-24 PROCEDURE — 63710000001 INSULIN LISPRO (HUMAN) PER 5 UNITS: Performed by: NURSE PRACTITIONER

## 2020-01-24 PROCEDURE — 82962 GLUCOSE BLOOD TEST: CPT

## 2020-01-24 RX ORDER — SENNA AND DOCUSATE SODIUM 50; 8.6 MG/1; MG/1
1 TABLET, FILM COATED ORAL DAILY
Qty: 30 TABLET | Refills: 0 | Status: SHIPPED | OUTPATIENT
Start: 2020-01-24

## 2020-01-24 RX ADMIN — Medication: at 08:25

## 2020-01-24 RX ADMIN — ASPIRIN 81 MG: 81 TABLET, COATED ORAL at 08:24

## 2020-01-24 RX ADMIN — LATANOPROST 1 DROP: 50 SOLUTION OPHTHALMIC at 20:44

## 2020-01-24 RX ADMIN — INSULIN LISPRO 2 UNITS: 100 INJECTION, SOLUTION INTRAVENOUS; SUBCUTANEOUS at 17:30

## 2020-01-24 RX ADMIN — POLYETHYLENE GLYCOL 3350 17 G: 17 POWDER, FOR SOLUTION ORAL at 08:23

## 2020-01-24 RX ADMIN — BRIMONIDINE TARTRATE 1 DROP: 2 SOLUTION OPHTHALMIC at 08:24

## 2020-01-24 RX ADMIN — DOCUSATE SODIUM 100 MG: 100 CAPSULE, LIQUID FILLED ORAL at 20:42

## 2020-01-24 RX ADMIN — SODIUM CHLORIDE, PRESERVATIVE FREE 10 ML: 5 INJECTION INTRAVENOUS at 08:25

## 2020-01-24 RX ADMIN — ENOXAPARIN SODIUM 40 MG: 40 INJECTION SUBCUTANEOUS at 14:32

## 2020-01-24 RX ADMIN — POLYETHYLENE GLYCOL 3350 17 G: 17 POWDER, FOR SOLUTION ORAL at 20:42

## 2020-01-24 RX ADMIN — BRIMONIDINE TARTRATE 1 DROP: 2 SOLUTION OPHTHALMIC at 20:43

## 2020-01-24 RX ADMIN — TAZOBACTAM SODIUM AND PIPERACILLIN SODIUM 3.38 G: 375; 3 INJECTION, SOLUTION INTRAVENOUS at 10:21

## 2020-01-24 RX ADMIN — DOCUSATE SODIUM 100 MG: 100 CAPSULE, LIQUID FILLED ORAL at 08:23

## 2020-01-24 RX ADMIN — TAZOBACTAM SODIUM AND PIPERACILLIN SODIUM 3.38 G: 375; 3 INJECTION, SOLUTION INTRAVENOUS at 01:54

## 2020-01-24 RX ADMIN — TAMSULOSIN HYDROCHLORIDE 0.4 MG: 0.4 CAPSULE ORAL at 20:42

## 2020-01-24 RX ADMIN — BRIMONIDINE TARTRATE 1 DROP: 2 SOLUTION OPHTHALMIC at 16:54

## 2020-01-24 NOTE — PROGRESS NOTES
Continued Stay Note  [facility]     Patient Name: Raymon Solis  MRN: [id number]  Today's Date: 1/24/2020    Admit Date: 1/18/2020    Discharge Plan     Row Name 01/24/20 1331       Plan    Plan  Plan home with [facility].  NICOLE Quach RN    Patient/Family in Agreement with Plan  yes    Plan Comments  Attempted to call pt's daughter  (Layla Abrams [phone]) without success.   Pt with discharge order.  Joan  ( [phone]) with [facility] called with pt's discharge.  Plan home with [facility].   NICOLE Quach RN        Discharge Codes    No documentation.       Expected Discharge Date and Time     Expected Discharge Date Expected Discharge Time    Jan 24, 2020             Kelly Quach RN

## 2020-01-24 NOTE — DISCHARGE SUMMARY
Date of Admission: 1/18/2020  Date of Discharge:  1/24/2020  Primary Care Physician: Magali Burgess MD     Discharge Diagnosis:  Active Hospital Problems    Diagnosis  POA   • Acute abdominal pain [R10.9]  Yes   • Atrial flutter (CMS/HCC) [I48.92]  Yes   • Pleural effusion [J90]  Yes   • Constipation [K59.00]  Yes   • Elevated troponin [R79.89]  Yes   • Aortic stenosis [I35.0]  Yes   • HTN (hypertension) [I10]  Yes   • Type 2 diabetes mellitus, without long-term current use of insulin (CMS/HCC) [E11.9]  Yes      Resolved Hospital Problems    Diagnosis Date Resolved POA   • Aspiration pneumonia (CMS/HCC) [J69.0] 01/24/2020 Yes   • Hypernatremia [E87.0] 01/24/2020 Yes   • Hypoglycemia [E16.2] 01/24/2020 Yes   • Gastric distention [K31.89] 01/24/2020 Yes   • Sepsis without acute organ dysfunction (CMS/HCC) [A41.9] 01/24/2020 Yes       Presenting Problem/History of Present Illness:  Lactic acidosis [E87.2]  Acute abdominal pain [R10.9]  Hypoglycemia [E16.2]  History of pneumonia [Z87.01]  Gastric distention [K31.89]  History of UTI [Z87.440]  Intractable vomiting with nausea, unspecified vomiting type [R11.2]  Acute abdominal pain [R10.9]     Hospital Course:  The patient is a 79 y.o. male with a history of HTN, Type 2 DM, and aortic stenosis recently hospitalized for E. Coli bacteremia from UTI as well as pneumonia who presented from nursing home with abdominal pain.  Please see admission H&P from 1/19/20 for further details.  He was found to have acute gastric distension with severe constipation.  General surgery evaluated the patient and he had an NG tube placed.  His issues resolved with a bowel regimen and he was able to have the NG tube removed and his diet advanced without difficulty.  He did have some hypoglycemia on admission related to the above issues and his glipizide will be discontinued for now.  He will resume metformin at discharge.    He did have some evidence of aspiration pneumonia vs pneumonitis. He  "did complete a 6d course of zosyn.  Pulmonology followed the patient.  He should continue pulmonary toilet measures at discharge.  He did have atrial flutter during the admission.  Cardiology evaluated the patient and recommended no negative chronotropic agents given his relatively low heart rate.  They did recommend eliquis at discharge given his CHADS-VASC score of 5 and this will be started.    Exam Today:  Blood pressure 128/62, pulse 57, temperature 98.2 °F (36.8 °C), temperature source Oral, resp. rate 16, height 165 cm (64.96\"), weight 76 kg (167 lb 8.8 oz), SpO2 98 %.  Constitutional: He is oriented to person, place, and time. No distress.   Head: Normocephalic and atraumatic.   Mouth/Throat: Oropharynx is clear and moist.   Eyes: Pupils are equal, round, and reactive to light. Conjunctivae and EOM are normal.   Neck: Normal range of motion. Neck supple.   Cardiovascular: Normal rate, regular rhythm and intact distal pulses.   Pulmonary/Chest: Effort normal. He has decreased bibasilar breath sounds.  Abdominal: Soft. Bowel sounds are normal.   Musculoskeletal: He exhibits no edema or tenderness.   Neurological: He is alert and oriented to person, place, and time.   Skin: Skin is warm and dry. Capillary refill takes less than 2 seconds. He is not diaphoretic.   Psychiatric: He has a normal mood and affect. His behavior is normal.   Nursing note and vitals reviewed.    Procedures Performed:  Raymon Solis   Echocardiogram   Order# [id number]   Reading physician: Tim Salas MD Ordering physician: Stacy Nettles MD Study date: 20   Patient Information     Patient Name  Raymon Solis MRN  [id number] Sex  Male  (Age)  1940 (79 y.o.)   Admission Information     Admission Date/Time Discharge Date/Time Room/Bed   20  2332  E652/1   Sedation Narrator Report     Sedation Narrator Report   Interpretation Summary     · Left ventricular systolic function is normal. Calculated EF = 64.0%. " Estimated EF was in agreement with the calculated EF. Normal left ventricular cavity size noted. All left ventricular wall segments contract normally. Left ventricular wall thickness is consistent with mild-to-moderate concentric hypertrophy. Left ventricular diastolic function was indeterminate.  · Left atrial cavity size is mildly dilated.  · Moderate aortic valve stenosis is present.     Left Ventricle Left ventricular systolic function is normal. Calculated EF = 64.0%. Estimated EF was in agreement with the calculated EF. Normal left ventricular cavity size noted. All left ventricular wall segments contract normally. Left ventricular wall thickness is consistent with mild-to-moderate concentric hypertrophy. Left ventricular diastolic function was indeterminate.   Right Ventricle Normal right ventricular cavity size and systolic function noted.   Left Atrium Left atrial cavity size is mildly dilated.   Right Atrium Normal right atrial size noted.   Aortic Valve The valve appears trileaflet. The aortic valve is abnormal in structure. There is severe thickening of the aortic valve. No significant aortic valve regurgitation is present. Moderate aortic valve stenosis is present.   Mitral Valve The mitral valve is normal in structure. Trace mitral valve regurgitation is present. No significant mitral valve stenosis is present.   Tricuspid Valve The tricuspid valve is grossly normal. No evidence of tricuspid valve stenosis is present. Mild tricuspid valve regurgitation is present.   Pulmonic Valve The pulmonic valve is grossly normal in structure.   Greater Vessels No dilation of the aortic root is present.   Pericardium There is no evidence of pericardial effusion.     Consults:   Consults     Date and Time Order Name Status Description    1/22/2020 1838 Inpatient Pulmonology Consult Completed     1/19/2020 1318 Inpatient General Surgery Consult      1/19/2020 1259 Inpatient Cardiology Consult Completed     1/19/2020  0425 Inpatient General Surgery Consult Completed     1/19/2020 0425 Inpatient Gastroenterology Consult Completed     1/19/2020 0114 [facility] (on-call MD unless specified) Details Completed     1/10/2020 1545 Inpatient Urology Consult Completed     1/9/2020 0614 Inpatient Infectious Diseases Consult Completed     1/8/2020 1418 [facility] (on-call MD unless specified) Details Completed            Discharge Disposition:  Home or Self Care    Discharge Medications:     Discharge Medications      New Medications      Instructions Start Date   apixaban 5 MG tablet tablet  Commonly known as:  ELIQUIS   5 mg, Oral, Every 12 Hours Scheduled      senna-docusate sodium 8.6-50 MG tablet  Commonly known as:  SENOKOT-S   1 tablet, Oral, Daily         Continue These Medications      Instructions Start Date   acetaminophen 325 MG tablet  Commonly known as:  TYLENOL   650 mg, Oral, Every 4 Hours PRN      aspirin 81 MG EC tablet   81 mg, Oral, Daily      bisacodyl 5 MG EC tablet  Commonly known as:  DULCOLAX   10 mg, Oral, Daily PRN      brimonidine 0.2 % ophthalmic solution  Commonly known as:  ALPHAGAN   1 drop, Both Eyes, 3 Times Daily      dextrose 40 % gel  Commonly known as:  GLUTOSE   15 g, Oral, Every 15 Minutes PRN      eucerin cream   Topical, 2 Times Daily, Applied to bilateral lower extermities       latanoprost 0.005 % ophthalmic solution  Commonly known as:  XALATAN   1 drop, Both Eyes, Nightly      metFORMIN 1000 MG tablet  Commonly known as:  GLUCOPHAGE   1,000 mg, Oral, 2 Times Daily With Meals      polyethylene glycol powder  Commonly known as:  MIRALAX   17 g, Oral, 2 Times Daily      tamsulosin 0.4 MG capsule 24 hr capsule  Commonly known as:  FLOMAX   0.4 mg, Oral, Nightly         Stop These Medications    amLODIPine 5 MG tablet  Commonly known as:  NORVASC     docusate sodium 100 MG capsule     furosemide 40 MG tablet  Commonly known as:  LASIX     glipizide 10 MG tablet  Commonly known as:  GLUCOTROL     lisinopril 10 MG  tablet  Commonly known as:  PRINIVIL,ZESTRIL     potassium chloride 10 MEQ CR tablet  Commonly known as:  K-DUR            Discharge Diet:   Diet Instructions     Diet: Consistent Carbohydrate, Soft Texture; Thin Liquids, No Restrictions; Ground      Discharge Diet:   Consistent Carbohydrate  Soft Texture       Fluid Consistency:  Thin Liquids, No Restrictions    Soft Options:  Ground          Activity at Discharge:   Activity Instructions     Activity as Tolerated            Follow-up Appointments:  No future appointments.  Additional Instructions for the Follow-ups that You Need to Schedule     Discharge Follow-up with PCP   As directed       Currently Documented PCP:    Magali Burgess MD    PCP Phone Number:    [phone]     Follow Up Details:  1 week         Referral to Home Health   As directed      Face to Face Visit Date:  1/24/2020    Follow-up provider for Plan of Care?:  I treated the patient in an acute care facility and will not continue treatment after discharge.    Follow-up provider:  MAGALI BURGESS [[id number]]    Reason/Clinical Findings:  decreased mobility    Describe mobility limitations that make leaving home difficult:  decreased mobility    Nursing/Therapeutic Services Requested:  Physical Therapy    PT orders:  Strengthening    Frequency:  1 Week 1                Bradley Pearce MD  01/24/20  2:30 PM    Time Spent on Discharge Activities: Greater than 30 minutes.

## 2020-01-24 NOTE — NURSING NOTE
Placed a call to daughter Layla, she did not answer the phone, left message on phone about pt being ready for discharge.

## 2020-01-24 NOTE — PROGRESS NOTES
Continued Stay Note  [facility]     Patient Name: Raymon Solis  MRN: [id number]  Today's Date: 1/24/2020    Admit Date: 1/18/2020    Discharge Plan     Row Name 01/24/20 1619       Plan    Plan  Plan home with [facility].  NICOLE Quach RN    Patient/Family in Agreement with Plan  yes    Plan Comments  Attempted to call pt's daughter  (Layla Abrams  [phone]) without success.  Pt's meds are ready in pharmacy.  Pt unable to pay for meds.  Meds are $5.00 to be covered by CCP.   Plan home with [facility].   NICOLE Quach RN    Row Name 01/24/20 1392       Plan    Plan  Plan home with [facility].  NICOLE Quach RN    Patient/Family in Agreement with Plan  yes    Plan Comments  Attempted to call pt's daughter  (Layla Abrams [phone]) without success.   Pt with discharge order.  [facility]  ( [phone]) with [facility] called with pt's discharge.  Plan home with [facility].   NICOLE Quach RN        Discharge Codes    No documentation.       Expected Discharge Date and Time     Expected Discharge Date Expected Discharge Time    Jan 24, 2020             Kelly Quach RN

## 2020-01-24 NOTE — NURSING NOTE
Layla returned call and said she is home with the children and unable to come  her dad. Son-in-law will be coming by to transport patient home in an hour or so after work. Went over discharge medications with Layla.

## 2020-01-24 NOTE — PLAN OF CARE
Problem: Pain, Acute (Adult)  Goal: Acceptable Pain Control/Comfort Level  Outcome: Ongoing (interventions implemented as appropriate)     Problem: Skin Injury Risk (Adult)  Goal: Skin Health and Integrity  Outcome: Ongoing (interventions implemented as appropriate)     Problem: Fall Risk (Adult)  Goal: Absence of Fall  Outcome: Ongoing (interventions implemented as appropriate)     Problem: Urinary Elimination/Continence Impairment (IRF) (Adult)  Goal: Optimal Management of Bladder Program, Effective Elimination  Outcome: Ongoing (interventions implemented as appropriate)     Problem: Patient Care Overview  Goal: Plan of Care Review  Outcome: Ongoing (interventions implemented as appropriate)   PT RESTED WELL THRU THE NIGHT, FOLLOWS COMMANDS , VSS, AFLUTTER ON MONITOR,NAD. LG BM THS SHIFT, INCONT AND CONT OF BLADDER, VOIDED 500CC PER URINAL WITH ASSISTANCE., NO S/SX OF DISCOMFORT.  SKIN INTACT, TURN AND REPOSITIONED.

## 2020-01-24 NOTE — PROGRESS NOTES
Name: Raymon Solis ADMIT: 2020   : 1940  PCP: Magali Burgess MD    MRN: [id number] LOS: 4 days   AGE/SEX: 79 y.o. male  ROOM: [facility]     Subjective   Subjective   CC: abdominal pain, nausea  No acute events. Patient is sleeping but wakes up.  Tolerated a regular diet today.     Objective   Objective   Vital Signs  Temp:  [97.2 °F (36.2 °C)-98.1 °F (36.7 °C)] 97.7 °F (36.5 °C)  Heart Rate:  [57-73] 60  Resp:  [16-18] 18  BP: (124-160)/(56-72) 139/64  SpO2:  [98 %] 98 %  on   ;   Device (Oxygen Therapy): room air  Body mass index is 27.92 kg/m².  Physical Exam   Constitutional: He is oriented to person, place, and time. No distress.   HENT:   Head: Normocephalic and atraumatic.   Mouth/Throat: Oropharynx is clear and moist.   Eyes: Pupils are equal, round, and reactive to light. Conjunctivae and EOM are normal.   Neck: Normal range of motion. Neck supple.   Cardiovascular: Normal rate, regular rhythm and intact distal pulses.   Pulmonary/Chest: Effort normal. He has decreased breath sounds in the right lower field and the left lower field.   Abdominal: Soft. Bowel sounds are normal.   Musculoskeletal: He exhibits no edema or tenderness.   Neurological: He is alert and oriented to person, place, and time.   Skin: Skin is warm and dry. Capillary refill takes less than 2 seconds. He is not diaphoretic.   Psychiatric: He has a normal mood and affect. His behavior is normal.   Nursing note and vitals reviewed.    Results Review:       I reviewed the patient's new clinical results.  Results from last 7 days   Lab Units 20  0529 20  0551 20  0542 20  1158   WBC 10*3/mm3 7.52 7.34 7.83 9.49   HEMOGLOBIN g/dL 8.1* 9.0* 9.2* 8.7*   PLATELETS 10*3/mm3 318 319 313 324     Results from last 7 days   Lab Units 20  0529 20  0551 20  1023 20  0750   SODIUM mmol/L 140 137 139 146*   POTASSIUM mmol/L 3.9 3.6 4.2 4.4   CHLORIDE mmol/L 106 100 102 106   CO2 mmol/L 24.8  25.1 28.3 29.3*   BUN mg/dL 13 12 17 23   CREATININE mg/dL 0.92 0.81 0.83 0.88   GLUCOSE mg/dL 89 96 102* 84   Estimated Creatinine Clearance: 61.9 mL/min (by C-G formula based on SCr of 0.92 mg/dL).  Results from last 7 days   Lab Units 01/20/20  1023 01/18/20  2350   ALBUMIN g/dL 2.30* 2.70*   BILIRUBIN mg/dL 0.3 <0.2*   ALK PHOS U/L 126* 167*   AST (SGOT) U/L 17 25   ALT (SGPT) U/L 9 16     Results from last 7 days   Lab Units 01/23/20  0529 01/22/20  0551 01/20/20  1023 01/19/20  0750 01/18/20  2350   CALCIUM mg/dL 8.9 9.0 8.8 9.6 9.6   ALBUMIN g/dL  --   --  2.30*  --  2.70*     Results from last 7 days   Lab Units 01/22/20  0551 01/19/20  0409 01/18/20  2353 01/18/20  2350   PROCALCITONIN ng/mL 0.11  --   --  0.14   LACTATE mmol/L  --  1.7 2.9*  --      Glucose   Date/Time Value Ref Range Status   01/23/2020 1712 162 (H) 70 - 130 mg/dL Final   01/23/2020 1117 126 70 - 130 mg/dL Final   01/23/2020 0642 102 70 - 130 mg/dL Final   01/22/2020 2012 151 (H) 70 - 130 mg/dL Final   01/22/2020 1657 164 (H) 70 - 130 mg/dL Final   01/22/2020 1101 128 70 - 130 mg/dL Final   01/22/2020 0635 96 70 - 130 mg/dL Final         aspirin 81 mg Oral Daily   brimonidine 1 drop Both Eyes TID   docusate sodium 100 mg Oral BID   enoxaparin 40 mg Subcutaneous Q24H   hydrocerin  Topical BID   insulin lispro 0-7 Units Subcutaneous 4x Daily With Meals & Nightly   latanoprost 1 drop Both Eyes Nightly   piperacillin-tazobactam 3.375 g Intravenous Q8H   polyethylene glycol 17 g Oral BID   sodium chloride 10 mL Intravenous Q12H   tamsulosin 0.4 mg Oral Nightly      Diet Dysphagia; IV - Mechanical Soft No Mixed Consistencies; Thin; Consistent Carbohydrate       Assessment/Plan     Active Hospital Problems    Diagnosis  POA   • Aspiration pneumonia (CMS/HCC) [J69.0]  Unknown   • Acute abdominal pain [R10.9]  Yes   • Atrial flutter (CMS/HCC) [I48.92]  Unknown   • Hypernatremia [E87.0]  Unknown   • Pleural effusion [J90]  Unknown   • Hypoglycemia  [E16.2]  Unknown   • Gastric distention [K31.89]  Unknown   • Constipation [K59.00]  Yes   • Sepsis without acute organ dysfunction (CMS/HCC) [A41.9]  Unknown   • Elevated troponin [R79.89]  Yes   • Aortic stenosis [I35.0]  Yes   • HTN (hypertension) [I10]  Yes   • Type 2 diabetes mellitus, without long-term current use of insulin (CMS/HCC) [E11.9]  Yes      Resolved Hospital Problems   No resolved problems to display.    Acute abdominal pain/gastris distention/Constipation  - NG tube removed. Appreciate GI and surgery input-they have both signed off  - Esophagram normal  - continue bowel regimen     Aspiration pneumonia vs pneumonitis  - likely due to ileus which is now resolved  - continue IV zosyn  - pulmonology following, appreciate recs     Atrial flutter/ AS/ elevated troponin  - Rate controlled. Troponin trended down and no further workup per cardiology  - moderate AS is stable  - full AC at discharge     Hypernatremia  - resolved, on d5 1/2 ns  - SLIV     Pleural effusion  - bilateral noted on CT  - No hypoxia  - Will monitor     Type 2 diabetes mellitus, without long-term current use of insulin/ hypoglycemia  - hypoglycemia on admit likely related to decreased oral intake and on sulfonylurea.  - Hold oral anti-hyperglycemics  - cover with ssi     HTN (hypertension)  - BP currently stable.    - continue current regimen      Lovenox 40 mg SC daily for DVT prophylaxis.  Full code.  Discussed with patient, nursing staff and care team on multidisciplinary rounds.  Anticipate discharge to SNU facility tomorrow.      Bradley Pearce MD  [facility]  01/23/20  7:35 PM

## 2020-01-24 NOTE — NURSING NOTE
CALLED PATIENTS DAUGHTER- MARTIN AND EXPLAINED TO HER THAT PATIENT HAS DISCHARGE ORDERS. MARTIN WANTED TO KNOW DISCHARGE PLAN.  NURSE CALLED BARBARA WITH [facility] AND DISCUSSED FOLLOW UP CARE WITH HER. GAVE BARBARA SALAZAR'S PHONE NUMBER AND SHE WILL CALL HER AND EXPLAIN THE DISCHARGE ORERS AND OBTAINING PT ORDERS WITH GEN CARE WITH HER.

## 2020-01-24 NOTE — PROGRESS NOTES
[facility] INPATIENT PROGRESS NOTE         [address]    2020      PATIENT IDENTIFICATION:  Name: Raymon Solis ADMIT: 2020   : 1940  PCP: Magali Burgess MD    MRN: [id number] LOS: 5 days   AGE/SEX: 79 y.o. male  ROOM: [facility]                     LOS 5    Reason for visit: PNA      SUBJECTIVE:      Sleeping but will arouse to voice.    Objective   OBJECTIVE:    Vital Sign Min/Max for last 24 hours  Temp  Min: 97.6 °F (36.4 °C)  Max: 98.2 °F (36.8 °C)   BP  Min: 137/78  Max: 139/64   Pulse  Min: 57  Max: 60   Resp  Min: 16  Max: 18   SpO2  Min: 98 %  Max: 98 %   No data recorded   No data recorded                         Body mass index is 27.92 kg/m².    Intake/Output Summary (Last 24 hours) at 2020 1038  Last data filed at 2020 1021  Gross per 24 hour   Intake 1625 ml   Output 800 ml   Net 825 ml         Exam:  GEN:  No distress, appears stated age  EYES:   PERRL, anicteric sclera  ENT:    External ears/nose normal, OP clear  NECK:  No adenopathy, midline trachea  LUNGS: Normal chest on inspection, palpation and diminished BS on auscultation  CV:  Normal S1S2, without murmur  ABD:  Non tender, non distended, no hepatosplenomegaly, +BS  EXT:  No edema, cyanosis or clubbing    Scheduled meds:      aspirin 81 mg Oral Daily   brimonidine 1 drop Both Eyes TID   docusate sodium 100 mg Oral BID   enoxaparin 40 mg Subcutaneous Q24H   hydrocerin  Topical BID   insulin lispro 0-7 Units Subcutaneous 4x Daily With Meals & Nightly   latanoprost 1 drop Both Eyes Nightly   piperacillin-tazobactam 3.375 g Intravenous Q8H   polyethylene glycol 17 g Oral BID   sodium chloride 10 mL Intravenous Q12H   tamsulosin 0.4 mg Oral Nightly     IV meds:                         Data Review:  Results from last 7 days   Lab Units 20  0531 20  0529 20  0551 20  1023 20  0750   SODIUM mmol/L 143 140 137 139 146*   POTASSIUM mmol/L 4.0 3.9 3.6 4.2 4.4   CHLORIDE mmol/L  108* 106 100 102 106   CO2 mmol/L 25.6 24.8 25.1 28.3 29.3*   BUN mg/dL 14 13 12 17 23   CREATININE mg/dL 1.01 0.92 0.81 0.83 0.88   GLUCOSE mg/dL 78 89 96 102* 84   CALCIUM mg/dL 8.9 8.9 9.0 8.8 9.6         Estimated Creatinine Clearance: 56.4 mL/min (by C-G formula based on SCr of 1.01 mg/dL).  Results from last 7 days   Lab Units 01/24/20  0531 01/23/20  0529 01/22/20  0551 01/21/20  0542 01/20/20  1158   WBC 10*3/mm3 7.15 7.52 7.34 7.83 9.49   HEMOGLOBIN g/dL 8.7* 8.1* 9.0* 9.2* 8.7*   PLATELETS 10*3/mm3 266 318 319 313 324         Results from last 7 days   Lab Units 01/20/20  1023 01/18/20  2350   ALT (SGPT) U/L 9 16   AST (SGOT) U/L 17 25         Results from last 7 days   Lab Units 01/22/20  0551 01/19/20  0409 01/18/20  2353 01/18/20  2350   PROCALCITONIN ng/mL 0.11  --   --  0.14   LACTATE mmol/L  --  1.7 2.9*  --              )Assessment   ASSESSMENT:      Active Hospital Problems    Diagnosis  POA   • Aspiration pneumonia (CMS/HCC) [J69.0]  Unknown   • Acute abdominal pain [R10.9]  Yes   • Atrial flutter (CMS/HCC) [I48.92]  Unknown   • Hypernatremia [E87.0]  Unknown   • Pleural effusion [J90]  Unknown   • Hypoglycemia [E16.2]  Unknown   • Gastric distention [K31.89]  Unknown   • Constipation [K59.00]  Yes   • Sepsis without acute organ dysfunction (CMS/HCC) [A41.9]  Unknown   • Elevated troponin [R79.89]  Yes   • Aortic stenosis [I35.0]  Yes   • HTN (hypertension) [I10]  Yes   • Type 2 diabetes mellitus, without long-term current use of insulin (CMS/HCC) [E11.9]  Yes      Resolved Hospital Problems   No resolved problems to display.     Aspiration pneumonia/pneumonitis  Abdominal pain/gastritis  Sepsis  Atrial flutter  Aortic stenosis  Elevated troponin  Type 2 diabetes      PLAN:  This may be more pneumonitis from aspiration than pneumonia.  Afebrile and does not have significantly elevated white count.  Procalcitonin is also not significantly elevated.  Can continue Zosyn for potential bacterial  component.  Possibly short course.  Encourage pulmonary toilet.  Added Acapella valve to help with clearance.        Tyson Perdue MD  Pulmonary and Critical Care Medicine  [facility], [facility]  1/24/2020    10:38 AM

## 2020-01-24 NOTE — PLAN OF CARE
PATIENT ALERT AND ORIENTED WITH SOME CONFUSION AT TIMES. PATIENT WITH DC ORDERS. CCP NURSE, SANJANA ATTEMPTING TO CONTACT DAUGHTER FOR TRANSPORTATION HOME. PATIENT DENIES PAIN. NO ACUTE DISTRESS NOTED, WILL CONTINUE TO MONITOR.

## 2020-01-25 ENCOUNTER — READMISSION MANAGEMENT (OUTPATIENT)
Dept: CALL CENTER | Facility: HOSPITAL | Age: 80
End: 2020-01-25

## 2020-01-25 NOTE — OUTREACH NOTE
Prep Survey      Responses   Facility patient discharged from?  [facility]   Is patient eligible?  Yes   Discharge diagnosis  Acute abdominal pain, aflutter, pleural effusion, constipation, elevated troponin, aortic stenosis, HTN, NIDDM II   Does the patient have one of the following disease processes/diagnoses(primary or secondary)?  COPD/Pneumonia   Does the patient have Home health ordered?  Yes   What is the Home health agency?   [facility]    Is there a DME ordered?  No   Comments regarding appointments  Pt to schedule F/U appointment with PCP   Prep survey completed?  Yes          Ricarda Davies RN

## 2020-01-27 ENCOUNTER — READMISSION MANAGEMENT (OUTPATIENT)
Dept: CALL CENTER | Facility: HOSPITAL | Age: 80
End: 2020-01-27

## 2020-01-27 NOTE — OUTREACH NOTE
COPD/PN Week 1 Survey      Responses   Facility patient discharged from?  [facility]   Does the patient have one of the following disease processes/diagnoses(primary or secondary)?  COPD/Pneumonia   Is there a successful TCM telephone encounter documented?  No   Week 1 attempt successful?  No   Unsuccessful attempts  Attempt 1          Baron Hunt RN

## 2020-01-27 NOTE — PROGRESS NOTES
Case Management Discharge Note      Final Note: Pt discharged home with [facility]  on 1/24.   NICOLE Quach RN    Provided post acute provider list?: Yes  Post Acute Provider List: Inpatient Rehab, Long Term Acute Care  Post Acute Provider Quality & Resource List: Yes  Delivered To: Support Person  Method of Delivery: In person    Destination      No service has been selected for the patient.      Durable Medical Equipment      No service has been selected for the patient.      Dialysis/Infusion      No service has been selected for the patient.      Home Medical Care      Service Provider Request Status Selected Services Address Phone Number Fax Number    [facility] Selected Home Health Services [address] [place] [phone]      Therapy      No service has been selected for the patient.      Community Resources      No service has been selected for the patient.        Transportation Services  Private: Car    Final Discharge Disposition Code: 06 - home with home health care

## 2020-01-28 ENCOUNTER — READMISSION MANAGEMENT (OUTPATIENT)
Dept: CALL CENTER | Facility: HOSPITAL | Age: 80
End: 2020-01-28

## 2020-01-28 NOTE — OUTREACH NOTE
COPD/PN Week 1 Survey      Responses   Facility patient discharged from?  [facility]   Does the patient have one of the following disease processes/diagnoses(primary or secondary)?  COPD/Pneumonia   Is there a successful TCM telephone encounter documented?  No   Was the primary reason for admission:  Pneumonia   Week 1 attempt successful?  No   Unsuccessful attempts  Attempt 2          Cynthia Mcgill RN

## 2020-01-31 ENCOUNTER — READMISSION MANAGEMENT (OUTPATIENT)
Dept: CALL CENTER | Facility: HOSPITAL | Age: 80
End: 2020-01-31

## 2020-01-31 NOTE — OUTREACH NOTE
COPD/PN Week 2 Survey      Responses   Facility patient discharged from?  [facility]   Does the patient have one of the following disease processes/diagnoses(primary or secondary)?  COPD/Pneumonia   Was the primary reason for admission:  Pneumonia   Week 2 attempt successful?  No   Unsuccessful attempts  Attempt 1          Layla Lainez RN

## 2020-02-04 ENCOUNTER — READMISSION MANAGEMENT (OUTPATIENT)
Dept: CALL CENTER | Facility: HOSPITAL | Age: 80
End: 2020-02-04

## 2020-02-04 NOTE — OUTREACH NOTE
COPD/PN Week 2 Survey      Responses   Facility patient discharged from?  [facility]   Does the patient have one of the following disease processes/diagnoses(primary or secondary)?  COPD/Pneumonia   Was the primary reason for admission:  Pneumonia   Week 2 attempt successful?  No   Unsuccessful attempts  Attempt 2          Jarred Covarrubias RN

## 2020-02-07 ENCOUNTER — READMISSION MANAGEMENT (OUTPATIENT)
Dept: CALL CENTER | Facility: HOSPITAL | Age: 80
End: 2020-02-07

## 2020-02-07 NOTE — OUTREACH NOTE
COPD/PN Week 3 Survey      Responses   Facility patient discharged from?  [facility]   Does the patient have one of the following disease processes/diagnoses(primary or secondary)?  COPD/Pneumonia   Was the primary reason for admission:  Pneumonia   Week 3 attempt successful?  Yes   Call start time  1748   Call end time  1753   Discharge diagnosis  Acute abdominal pain, aflutter, pleural effusion, constipation, elevated troponin, aortic stenosis, HTN, NIDDM II   Person spoke with today (if not patient) and relationship  Layla-daughter   Meds reviewed with patient/caregiver?  Yes   Is the patient having any side effects they believe may be caused by any medication additions or changes?  No   Does the patient have all medications ordered at discharge?  Yes   Is the patient taking all medications as directed (includes completed medication regime)?  Yes   Does the patient have a primary care provider?   Yes   Does the patient have an appointment with their PCP or pulmonologist within 7 days of discharge?  Greater than 7 days   What is preventing the patient from scheduling follow up appointments within 7 days of discharge?  -- [Pt unable to leave house]   Nursing Interventions  -- [Gave Layla number to [facility] r/t patient not being able to get in to see PCP ]   Has the patient kept scheduled appointments due by today?  N/A   What is the Home health agency?   Layla reports the patients PCP would not approve home health. He never received home health.    Psychosocial issues?  Yes   Nursing interventions  Other [Gave Layla the number to [facility]]   Psychosocial comments  Not able to get out of house so he's not seen a provider since D/C. He's not seen PCP in a while so PCP office would not sign off on home health.   Did the patient receive a copy of their discharge instructions?  Yes   Nursing interventions  Reviewed instructions with patient   What is the patient's perception of their  health status since discharge?  Same   Nursing Interventions  Nurse provided patient education   Are the patient's immunizations up to date?   Yes   If the patient is a current smoker, are they able to teach back resources for cessation?  -- [Pt is a nonsmoker]   Is the patient/caregiver able to teach back the hierarchy of who to call/visit for symptoms/problems? PCP, Specialist, Home health nurse, Urgent Care, ED, 911  Yes   Is the patient/caregiver able to teach back signs and symptoms of worsening condition:  Fever/chills, Shortness of breath, Chest pain   Is the patient/caregiver able to teach back importance of completing antibiotic course of treatment?  Yes   Week 3 call completed?  Yes          Fiorella Baptiste RN

## 2020-02-14 ENCOUNTER — READMISSION MANAGEMENT (OUTPATIENT)
Dept: CALL CENTER | Facility: HOSPITAL | Age: 80
End: 2020-02-14

## 2020-02-14 NOTE — OUTREACH NOTE
COPD/PN Week 4 Survey      Responses   Facility patient discharged from?  [facility]   Does the patient have one of the following disease processes/diagnoses(primary or secondary)?  COPD/Pneumonia   Was the primary reason for admission:  Pneumonia   Week 4 attempt successful?  No          Analia Shoemaker RN

## 2020-04-19 ENCOUNTER — APPOINTMENT (OUTPATIENT)
Dept: CT IMAGING | Facility: HOSPITAL | Age: 80
End: 2020-04-19

## 2020-04-19 ENCOUNTER — HOSPITAL ENCOUNTER (EMERGENCY)
Facility: HOSPITAL | Age: 80
Discharge: HOME | End: 2020-04-19
Attending: EMERGENCY MEDICINE | Admitting: EMERGENCY MEDICINE

## 2020-04-19 VITALS
HEART RATE: 70 BPM | OXYGEN SATURATION: 99 % | TEMPERATURE: 98.8 F | WEIGHT: 140.6 LBS | HEIGHT: 65 IN | RESPIRATION RATE: 18 BRPM | SYSTOLIC BLOOD PRESSURE: 194 MMHG | DIASTOLIC BLOOD PRESSURE: 85 MMHG | BODY MASS INDEX: 23.43 KG/M2

## 2020-04-19 DIAGNOSIS — Z79.01 ANTICOAGULATED BY ANTICOAGULATION TREATMENT: ICD-10-CM
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
## 2020-04-19 PROCEDURE — 70450 CT HEAD/BRAIN W/O DYE: CPT

## 2020-04-19 PROCEDURE — 99283 EMERGENCY DEPT VISIT LOW MDM: CPT

## 2020-04-19 NOTE — ED NOTES
"Pt reports falling OOB today, baseline dementia, pt is currently a/o x 2. Reports normally has \"some leg pains\". Denies any complaints at this time. Denies hitting head or LOC.      Denise Gomez RN  04/19/20 7213    "

## 2020-04-19 NOTE — ED NOTES
Pt presents to ED via EMS from home. Pt had a fall today from his bed. Pt was unable to get up but denies any injury and 911 was called for assistance getting off the floor. EMS reports pt was hypertensive on site 210/100. Pt denies LOC or blood thinner use.Pt is A&Ox3, which is his baseline per family.     [facility] Incident Number: [id number]      Sneha Keen RN  04/19/20 7267

## 2020-04-19 NOTE — ED PROVIDER NOTES
EMERGENCY DEPARTMENT ENCOUNTER    Room Number:  10/10  Date of encounter:  4/19/2020  PCP: Magali Burgess MD  Historian: Patient     I used full protective equipment while examining this patient.  This includes face mask, gloves and protective eyewear.  I washed my hands before entering the room and immediately upon leaving the room      HPI:  Chief Complaint: Fall from bed  A complete HPI/ROS/PMH/PSH/SH/FH are unobtainable due to: None    Context: Raymon Solis is a 79 y.o. male who presents to the ED c/o fall from bed.  Patient was getting up to the bathroom and fell out of bed and could not get up.  Patient lives at home with his wife and was found by daughter.  He called EMS to get him up and brought him here.  Here in the ED patient has no complaints.  He is anticoagulated on Eliquis.  He denies headache neck pain or back pain.  Denies recent illness.      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   • Elevated troponin 06/13/2019   • History of noncompliance with medical treatment 01/08/2020   • Bacteremia due to Escherichia coli 01/09/2020   • Left lower lobe pulmonary infiltrate 01/09/2020   • Non-traumatic rhabdomyolysis 01/09/2020   • Lesion of left native kidney 01/09/2020   • Constipation 01/15/2020   • Acute abdominal pain 01/19/2020   • Atrial flutter (CMS/[facility]) 01/19/2020   • Pleural effusion 01/19/2020     Resolved Ambulatory Problems     Diagnosis Date Noted   • Hypokalemia 05/13/2019   • Sepsis without acute organ dysfunction (CMS/[facility]) 01/08/2020   • E. coli UTI (urinary tract infection) 01/09/2020   • Acute urinary retention 01/09/2020   • Metabolic  encephalopathy 01/09/2020   • Hypernatremia 01/19/2020   • Hypoglycemia 01/19/2020   • Gastric distention 01/19/2020   • Aspiration pneumonia (CMS/HCC) 01/20/2020     Past Medical History:   Diagnosis Date   • Dementia (CMS/HCC)    • Diabetes mellitus (CMS/[facility])    • Hypertension    • UTI (urinary tract infection)          PAST SURGICAL HISTORY  Past Surgical History:   Procedure Laterality Date   • CATARACT EXTRACTION, BILATERAL           FAMILY HISTORY  History reviewed. No pertinent family history.      SOCIAL HISTORY  Social History     Socioeconomic History   • Marital status:      Spouse name: Not on file   • Number of children: Not on file   • Years of education: Not on file   • Highest education level: Not on file   Tobacco Use   • Smoking status: Never Smoker   • Smokeless tobacco: Never Used   Substance and Sexual Activity   • Alcohol use: No     Frequency: Never   • Drug use: No   • Sexual activity: Defer         ALLERGIES  Patient has no known allergies.       REVIEW OF SYSTEMS  Review of Systems   Constitutional: Negative for fever.   Respiratory: Negative for shortness of breath.    Cardiovascular: Negative for chest pain.           PHYSICAL EXAM    I have reviewed the triage vital signs and nursing notes.    ED Triage Vitals   Temp Heart Rate Resp BP SpO2   04/19/20 1359 04/19/20 1345 04/19/20 1345 04/19/20 1345 04/19/20 1345   98.8 °F (37.1 °C) 70 18 180/100 99 %      Temp src Heart Rate Source Patient Position BP Location FiO2 (%)   04/19/20 1359 04/19/20 1345 04/19/20 1345 04/19/20 1345 --   Oral Monitor Sitting Right arm        Physical Exam  GENERAL: Alert male in no obvious distress  HENT: nares patent  EYES: no scleral icterus  CV: regular rhythm, regular rate  RESPIRATORY: normal effort clear to auscultation bilaterally  ABDOMEN: soft  MUSCULOSKELETAL: no deformity  NEURO: Strength, sensation, and coordination are grossly intact.  Speech and mentation are unremarkable  SKIN: warm,  dry      LAB RESULTS  No results found for this or any previous visit (from the past 24 hour(s)).    Ordered the above labs and independently reviewed the results.      RADIOLOGY  No Radiology Exams Resulted Within Past 24 Hours    I ordered the above noted radiological studies. Reviewed by me and discussed with radiologist.  See dictation for official radiology interpretation.      PROCEDURES  Procedures      MEDICATIONS GIVEN IN ER    Medications - No data to display      PROGRESS, DATA ANALYSIS, CONSULTS, AND MEDICAL DECISION MAKING    All labs have been independently reviewed by me.  All radiology studies have been reviewed by me and discussed with radiologist dictating the report.   EKG's independently viewed and interpreted by me.  Discussion below represents my analysis of pertinent findings related to patient's condition, differential diagnosis, treatment plan and final disposition.      ED Course as of Apr 19 1543   Sun Apr 19, 2020   1403 MDM-this is a 79-year-old anticoagulated on Eliquis.  Given his age and anticoagulation he does need a head CT to rule out intracranial hemorrhage.  I see no other signs on examination to suggest musculoskeletal injury and I do not think we need to do x-rays at this point.  I have spoken with patient's daughter over the phone and plan would be to likely discharge home if CT scan is negative.    [DB]   1539 Discussed CT of head with Dr. Ran Nair.  There is no obvious fracture or hemorrhage.  There is some sinus opacification.    [DB]      ED Course User Index  [DB] Baron Rojas MD       AS OF 15:43 VITALS:    BP - (!) 183/92  HR - 67  TEMP - 98.8 °F (37.1 °C) (Oral)  O2 SATS - 100%      DIAGNOSIS  Final diagnoses:   Fall, initial encounter   Anticoagulated by anticoagulation treatment         DISPOSITION  DISCHARGE    Patient discharged in stable condition.    Reviewed implications of results, diagnosis, meds, responsibility to follow up, warning signs and symptoms  of possible worsening, potential complications and reasons to return to ER, including increased pain or as needed.    Patient/Family voiced understanding of above instructions.    Discussed plan for discharge, as there is no emergent indication for admission. Patient referred to primary care provider for BP management due to today's BP. Pt/family is agreeable and understands need for follow up and repeat testing.  Pt is aware that discharge does not mean that nothing is wrong but it indicates no emergency is present that requires admission and they must continue care with follow-up as given below or physician of their choice.     FOLLOW-UP  Magali Burgess MD  [facility] [id number] [phone]      As needed         Medication List      No changes were made to your prescriptions during this visit.                Baron Rojas MD  04/19/20 1108